# Patient Record
Sex: MALE | Race: WHITE | NOT HISPANIC OR LATINO | Employment: OTHER | URBAN - METROPOLITAN AREA
[De-identification: names, ages, dates, MRNs, and addresses within clinical notes are randomized per-mention and may not be internally consistent; named-entity substitution may affect disease eponyms.]

---

## 2019-02-22 ENCOUNTER — APPOINTMENT (OUTPATIENT)
Dept: GENERAL RADIOLOGY | Facility: HOSPITAL | Age: 84
End: 2019-02-22

## 2019-02-22 ENCOUNTER — HOSPITAL ENCOUNTER (INPATIENT)
Facility: HOSPITAL | Age: 84
LOS: 10 days | Discharge: NURSING, REHAB OR LONG-TERM CARE | End: 2019-03-04
Attending: EMERGENCY MEDICINE | Admitting: INTERNAL MEDICINE

## 2019-02-22 DIAGNOSIS — I50.23 ACUTE ON CHRONIC SYSTOLIC CONGESTIVE HEART FAILURE (HCC): ICD-10-CM

## 2019-02-22 DIAGNOSIS — Z74.09 IMPAIRED FUNCTIONAL MOBILITY, BALANCE, GAIT, AND ENDURANCE: ICD-10-CM

## 2019-02-22 DIAGNOSIS — N18.9 ACUTE RENAL FAILURE SUPERIMPOSED ON CHRONIC KIDNEY DISEASE, UNSPECIFIED CKD STAGE, UNSPECIFIED ACUTE RENAL FAILURE TYPE (HCC): ICD-10-CM

## 2019-02-22 DIAGNOSIS — Z78.9 IMPAIRED MOBILITY AND ADLS: ICD-10-CM

## 2019-02-22 DIAGNOSIS — S82.841A BIMALLEOLAR ANKLE FRACTURE, RIGHT, CLOSED, INITIAL ENCOUNTER: ICD-10-CM

## 2019-02-22 DIAGNOSIS — S93.431A SYNDESMOTIC DISRUPTION OF RIGHT ANKLE, INITIAL ENCOUNTER: ICD-10-CM
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
## 2019-02-22 DIAGNOSIS — I95.89 HYPOTENSION DUE TO HYPOVOLEMIA: ICD-10-CM

## 2019-02-22 DIAGNOSIS — N17.9 ACUTE RENAL FAILURE SUPERIMPOSED ON CHRONIC KIDNEY DISEASE, UNSPECIFIED CKD STAGE, UNSPECIFIED ACUTE RENAL FAILURE TYPE (HCC): ICD-10-CM

## 2019-02-22 DIAGNOSIS — S82.891A CLOSED FRACTURE OF RIGHT ANKLE, INITIAL ENCOUNTER: ICD-10-CM
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
## 2019-02-22 DIAGNOSIS — E86.9 VOLUME DEPLETION: ICD-10-CM

## 2019-02-22 DIAGNOSIS — I25.119 CORONARY ARTERY DISEASE INVOLVING NATIVE CORONARY ARTERY OF NATIVE HEART WITH ANGINA PECTORIS (HCC): ICD-10-CM

## 2019-02-22 DIAGNOSIS — Z74.09 IMPAIRED MOBILITY AND ADLS: ICD-10-CM

## 2019-02-22 DIAGNOSIS — K52.9 GASTROENTERITIS: ICD-10-CM

## 2019-02-22 DIAGNOSIS — E86.1 HYPOTENSION DUE TO HYPOVOLEMIA: ICD-10-CM

## 2019-02-22 PROBLEM — I50.22 CHRONIC SYSTOLIC CHF (CONGESTIVE HEART FAILURE) (HCC): Status: ACTIVE | Noted: 2019-02-22

## 2019-02-22 PROBLEM — E11.42 TYPE 2 DIABETES MELLITUS WITH DIABETIC POLYNEUROPATHY, WITH LONG-TERM CURRENT USE OF INSULIN (HCC): Status: ACTIVE | Noted: 2019-02-22

## 2019-02-22 PROBLEM — I25.5 ISCHEMIC CARDIOMYOPATHY: Status: ACTIVE | Noted: 2019-02-22

## 2019-02-22 PROBLEM — R11.14 BILIOUS VOMITING WITH NAUSEA: Status: ACTIVE | Noted: 2019-02-22

## 2019-02-22 PROBLEM — E66.01 SEVERE OBESITY (BMI 35.0-35.9 WITH COMORBIDITY) (HCC): Status: ACTIVE | Noted: 2019-02-22

## 2019-02-22 PROBLEM — I10 ESSENTIAL HYPERTENSION: Status: ACTIVE | Noted: 2019-02-22

## 2019-02-22 PROBLEM — S82.831A CLOSED FRACTURE OF DISTAL END OF RIGHT FIBULA: Status: ACTIVE | Noted: 2019-02-22

## 2019-02-22 PROBLEM — N18.30 CKD (CHRONIC KIDNEY DISEASE) STAGE 3, GFR 30-59 ML/MIN (HCC): Status: ACTIVE | Noted: 2019-02-22

## 2019-02-22 PROBLEM — R19.7 DIARRHEA OF PRESUMED INFECTIOUS ORIGIN: Status: ACTIVE | Noted: 2019-02-22

## 2019-02-22 PROBLEM — J44.9 COPD (CHRONIC OBSTRUCTIVE PULMONARY DISEASE) (HCC): Status: ACTIVE | Noted: 2019-02-22

## 2019-02-22 PROBLEM — E86.0 DEHYDRATION: Status: ACTIVE | Noted: 2019-02-22

## 2019-02-22 PROBLEM — Z79.4 TYPE 2 DIABETES MELLITUS WITH DIABETIC POLYNEUROPATHY, WITH LONG-TERM CURRENT USE OF INSULIN (HCC): Status: ACTIVE | Noted: 2019-02-22

## 2019-02-22 LAB
ALBUMIN SERPL-MCNC: 4.06 G/DL (ref 3.2–4.8)
ALBUMIN/GLOB SERPL: 1.8 G/DL (ref 1.5–2.5)
ALP SERPL-CCNC: 38 U/L (ref 25–100)
ALT SERPL W P-5'-P-CCNC: 13 U/L (ref 7–40)
ANION GAP SERPL CALCULATED.3IONS-SCNC: 7 MMOL/L (ref 3–11)
AST SERPL-CCNC: 15 U/L (ref 0–33)
BASOPHILS # BLD AUTO: 0.01 10*3/MM3 (ref 0–0.2)
BASOPHILS NFR BLD AUTO: 0.2 % (ref 0–1)
BILIRUB SERPL-MCNC: 1.2 MG/DL (ref 0.3–1.2)
BUN BLD-MCNC: 57 MG/DL (ref 9–23)
BUN/CREAT SERPL: 20.5 (ref 7–25)
CALCIUM SPEC-SCNC: 8.4 MG/DL (ref 8.7–10.4)
CHLORIDE SERPL-SCNC: 99 MMOL/L (ref 99–109)
CO2 SERPL-SCNC: 25 MMOL/L (ref 20–31)
CREAT BLD-MCNC: 2.78 MG/DL (ref 0.6–1.3)
D-LACTATE SERPL-SCNC: 1.5 MMOL/L (ref 0.5–2)
DEPRECATED RDW RBC AUTO: 55.7 FL (ref 37–54)
EOSINOPHIL # BLD AUTO: 0.02 10*3/MM3 (ref 0–0.3)
EOSINOPHIL NFR BLD AUTO: 0.3 % (ref 0–3)
ERYTHROCYTE [DISTWIDTH] IN BLOOD BY AUTOMATED COUNT: 15.5 % (ref 11.3–14.5)
GFR SERPL CREATININE-BSD FRML MDRD: 22 ML/MIN/1.73
GLOBULIN UR ELPH-MCNC: 2.2 GM/DL
GLUCOSE BLD-MCNC: 97 MG/DL (ref 70–100)
GLUCOSE BLDC GLUCOMTR-MCNC: 120 MG/DL (ref 70–130)
HCT VFR BLD AUTO: 35.3 % (ref 38.9–50.9)
HGB BLD-MCNC: 11.3 G/DL (ref 13.1–17.5)
HOLD SPECIMEN: NORMAL
HOLD SPECIMEN: NORMAL
IMM GRANULOCYTES # BLD AUTO: 0.04 10*3/MM3 (ref 0–0.05)
IMM GRANULOCYTES NFR BLD AUTO: 0.7 % (ref 0–0.6)
LYMPHOCYTES # BLD AUTO: 0.61 10*3/MM3 (ref 0.6–4.8)
LYMPHOCYTES NFR BLD AUTO: 10.1 % (ref 24–44)
MAGNESIUM SERPL-MCNC: 2 MG/DL (ref 1.3–2.7)
MCH RBC QN AUTO: 31.5 PG (ref 27–31)
MCHC RBC AUTO-ENTMCNC: 32 G/DL (ref 32–36)
MCV RBC AUTO: 98.3 FL (ref 80–99)
MONOCYTES # BLD AUTO: 0.48 10*3/MM3 (ref 0–1)
MONOCYTES NFR BLD AUTO: 7.9 % (ref 0–12)
NEUTROPHILS # BLD AUTO: 4.93 10*3/MM3 (ref 1.5–8.3)
NEUTROPHILS NFR BLD AUTO: 81.5 % (ref 41–71)
PLATELET # BLD AUTO: 178 10*3/MM3 (ref 150–450)
PMV BLD AUTO: 9.1 FL (ref 6–12)
POTASSIUM BLD-SCNC: 5.2 MMOL/L (ref 3.5–5.5)
PROT SERPL-MCNC: 6.3 G/DL (ref 5.7–8.2)
RBC # BLD AUTO: 3.59 10*6/MM3 (ref 4.2–5.76)
SODIUM BLD-SCNC: 131 MMOL/L (ref 132–146)
TROPONIN I SERPL-MCNC: 0.01 NG/ML (ref 0–0.07)
WBC NRBC COR # BLD: 6.05 10*3/MM3 (ref 3.5–10.8)
WHOLE BLOOD HOLD SPECIMEN: NORMAL
WHOLE BLOOD HOLD SPECIMEN: NORMAL

## 2019-02-22 PROCEDURE — 93005 ELECTROCARDIOGRAM TRACING: CPT | Performed by: EMERGENCY MEDICINE

## 2019-02-22 PROCEDURE — 71045 X-RAY EXAM CHEST 1 VIEW: CPT

## 2019-02-22 PROCEDURE — A9270 NON-COVERED ITEM OR SERVICE: HCPCS | Performed by: INTERNAL MEDICINE

## 2019-02-22 PROCEDURE — 63710000001 ATORVASTATIN 10 MG TABLET: Performed by: INTERNAL MEDICINE

## 2019-02-22 PROCEDURE — 73610 X-RAY EXAM OF ANKLE: CPT

## 2019-02-22 PROCEDURE — 83735 ASSAY OF MAGNESIUM: CPT | Performed by: EMERGENCY MEDICINE

## 2019-02-22 PROCEDURE — 94640 AIRWAY INHALATION TREATMENT: CPT

## 2019-02-22 PROCEDURE — 63710000001 METOPROLOL SUCCINATE XL 50 MG TABLET SUSTAINED-RELEASE 24 HOUR: Performed by: INTERNAL MEDICINE

## 2019-02-22 PROCEDURE — 73560 X-RAY EXAM OF KNEE 1 OR 2: CPT

## 2019-02-22 PROCEDURE — 83605 ASSAY OF LACTIC ACID: CPT | Performed by: EMERGENCY MEDICINE

## 2019-02-22 PROCEDURE — 99223 1ST HOSP IP/OBS HIGH 75: CPT | Performed by: INTERNAL MEDICINE

## 2019-02-22 PROCEDURE — 80053 COMPREHEN METABOLIC PANEL: CPT | Performed by: EMERGENCY MEDICINE

## 2019-02-22 PROCEDURE — 93005 ELECTROCARDIOGRAM TRACING: CPT

## 2019-02-22 PROCEDURE — 84484 ASSAY OF TROPONIN QUANT: CPT

## 2019-02-22 PROCEDURE — 63710000001 INSULIN LISPRO (HUMAN) PER 5 UNITS: Performed by: INTERNAL MEDICINE

## 2019-02-22 PROCEDURE — 99285 EMERGENCY DEPT VISIT HI MDM: CPT

## 2019-02-22 PROCEDURE — 63710000001 ASPIRIN 81 MG TABLET DELAYED-RELEASE: Performed by: INTERNAL MEDICINE

## 2019-02-22 PROCEDURE — 63710000001 ALLOPURINOL 100 MG TABLET: Performed by: INTERNAL MEDICINE

## 2019-02-22 PROCEDURE — 82962 GLUCOSE BLOOD TEST: CPT

## 2019-02-22 PROCEDURE — 63710000001 VITAMIN B-12 1000 MCG TABLET: Performed by: INTERNAL MEDICINE

## 2019-02-22 PROCEDURE — 63710000001 CITALOPRAM 10 MG TABLET: Performed by: INTERNAL MEDICINE

## 2019-02-22 PROCEDURE — 94799 UNLISTED PULMONARY SVC/PX: CPT

## 2019-02-22 PROCEDURE — 99222 1ST HOSP IP/OBS MODERATE 55: CPT | Performed by: ORTHOPAEDIC SURGERY

## 2019-02-22 PROCEDURE — 85025 COMPLETE CBC W/AUTO DIFF WBC: CPT | Performed by: EMERGENCY MEDICINE

## 2019-02-22 RX ORDER — LANOLIN ALCOHOL/MO/W.PET/CERES
1000 CREAM (GRAM) TOPICAL DAILY
Status: DISCONTINUED | OUTPATIENT
Start: 2019-02-22 | End: 2019-03-04 | Stop reason: HOSPADM

## 2019-02-22 RX ORDER — ALBUTEROL SULFATE 2.5 MG/3ML
2.5 SOLUTION RESPIRATORY (INHALATION)
Status: DISCONTINUED | OUTPATIENT
Start: 2019-02-22 | End: 2019-02-25

## 2019-02-22 RX ORDER — ALLOPURINOL 100 MG/1
100 TABLET ORAL DAILY
COMMUNITY

## 2019-02-22 RX ORDER — ONDANSETRON 2 MG/ML
4 INJECTION INTRAMUSCULAR; INTRAVENOUS EVERY 6 HOURS PRN
Status: DISCONTINUED | OUTPATIENT
Start: 2019-02-22 | End: 2019-03-04 | Stop reason: HOSPADM

## 2019-02-22 RX ORDER — SIMVASTATIN 20 MG
20 TABLET ORAL NIGHTLY
COMMUNITY
End: 2019-12-03 | Stop reason: SDUPTHER

## 2019-02-22 RX ORDER — NALOXONE HCL 0.4 MG/ML
0.4 VIAL (ML) INJECTION
Status: DISCONTINUED | OUTPATIENT
Start: 2019-02-22 | End: 2019-02-26

## 2019-02-22 RX ORDER — SODIUM CHLORIDE 0.9 % (FLUSH) 0.9 %
3 SYRINGE (ML) INJECTION EVERY 12 HOURS SCHEDULED
Status: DISCONTINUED | OUTPATIENT
Start: 2019-02-22 | End: 2019-03-04 | Stop reason: HOSPADM

## 2019-02-22 RX ORDER — HYDROCODONE BITARTRATE AND ACETAMINOPHEN 5; 325 MG/1; MG/1
1 TABLET ORAL EVERY 4 HOURS PRN
Status: DISCONTINUED | OUTPATIENT
Start: 2019-02-22 | End: 2019-02-27

## 2019-02-22 RX ORDER — ALBUTEROL SULFATE 2.5 MG/3ML
2.5 SOLUTION RESPIRATORY (INHALATION) EVERY 6 HOURS PRN
Status: DISCONTINUED | OUTPATIENT
Start: 2019-02-22 | End: 2019-03-04 | Stop reason: HOSPADM

## 2019-02-22 RX ORDER — ALBUTEROL SULFATE 90 UG/1
1-2 AEROSOL, METERED RESPIRATORY (INHALATION) EVERY 4 HOURS PRN
COMMUNITY

## 2019-02-22 RX ORDER — ATORVASTATIN CALCIUM 10 MG/1
10 TABLET, FILM COATED ORAL DAILY
Status: DISCONTINUED | OUTPATIENT
Start: 2019-02-22 | End: 2019-03-04 | Stop reason: HOSPADM

## 2019-02-22 RX ORDER — FAMOTIDINE 20 MG/1
20 TABLET, FILM COATED ORAL 2 TIMES DAILY PRN
Status: DISCONTINUED | OUTPATIENT
Start: 2019-02-22 | End: 2019-02-26

## 2019-02-22 RX ORDER — DOCUSATE SODIUM 100 MG/1
100 CAPSULE, LIQUID FILLED ORAL 2 TIMES DAILY PRN
Status: DISCONTINUED | OUTPATIENT
Start: 2019-02-22 | End: 2019-03-04 | Stop reason: HOSPADM

## 2019-02-22 RX ORDER — NICOTINE POLACRILEX 4 MG
15 LOZENGE BUCCAL
Status: DISCONTINUED | OUTPATIENT
Start: 2019-02-22 | End: 2019-02-26

## 2019-02-22 RX ORDER — SODIUM CHLORIDE 0.9 % (FLUSH) 0.9 %
3-10 SYRINGE (ML) INJECTION AS NEEDED
Status: DISCONTINUED | OUTPATIENT
Start: 2019-02-22 | End: 2019-02-26

## 2019-02-22 RX ORDER — ASPIRIN 81 MG/1
81 TABLET ORAL DAILY
Status: DISCONTINUED | OUTPATIENT
Start: 2019-02-22 | End: 2019-03-04 | Stop reason: HOSPADM

## 2019-02-22 RX ORDER — ONDANSETRON 4 MG/1
4 TABLET, FILM COATED ORAL EVERY 6 HOURS PRN
Status: DISCONTINUED | OUTPATIENT
Start: 2019-02-22 | End: 2019-02-26

## 2019-02-22 RX ORDER — ONDANSETRON 2 MG/ML
4 INJECTION INTRAMUSCULAR; INTRAVENOUS ONCE
Status: DISCONTINUED | OUTPATIENT
Start: 2019-02-22 | End: 2019-02-22

## 2019-02-22 RX ORDER — CITALOPRAM 10 MG/1
10 TABLET ORAL DAILY
COMMUNITY

## 2019-02-22 RX ORDER — MORPHINE SULFATE 4 MG/ML
1 INJECTION, SOLUTION INTRAMUSCULAR; INTRAVENOUS EVERY 4 HOURS PRN
Status: DISCONTINUED | OUTPATIENT
Start: 2019-02-22 | End: 2019-03-04 | Stop reason: HOSPADM

## 2019-02-22 RX ORDER — SODIUM CHLORIDE 9 MG/ML
100 INJECTION, SOLUTION INTRAVENOUS CONTINUOUS
Status: ACTIVE | OUTPATIENT
Start: 2019-02-22 | End: 2019-02-23

## 2019-02-22 RX ORDER — LANOLIN ALCOHOL/MO/W.PET/CERES
1000 CREAM (GRAM) TOPICAL DAILY
COMMUNITY

## 2019-02-22 RX ORDER — GLIPIZIDE 2.5 MG/1
2.5 TABLET, EXTENDED RELEASE ORAL NIGHTLY
COMMUNITY
End: 2019-03-04 | Stop reason: HOSPADM

## 2019-02-22 RX ORDER — DEXTROSE MONOHYDRATE 25 G/50ML
25 INJECTION, SOLUTION INTRAVENOUS
Status: DISCONTINUED | OUTPATIENT
Start: 2019-02-22 | End: 2019-02-26

## 2019-02-22 RX ORDER — FUROSEMIDE 40 MG/1
40 TABLET ORAL 2 TIMES DAILY
COMMUNITY
End: 2019-03-04 | Stop reason: HOSPADM

## 2019-02-22 RX ORDER — METOPROLOL SUCCINATE 50 MG/1
50 TABLET, EXTENDED RELEASE ORAL DAILY
Status: DISCONTINUED | OUTPATIENT
Start: 2019-02-22 | End: 2019-02-27

## 2019-02-22 RX ORDER — ALBUTEROL SULFATE 2.5 MG/3ML
2.5 SOLUTION RESPIRATORY (INHALATION) EVERY 8 HOURS PRN
COMMUNITY

## 2019-02-22 RX ORDER — SODIUM CHLORIDE 0.9 % (FLUSH) 0.9 %
10 SYRINGE (ML) INJECTION AS NEEDED
Status: DISCONTINUED | OUTPATIENT
Start: 2019-02-22 | End: 2019-03-04 | Stop reason: HOSPADM

## 2019-02-22 RX ORDER — CITALOPRAM 20 MG/1
10 TABLET ORAL DAILY
Status: DISCONTINUED | OUTPATIENT
Start: 2019-02-22 | End: 2019-03-04 | Stop reason: HOSPADM

## 2019-02-22 RX ORDER — ACETAMINOPHEN 325 MG/1
650 TABLET ORAL EVERY 4 HOURS PRN
Status: DISCONTINUED | OUTPATIENT
Start: 2019-02-22 | End: 2019-03-04 | Stop reason: HOSPADM

## 2019-02-22 RX ORDER — HYDROCODONE BITARTRATE AND ACETAMINOPHEN 5; 325 MG/1; MG/1
1 TABLET ORAL ONCE
Status: COMPLETED | OUTPATIENT
Start: 2019-02-22 | End: 2019-02-22

## 2019-02-22 RX ORDER — ALLOPURINOL 100 MG/1
100 TABLET ORAL DAILY
Status: DISCONTINUED | OUTPATIENT
Start: 2019-02-22 | End: 2019-03-04 | Stop reason: HOSPADM

## 2019-02-22 RX ORDER — HYDROCODONE BITARTRATE AND ACETAMINOPHEN 5; 325 MG/1; MG/1
1 TABLET ORAL ONCE
Status: DISCONTINUED | OUTPATIENT
Start: 2019-02-22 | End: 2019-02-22 | Stop reason: SDUPTHER

## 2019-02-22 RX ADMIN — METOPROLOL SUCCINATE 50 MG: 50 TABLET, EXTENDED RELEASE ORAL at 21:30

## 2019-02-22 RX ADMIN — SODIUM CHLORIDE 1000 ML: 9 INJECTION, SOLUTION INTRAVENOUS at 17:13

## 2019-02-22 RX ADMIN — ASPIRIN 81 MG: 81 TABLET, COATED ORAL at 21:30

## 2019-02-22 RX ADMIN — HYDROCODONE BITARTRATE AND ACETAMINOPHEN 1 TABLET: 5; 325 TABLET ORAL at 17:09

## 2019-02-22 RX ADMIN — CITALOPRAM HYDROBROMIDE 10 MG: 20 TABLET ORAL at 21:30

## 2019-02-22 RX ADMIN — ALLOPURINOL 100 MG: 100 TABLET ORAL at 21:30

## 2019-02-22 RX ADMIN — ATORVASTATIN CALCIUM 10 MG: 10 TABLET, FILM COATED ORAL at 21:30

## 2019-02-22 RX ADMIN — ALBUTEROL SULFATE 2.5 MG: 2.5 SOLUTION RESPIRATORY (INHALATION) at 17:46

## 2019-02-22 RX ADMIN — SODIUM CHLORIDE 1000 ML: 9 INJECTION, SOLUTION INTRAVENOUS at 13:42

## 2019-02-22 RX ADMIN — SODIUM CHLORIDE 100 ML/HR: 9 INJECTION, SOLUTION INTRAVENOUS at 21:30

## 2019-02-22 RX ADMIN — CYANOCOBALAMIN TAB 1000 MCG 1000 MCG: 1000 TAB at 21:30

## 2019-02-23 ENCOUNTER — ANESTHESIA (OUTPATIENT)
Dept: PERIOP | Facility: HOSPITAL | Age: 84
End: 2019-02-23

## 2019-02-23 ENCOUNTER — APPOINTMENT (OUTPATIENT)
Dept: GENERAL RADIOLOGY | Facility: HOSPITAL | Age: 84
End: 2019-02-23

## 2019-02-23 ENCOUNTER — ANESTHESIA EVENT (OUTPATIENT)
Dept: PERIOP | Facility: HOSPITAL | Age: 84
End: 2019-02-23

## 2019-02-23 LAB
ANION GAP SERPL CALCULATED.3IONS-SCNC: 7 MMOL/L (ref 3–11)
BASOPHILS # BLD MANUAL: 0 10*3/MM3 (ref 0–0.2)
BASOPHILS NFR BLD AUTO: 0 % (ref 0–1)
BILIRUB UR QL STRIP: NEGATIVE
BUN BLD-MCNC: 58 MG/DL (ref 9–23)
BUN/CREAT SERPL: 23.6 (ref 7–25)
CALCIUM SPEC-SCNC: 7.4 MG/DL (ref 8.7–10.4)
CHLORIDE SERPL-SCNC: 105 MMOL/L (ref 99–109)
CLARITY UR: CLEAR
CO2 SERPL-SCNC: 20 MMOL/L (ref 20–31)
COLOR UR: YELLOW
CREAT BLD-MCNC: 2.46 MG/DL (ref 0.6–1.3)
DEPRECATED RDW RBC AUTO: 61 FL (ref 37–54)
EOSINOPHIL # BLD MANUAL: 0.16 10*3/MM3 (ref 0.1–0.3)
EOSINOPHIL NFR BLD MANUAL: 3 % (ref 0–3)
ERYTHROCYTE [DISTWIDTH] IN BLOOD BY AUTOMATED COUNT: 16.3 % (ref 11.3–14.5)
GFR SERPL CREATININE-BSD FRML MDRD: 25 ML/MIN/1.73
GLUCOSE BLD-MCNC: 119 MG/DL (ref 70–100)
GLUCOSE BLDC GLUCOMTR-MCNC: 112 MG/DL (ref 70–130)
GLUCOSE BLDC GLUCOMTR-MCNC: 117 MG/DL (ref 70–130)
GLUCOSE BLDC GLUCOMTR-MCNC: 119 MG/DL (ref 70–130)
GLUCOSE BLDC GLUCOMTR-MCNC: 240 MG/DL (ref 70–130)
GLUCOSE UR STRIP-MCNC: NEGATIVE MG/DL
HBA1C MFR BLD: 7.2 % (ref 4.8–5.6)
HCT VFR BLD AUTO: 31.9 % (ref 38.9–50.9)
HGB BLD-MCNC: 9.9 G/DL (ref 13.1–17.5)
HGB UR QL STRIP.AUTO: NEGATIVE
INR PPP: 1.22 (ref 0.85–1.16)
KETONES UR QL STRIP: ABNORMAL
LEUKOCYTE ESTERASE UR QL STRIP.AUTO: NEGATIVE
LYMPHOCYTES # BLD MANUAL: 1.47 10*3/MM3 (ref 0.6–4.8)
LYMPHOCYTES NFR BLD MANUAL: 28 % (ref 24–44)
LYMPHOCYTES NFR BLD MANUAL: 9 % (ref 0–12)
MCH RBC QN AUTO: 31.9 PG (ref 27–31)
MCHC RBC AUTO-ENTMCNC: 31 G/DL (ref 32–36)
MCV RBC AUTO: 102.9 FL (ref 80–99)
MONOCYTES # BLD AUTO: 0.47 10*3/MM3 (ref 0–1)
NEUTROPHILS # BLD AUTO: 3.14 10*3/MM3 (ref 1.5–8.3)
NEUTROPHILS NFR BLD MANUAL: 60 % (ref 41–71)
NITRITE UR QL STRIP: NEGATIVE
PH UR STRIP.AUTO: <=5 [PH] (ref 5–8)
PLAT MORPH BLD: NORMAL
PLATELET # BLD AUTO: 157 10*3/MM3 (ref 150–450)
PMV BLD AUTO: 8.9 FL (ref 6–12)
POTASSIUM BLD-SCNC: 4.9 MMOL/L (ref 3.5–5.5)
PROT UR QL STRIP: NEGATIVE
PROTHROMBIN TIME: 14.8 SECONDS (ref 11.2–14.3)
RBC # BLD AUTO: 3.1 10*6/MM3 (ref 4.2–5.76)
RBC MORPH BLD: NORMAL
SODIUM BLD-SCNC: 132 MMOL/L (ref 132–146)
SP GR UR STRIP: 1.02 (ref 1–1.03)
UROBILINOGEN UR QL STRIP: ABNORMAL
WBC MORPH BLD: NORMAL
WBC NRBC COR # BLD: 5.24 10*3/MM3 (ref 3.5–10.8)

## 2019-02-23 PROCEDURE — 63710000001 ATORVASTATIN 10 MG TABLET: Performed by: INTERNAL MEDICINE

## 2019-02-23 PROCEDURE — C1713 ANCHOR/SCREW BN/BN,TIS/BN: HCPCS | Performed by: ORTHOPAEDIC SURGERY

## 2019-02-23 PROCEDURE — 25010000002 ROPIVACAINE PER 1 MG: Performed by: ANESTHESIOLOGY

## 2019-02-23 PROCEDURE — 27829 TREAT LOWER LEG JOINT: CPT | Performed by: ORTHOPAEDIC SURGERY

## 2019-02-23 PROCEDURE — 63710000001 INSULIN LISPRO (HUMAN) PER 5 UNITS: Performed by: INTERNAL MEDICINE

## 2019-02-23 PROCEDURE — 63710000001 INSULIN DETEMIR PER 5 UNITS: Performed by: INTERNAL MEDICINE

## 2019-02-23 PROCEDURE — A9270 NON-COVERED ITEM OR SERVICE: HCPCS | Performed by: INTERNAL MEDICINE

## 2019-02-23 PROCEDURE — 63710000001 ALLOPURINOL 100 MG TABLET: Performed by: INTERNAL MEDICINE

## 2019-02-23 PROCEDURE — 25010000002 PROPOFOL 10 MG/ML EMULSION: Performed by: ANESTHESIOLOGY

## 2019-02-23 PROCEDURE — 85007 BL SMEAR W/DIFF WBC COUNT: CPT | Performed by: INTERNAL MEDICINE

## 2019-02-23 PROCEDURE — 76000 FLUOROSCOPY <1 HR PHYS/QHP: CPT

## 2019-02-23 PROCEDURE — 63710000001 FAMOTIDINE 20 MG TABLET: Performed by: ANESTHESIOLOGY

## 2019-02-23 PROCEDURE — 81003 URINALYSIS AUTO W/O SCOPE: CPT | Performed by: INTERNAL MEDICINE

## 2019-02-23 PROCEDURE — 63710000001 HYDROCODONE-ACETAMINOPHEN 5-325 MG TABLET: Performed by: INTERNAL MEDICINE

## 2019-02-23 PROCEDURE — 63710000001 METOPROLOL SUCCINATE XL 50 MG TABLET SUSTAINED-RELEASE 24 HOUR: Performed by: INTERNAL MEDICINE

## 2019-02-23 PROCEDURE — 27814 TREATMENT OF ANKLE FRACTURE: CPT | Performed by: ORTHOPAEDIC SURGERY

## 2019-02-23 PROCEDURE — 0QSJ04Z REPOSITION RIGHT FIBULA WITH INTERNAL FIXATION DEVICE, OPEN APPROACH: ICD-10-PCS | Performed by: ORTHOPAEDIC SURGERY

## 2019-02-23 PROCEDURE — 99233 SBSQ HOSP IP/OBS HIGH 50: CPT | Performed by: INTERNAL MEDICINE

## 2019-02-23 PROCEDURE — 94799 UNLISTED PULMONARY SVC/PX: CPT

## 2019-02-23 PROCEDURE — 82962 GLUCOSE BLOOD TEST: CPT

## 2019-02-23 PROCEDURE — 71046 X-RAY EXAM CHEST 2 VIEWS: CPT

## 2019-02-23 PROCEDURE — 80048 BASIC METABOLIC PNL TOTAL CA: CPT | Performed by: INTERNAL MEDICINE

## 2019-02-23 PROCEDURE — 85610 PROTHROMBIN TIME: CPT | Performed by: INTERNAL MEDICINE

## 2019-02-23 PROCEDURE — 25010000002 FENTANYL CITRATE (PF) 100 MCG/2ML SOLUTION: Performed by: ANESTHESIOLOGY

## 2019-02-23 PROCEDURE — 85027 COMPLETE CBC AUTOMATED: CPT | Performed by: INTERNAL MEDICINE

## 2019-02-23 PROCEDURE — A9270 NON-COVERED ITEM OR SERVICE: HCPCS | Performed by: ANESTHESIOLOGY

## 2019-02-23 PROCEDURE — 83036 HEMOGLOBIN GLYCOSYLATED A1C: CPT | Performed by: INTERNAL MEDICINE

## 2019-02-23 DEVICE — PLT FIB PERIART LK D/L 4H 80 RT: Type: IMPLANTABLE DEVICE | Site: ANKLE | Status: FUNCTIONAL

## 2019-02-23 DEVICE — SCRW ULS LK 2.7X20MM: Type: IMPLANTABLE DEVICE | Site: ANKLE | Status: FUNCTIONAL

## 2019-02-23 DEVICE — SCRW ULS LK 2.7X16MM: Type: IMPLANTABLE DEVICE | Site: ANKLE | Status: FUNCTIONAL

## 2019-02-23 DEVICE — IMPLANTABLE DEVICE: Type: IMPLANTABLE DEVICE | Site: ANKLE | Status: FUNCTIONAL

## 2019-02-23 DEVICE — SCRW PERIART S/TAP HD/2.7MM 3.5X16MM: Type: IMPLANTABLE DEVICE | Site: ANKLE | Status: FUNCTIONAL

## 2019-02-23 DEVICE — SCRW ULS LK PT S/TAP 2.7X14MM: Type: IMPLANTABLE DEVICE | Site: ANKLE | Status: FUNCTIONAL

## 2019-02-23 RX ORDER — SODIUM CHLORIDE 0.9 % (FLUSH) 0.9 %
3-10 SYRINGE (ML) INJECTION AS NEEDED
Status: DISCONTINUED | OUTPATIENT
Start: 2019-02-23 | End: 2019-03-04 | Stop reason: HOSPADM

## 2019-02-23 RX ORDER — PROMETHAZINE HYDROCHLORIDE 25 MG/1
25 SUPPOSITORY RECTAL ONCE AS NEEDED
Status: DISCONTINUED | OUTPATIENT
Start: 2019-02-23 | End: 2019-02-23 | Stop reason: HOSPADM

## 2019-02-23 RX ORDER — SODIUM CHLORIDE AND POTASSIUM CHLORIDE 150; 900 MG/100ML; MG/100ML
50 INJECTION, SOLUTION INTRAVENOUS CONTINUOUS
Status: DISCONTINUED | OUTPATIENT
Start: 2019-02-23 | End: 2019-02-24

## 2019-02-23 RX ORDER — SODIUM CHLORIDE 0.9 % (FLUSH) 0.9 %
3 SYRINGE (ML) INJECTION EVERY 12 HOURS SCHEDULED
Status: CANCELLED | OUTPATIENT
Start: 2019-02-23

## 2019-02-23 RX ORDER — ROPIVACAINE HYDROCHLORIDE 2 MG/ML
8 INJECTION, SOLUTION EPIDURAL; INFILTRATION CONTINUOUS
Status: DISCONTINUED | OUTPATIENT
Start: 2019-02-23 | End: 2019-02-27

## 2019-02-23 RX ORDER — SODIUM CHLORIDE, SODIUM LACTATE, POTASSIUM CHLORIDE, CALCIUM CHLORIDE 600; 310; 30; 20 MG/100ML; MG/100ML; MG/100ML; MG/100ML
INJECTION, SOLUTION INTRAVENOUS CONTINUOUS PRN
Status: DISCONTINUED | OUTPATIENT
Start: 2019-02-23 | End: 2019-02-23 | Stop reason: SURG

## 2019-02-23 RX ORDER — PROMETHAZINE HYDROCHLORIDE 25 MG/ML
6.25 INJECTION, SOLUTION INTRAMUSCULAR; INTRAVENOUS ONCE AS NEEDED
Status: DISCONTINUED | OUTPATIENT
Start: 2019-02-23 | End: 2019-02-23 | Stop reason: HOSPADM

## 2019-02-23 RX ORDER — SODIUM CHLORIDE 9 MG/ML
75 INJECTION, SOLUTION INTRAVENOUS CONTINUOUS
Status: DISCONTINUED | OUTPATIENT
Start: 2019-02-23 | End: 2019-02-23

## 2019-02-23 RX ORDER — PROPOFOL 10 MG/ML
VIAL (ML) INTRAVENOUS AS NEEDED
Status: DISCONTINUED | OUTPATIENT
Start: 2019-02-23 | End: 2019-02-23 | Stop reason: SURG

## 2019-02-23 RX ORDER — CEFAZOLIN SODIUM IN 0.9 % NACL 3 G/100 ML
3 INTRAVENOUS SOLUTION, PIGGYBACK (ML) INTRAVENOUS ONCE
Status: COMPLETED | OUTPATIENT
Start: 2019-02-23 | End: 2019-02-23

## 2019-02-23 RX ORDER — SODIUM CHLORIDE 0.9 % (FLUSH) 0.9 %
3-10 SYRINGE (ML) INJECTION AS NEEDED
Status: CANCELLED | OUTPATIENT
Start: 2019-02-23

## 2019-02-23 RX ORDER — MAGNESIUM HYDROXIDE 1200 MG/15ML
LIQUID ORAL AS NEEDED
Status: DISCONTINUED | OUTPATIENT
Start: 2019-02-23 | End: 2019-02-23 | Stop reason: HOSPADM

## 2019-02-23 RX ORDER — HYDROMORPHONE HYDROCHLORIDE 1 MG/ML
0.5 INJECTION, SOLUTION INTRAMUSCULAR; INTRAVENOUS; SUBCUTANEOUS
Status: DISCONTINUED | OUTPATIENT
Start: 2019-02-23 | End: 2019-02-23 | Stop reason: HOSPADM

## 2019-02-23 RX ORDER — FENTANYL CITRATE 50 UG/ML
50 INJECTION, SOLUTION INTRAMUSCULAR; INTRAVENOUS
Status: DISCONTINUED | OUTPATIENT
Start: 2019-02-23 | End: 2019-02-23 | Stop reason: HOSPADM

## 2019-02-23 RX ORDER — FAMOTIDINE 10 MG/ML
20 INJECTION, SOLUTION INTRAVENOUS ONCE
Status: CANCELLED | OUTPATIENT
Start: 2019-02-23 | End: 2019-02-23

## 2019-02-23 RX ORDER — ONDANSETRON 4 MG/1
4 TABLET, FILM COATED ORAL EVERY 6 HOURS PRN
Status: DISCONTINUED | OUTPATIENT
Start: 2019-02-23 | End: 2019-02-26

## 2019-02-23 RX ORDER — FENTANYL CITRATE 50 UG/ML
INJECTION, SOLUTION INTRAMUSCULAR; INTRAVENOUS
Status: COMPLETED | OUTPATIENT
Start: 2019-02-23 | End: 2019-02-23

## 2019-02-23 RX ORDER — FAMOTIDINE 20 MG/1
20 TABLET, FILM COATED ORAL ONCE
Status: COMPLETED | OUTPATIENT
Start: 2019-02-23 | End: 2019-02-23

## 2019-02-23 RX ORDER — SODIUM CHLORIDE, SODIUM LACTATE, POTASSIUM CHLORIDE, CALCIUM CHLORIDE 600; 310; 30; 20 MG/100ML; MG/100ML; MG/100ML; MG/100ML
9 INJECTION, SOLUTION INTRAVENOUS CONTINUOUS
Status: CANCELLED | OUTPATIENT
Start: 2019-02-23

## 2019-02-23 RX ORDER — LIDOCAINE HYDROCHLORIDE 10 MG/ML
0.5 INJECTION, SOLUTION EPIDURAL; INFILTRATION; INTRACAUDAL; PERINEURAL ONCE AS NEEDED
Status: CANCELLED | OUTPATIENT
Start: 2019-02-23

## 2019-02-23 RX ORDER — DOCUSATE SODIUM 100 MG/1
100 CAPSULE, LIQUID FILLED ORAL 2 TIMES DAILY PRN
Status: DISCONTINUED | OUTPATIENT
Start: 2019-02-23 | End: 2019-02-26

## 2019-02-23 RX ORDER — ONDANSETRON 2 MG/ML
4 INJECTION INTRAMUSCULAR; INTRAVENOUS EVERY 6 HOURS PRN
Status: DISCONTINUED | OUTPATIENT
Start: 2019-02-23 | End: 2019-02-26

## 2019-02-23 RX ORDER — SODIUM CHLORIDE 0.9 % (FLUSH) 0.9 %
3 SYRINGE (ML) INJECTION EVERY 12 HOURS SCHEDULED
Status: DISCONTINUED | OUTPATIENT
Start: 2019-02-23 | End: 2019-03-04 | Stop reason: HOSPADM

## 2019-02-23 RX ORDER — ONDANSETRON 2 MG/ML
4 INJECTION INTRAMUSCULAR; INTRAVENOUS ONCE AS NEEDED
Status: DISCONTINUED | OUTPATIENT
Start: 2019-02-23 | End: 2019-02-23 | Stop reason: HOSPADM

## 2019-02-23 RX ORDER — PROMETHAZINE HYDROCHLORIDE 25 MG/1
25 TABLET ORAL ONCE AS NEEDED
Status: DISCONTINUED | OUTPATIENT
Start: 2019-02-23 | End: 2019-02-23 | Stop reason: HOSPADM

## 2019-02-23 RX ORDER — BUPIVACAINE HYDROCHLORIDE 2.5 MG/ML
INJECTION, SOLUTION EPIDURAL; INFILTRATION; INTRACAUDAL
Status: COMPLETED | OUTPATIENT
Start: 2019-02-23 | End: 2019-02-23

## 2019-02-23 RX ORDER — CEFAZOLIN SODIUM IN 0.9 % NACL 3 G/100 ML
3 INTRAVENOUS SOLUTION, PIGGYBACK (ML) INTRAVENOUS EVERY 8 HOURS
Status: COMPLETED | OUTPATIENT
Start: 2019-02-23 | End: 2019-02-24

## 2019-02-23 RX ORDER — BISACODYL 10 MG
10 SUPPOSITORY, RECTAL RECTAL DAILY PRN
Status: DISCONTINUED | OUTPATIENT
Start: 2019-02-23 | End: 2019-02-26

## 2019-02-23 RX ORDER — PROPOFOL 10 MG/ML
VIAL (ML) INTRAVENOUS CONTINUOUS PRN
Status: DISCONTINUED | OUTPATIENT
Start: 2019-02-23 | End: 2019-02-23 | Stop reason: SURG

## 2019-02-23 RX ADMIN — SODIUM CHLORIDE, PRESERVATIVE FREE 3 ML: 5 INJECTION INTRAVENOUS at 23:29

## 2019-02-23 RX ADMIN — INSULIN LISPRO 4 UNITS: 100 INJECTION, SOLUTION INTRAVENOUS; SUBCUTANEOUS at 20:38

## 2019-02-23 RX ADMIN — SODIUM CHLORIDE 125 ML/HR: 9 INJECTION, SOLUTION INTRAVENOUS at 09:49

## 2019-02-23 RX ADMIN — ATORVASTATIN CALCIUM 10 MG: 10 TABLET, FILM COATED ORAL at 20:33

## 2019-02-23 RX ADMIN — INSULIN DETEMIR 10 UNITS: 100 INJECTION, SOLUTION SUBCUTANEOUS at 20:38

## 2019-02-23 RX ADMIN — FENTANYL CITRATE 100 MCG: 50 INJECTION, SOLUTION INTRAMUSCULAR; INTRAVENOUS at 14:35

## 2019-02-23 RX ADMIN — SODIUM CHLORIDE, POTASSIUM CHLORIDE, SODIUM LACTATE AND CALCIUM CHLORIDE: 600; 310; 30; 20 INJECTION, SOLUTION INTRAVENOUS at 14:01

## 2019-02-23 RX ADMIN — BUPIVACAINE HYDROCHLORIDE 30 ML: 2.5 INJECTION, SOLUTION EPIDURAL; INFILTRATION; INTRACAUDAL; PERINEURAL at 14:35

## 2019-02-23 RX ADMIN — ALLOPURINOL 100 MG: 100 TABLET ORAL at 17:30

## 2019-02-23 RX ADMIN — PROPOFOL 75 MCG/KG/MIN: 10 INJECTION, EMULSION INTRAVENOUS at 14:19

## 2019-02-23 RX ADMIN — HYDROCODONE BITARTRATE AND ACETAMINOPHEN 1 TABLET: 5; 325 TABLET ORAL at 20:33

## 2019-02-23 RX ADMIN — HYDROCODONE BITARTRATE AND ACETAMINOPHEN 1 TABLET: 5; 325 TABLET ORAL at 06:37

## 2019-02-23 RX ADMIN — ROPIVACAINE HYDROCHLORIDE 8 ML/HR: 2 INJECTION, SOLUTION EPIDURAL; INFILTRATION at 15:50

## 2019-02-23 RX ADMIN — FAMOTIDINE 20 MG: 20 TABLET, FILM COATED ORAL at 13:32

## 2019-02-23 RX ADMIN — ALBUTEROL SULFATE 2.5 MG: 2.5 SOLUTION RESPIRATORY (INHALATION) at 09:35

## 2019-02-23 RX ADMIN — ALBUTEROL SULFATE 2.5 MG: 2.5 SOLUTION RESPIRATORY (INHALATION) at 23:12

## 2019-02-23 RX ADMIN — PROPOFOL 100 MG: 10 INJECTION, EMULSION INTRAVENOUS at 14:28

## 2019-02-23 RX ADMIN — ALBUTEROL SULFATE 2.5 MG: 2.5 SOLUTION RESPIRATORY (INHALATION) at 17:12

## 2019-02-23 RX ADMIN — Medication 3 G: at 14:19

## 2019-02-23 NOTE — ANESTHESIA POSTPROCEDURE EVALUATION
Patient: Lico Diego    Procedure Summary     Date:  02/23/19 Room / Location:   [facility]  /  [facility]    Anesthesia Start:  1401 Anesthesia Stop:  1542    Procedure:  OPEN REDUCTION INTERNAL FIXATION RIGHT LATERAL MALLEOLUS FRACTURE AND SYNDESMOSIS (Right Ankle) Diagnosis:      Surgeon:  Ken Abreu MD Provider:  Gerard Aly MD    Anesthesia Type:  regional, MAC ASA Status:  3          Anesthesia Type: regional, MAC  Last vitals  BP   108/56 (02/23/19 1329)   Temp   98.7 °F (37.1 °C) (02/23/19 1329)   Pulse   68 (02/23/19 1329)   Resp   20 (02/23/19 1329)     SpO2   93 % (02/23/19 1329)     Post Anesthesia Care and Evaluation    Patient location during evaluation: PACU  Patient participation: complete - patient participated  Level of consciousness: awake and alert  Pain score: 0  Pain management: adequate  Airway patency: patent  Anesthetic complications: No anesthetic complications  PONV Status: none  Cardiovascular status: hemodynamically stable and acceptable  Respiratory status: nonlabored ventilation, acceptable and nasal cannula  Hydration status: acceptable

## 2019-02-23 NOTE — ANESTHESIA PREPROCEDURE EVALUATION
Anesthesia Evaluation     Patient summary reviewed and Nursing notes reviewed                Airway   Mallampati: II  TM distance: >3 FB  Neck ROM: full  No difficulty expected  Dental - normal exam   (+) edentulous    Pulmonary - normal exam   (+) COPD moderate, sleep apnea on CPAP,     ROS comment: Home O2 prn  Cardiovascular - normal exam  Exercise tolerance: poor (<4 METS)    ECG reviewed    (+) hypertension, CAD, CABG >6 Months, CHF (per chart EF 35%),     ROS comment: ST changes in anterlateral leads; no prior for comparison; negative troponin yesterday    Neuro/Psych- negative ROS  GI/Hepatic/Renal/Endo    (+) morbid obesity,  renal disease (Cr 2.46) CRI, diabetes mellitus,     Musculoskeletal (-) negative ROS    Abdominal  - normal exam    Bowel sounds: normal.   Substance History - negative use     OB/GYN negative ob/gyn ROS         Other          Other Comment: Anemia HCT 31                Anesthesia Plan    ASA 3     regional and MAC   (Peripheral nerve block + cath for post op pain relief)  intravenous induction   Anesthetic plan, all risks, benefits, and alternatives have been provided, discussed and informed consent has been obtained with: patient.    Plan discussed with CRNA.

## 2019-02-23 NOTE — ANESTHESIA PROCEDURE NOTES
Airway  Urgency: elective    Airway not difficult    General Information and Staff    Patient location during procedure: OR  Anesthesiologist: Gerard Aly MD    Indications and Patient Condition  Indications for airway management: airway protection    Preoxygenated: yes  Mask difficulty assessment: 1 - vent by mask    Final Airway Details  Final airway type: supraglottic airway      Successful airway: I-gel  Size 4    Number of attempts at approach: 1    Additional Comments  LMA placed without difficulty, ventilation with assist, equal breath sounds and symmetric chest rise and fall

## 2019-02-23 NOTE — ANESTHESIA PROCEDURE NOTES
Peripheral Block      Patient location during procedure: pre-op  Start time: 2/23/2019 1:33 PM  Stop time: 2/23/2019 1:49 PM  Reason for block: at surgeon's request and post-op pain management  Performed by  Anesthesiologist: Gerard Aly MD  Assisted by: Virginia Garcia RN  Preanesthetic Checklist  Completed: patient identified, site marked, surgical consent, pre-op evaluation, timeout performed, IV checked, risks and benefits discussed and monitors and equipment checked  Prep:  Pt Position: supine  Sterile barriers:cap, gloves, mask and sterile barriers  Prep: ChloraPrep  Patient monitoring: blood pressure monitoring, continuous pulse oximetry and EKG  Procedure  Sedation:yes    Guidance:ultrasound guided  Images:still images obtained    Laterality:right  Block Type:popliteal  Injection Technique:catheter  Needle Type:echogenic  Needle Gauge:18 G    Catheter Size:20 G  Cath Depth at skin: 12 cm  Medications Used: fentaNYL citrate (PF) (SUBLIMAZE) injection, 100 mcg  bupivacaine PF (MARCAINE) 0.25 % injection, 30 mL  Post Assessment  Injection Assessment: negative aspiration for heme, no paresthesia on injection and incremental injection  Patient Tolerance:comfortable throughout block  Complications:no  Additional Notes  Procedure:                                                         The pt was placed in  lateral position.  The Insertion site was  prepped and Draped in sterile fashion.  The pt was anesthetized with  IV Sedation( see meds).  Skin and cutaneous tissue was infiltrated and anesthetized with 1% Lidocaine 3 mls via a 25g needle.  A BBraun 4 inch 18g echogenic needle was then  inserted approximately 3 cm proximal to the popliteal ximena a at the lateral mid biceps femoris and advanced In-plane with Ultrasound guidance.  Normal Saline PSF was utilized for hydrodissection of tissue.  The popliteal artery was visualized and the common peroneal and tibial bifurcation was located.  LA injection spread  was visualized, injection was incremental 1-5ml, injection pressure was normal or little, no intraneural injection, no vascular injection.  .  A BBraun 20g wire stylet catheter was placed via the needle with ultrasound visualization and confirmation with NS fluid bolus. The labeled Catheter was then secured at insertions site with skin afix,  mastisol, steristrips  and a CHG transparent dressing was placed over. Thank you

## 2019-02-24 ENCOUNTER — APPOINTMENT (OUTPATIENT)
Dept: ULTRASOUND IMAGING | Facility: HOSPITAL | Age: 84
End: 2019-02-24

## 2019-02-24 LAB
ALBUMIN SERPL-MCNC: 3.35 G/DL (ref 3.2–4.8)
ANION GAP SERPL CALCULATED.3IONS-SCNC: 7 MMOL/L (ref 3–11)
BASOPHILS # BLD AUTO: 0 10*3/MM3 (ref 0–0.2)
BASOPHILS NFR BLD AUTO: 0 % (ref 0–1)
BUN BLD-MCNC: 54 MG/DL (ref 9–23)
BUN/CREAT SERPL: 23.3 (ref 7–25)
CALCIUM SPEC-SCNC: 7.5 MG/DL (ref 8.7–10.4)
CHLORIDE SERPL-SCNC: 105 MMOL/L (ref 99–109)
CK SERPL-CCNC: 64 U/L (ref 26–174)
CO2 SERPL-SCNC: 19 MMOL/L (ref 20–31)
CREAT BLD-MCNC: 2.32 MG/DL (ref 0.6–1.3)
DEPRECATED RDW RBC AUTO: 55 FL (ref 37–54)
EOSINOPHIL # BLD AUTO: 0 10*3/MM3 (ref 0–0.3)
EOSINOPHIL NFR BLD AUTO: 0 % (ref 0–3)
ERYTHROCYTE [DISTWIDTH] IN BLOOD BY AUTOMATED COUNT: 15.6 % (ref 11.3–14.5)
FERRITIN SERPL-MCNC: 260 NG/ML (ref 22–322)
GFR SERPL CREATININE-BSD FRML MDRD: 27 ML/MIN/1.73
GLUCOSE BLD-MCNC: 258 MG/DL (ref 70–100)
GLUCOSE BLDC GLUCOMTR-MCNC: 178 MG/DL (ref 70–130)
GLUCOSE BLDC GLUCOMTR-MCNC: 183 MG/DL (ref 70–130)
GLUCOSE BLDC GLUCOMTR-MCNC: 211 MG/DL (ref 70–130)
GLUCOSE BLDC GLUCOMTR-MCNC: 252 MG/DL (ref 70–130)
HCT VFR BLD AUTO: 29.2 % (ref 38.9–50.9)
HGB BLD-MCNC: 9.7 G/DL (ref 13.1–17.5)
IMM GRANULOCYTES # BLD AUTO: 0.01 10*3/MM3 (ref 0–0.05)
IMM GRANULOCYTES NFR BLD AUTO: 0.2 % (ref 0–0.6)
IRON 24H UR-MRATE: 15 MCG/DL (ref 50–175)
IRON SATN MFR SERPL: 6 % (ref 20–50)
LYMPHOCYTES # BLD AUTO: 0.66 10*3/MM3 (ref 0.6–4.8)
LYMPHOCYTES NFR BLD AUTO: 10.6 % (ref 24–44)
MCH RBC QN AUTO: 32.1 PG (ref 27–31)
MCHC RBC AUTO-ENTMCNC: 33.2 G/DL (ref 32–36)
MCV RBC AUTO: 96.7 FL (ref 80–99)
MONOCYTES # BLD AUTO: 0.71 10*3/MM3 (ref 0–1)
MONOCYTES NFR BLD AUTO: 11.4 % (ref 0–12)
NEUTROPHILS # BLD AUTO: 4.86 10*3/MM3 (ref 1.5–8.3)
NEUTROPHILS NFR BLD AUTO: 77.8 % (ref 41–71)
PHOSPHATE SERPL-MCNC: 4.2 MG/DL (ref 2.4–5.1)
PLATELET # BLD AUTO: 151 10*3/MM3 (ref 150–450)
PMV BLD AUTO: 8.6 FL (ref 6–12)
POTASSIUM BLD-SCNC: 5 MMOL/L (ref 3.5–5.5)
RBC # BLD AUTO: 3.02 10*6/MM3 (ref 4.2–5.76)
SODIUM BLD-SCNC: 131 MMOL/L (ref 132–146)
TIBC SERPL-MCNC: 234 MCG/DL (ref 250–450)
WBC NRBC COR # BLD: 6.24 10*3/MM3 (ref 3.5–10.8)

## 2019-02-24 PROCEDURE — 85025 COMPLETE CBC W/AUTO DIFF WBC: CPT | Performed by: INTERNAL MEDICINE

## 2019-02-24 PROCEDURE — A9270 NON-COVERED ITEM OR SERVICE: HCPCS | Performed by: INTERNAL MEDICINE

## 2019-02-24 PROCEDURE — 99233 SBSQ HOSP IP/OBS HIGH 50: CPT | Performed by: INTERNAL MEDICINE

## 2019-02-24 PROCEDURE — 63710000001 HYDROCODONE-ACETAMINOPHEN 5-325 MG TABLET: Performed by: INTERNAL MEDICINE

## 2019-02-24 PROCEDURE — 83540 ASSAY OF IRON: CPT | Performed by: INTERNAL MEDICINE

## 2019-02-24 PROCEDURE — 63710000001 CITALOPRAM 20 MG TABLET: Performed by: INTERNAL MEDICINE

## 2019-02-24 PROCEDURE — 63710000001 ASPIRIN 81 MG TABLET DELAYED-RELEASE: Performed by: INTERNAL MEDICINE

## 2019-02-24 PROCEDURE — 63710000001 METOPROLOL SUCCINATE XL 50 MG TABLET SUSTAINED-RELEASE 24 HOUR: Performed by: INTERNAL MEDICINE

## 2019-02-24 PROCEDURE — 82550 ASSAY OF CK (CPK): CPT | Performed by: INTERNAL MEDICINE

## 2019-02-24 PROCEDURE — 63710000001 ATORVASTATIN 10 MG TABLET: Performed by: INTERNAL MEDICINE

## 2019-02-24 PROCEDURE — 94799 UNLISTED PULMONARY SVC/PX: CPT

## 2019-02-24 PROCEDURE — 63710000001 INSULIN DETEMIR PER 5 UNITS: Performed by: INTERNAL MEDICINE

## 2019-02-24 PROCEDURE — 97166 OT EVAL MOD COMPLEX 45 MIN: CPT | Performed by: OCCUPATIONAL THERAPIST

## 2019-02-24 PROCEDURE — 82962 GLUCOSE BLOOD TEST: CPT

## 2019-02-24 PROCEDURE — 82728 ASSAY OF FERRITIN: CPT | Performed by: INTERNAL MEDICINE

## 2019-02-24 PROCEDURE — 25010000002 CEFAZOLIN PER 500 MG: Performed by: ORTHOPAEDIC SURGERY

## 2019-02-24 PROCEDURE — 63710000001 VITAMIN B-12 1000 MCG TABLET: Performed by: INTERNAL MEDICINE

## 2019-02-24 PROCEDURE — 76775 US EXAM ABDO BACK WALL LIM: CPT

## 2019-02-24 PROCEDURE — 25010000002 ROPIVACAINE PER 1 MG: Performed by: ANESTHESIOLOGY

## 2019-02-24 PROCEDURE — 63710000001 ALLOPURINOL 100 MG TABLET: Performed by: INTERNAL MEDICINE

## 2019-02-24 PROCEDURE — 99024 POSTOP FOLLOW-UP VISIT: CPT | Performed by: ORTHOPAEDIC SURGERY

## 2019-02-24 PROCEDURE — 97162 PT EVAL MOD COMPLEX 30 MIN: CPT

## 2019-02-24 PROCEDURE — 80069 RENAL FUNCTION PANEL: CPT | Performed by: INTERNAL MEDICINE

## 2019-02-24 PROCEDURE — 63710000001 FLUTICASONE 50 MCG/ACT SUSPENSION 16 G BOTTLE: Performed by: INTERNAL MEDICINE

## 2019-02-24 PROCEDURE — 83550 IRON BINDING TEST: CPT | Performed by: INTERNAL MEDICINE

## 2019-02-24 PROCEDURE — 25010000002 NA FERRIC GLUC CPLX PER 12.5 MG: Performed by: INTERNAL MEDICINE

## 2019-02-24 PROCEDURE — 63710000001 FAMOTIDINE 20 MG TABLET: Performed by: INTERNAL MEDICINE

## 2019-02-24 PROCEDURE — 25010000002 ENOXAPARIN PER 10 MG: Performed by: ORTHOPAEDIC SURGERY

## 2019-02-24 RX ORDER — FLUTICASONE PROPIONATE 50 MCG
2 SPRAY, SUSPENSION (ML) NASAL DAILY
Status: DISCONTINUED | OUTPATIENT
Start: 2019-02-24 | End: 2019-03-04 | Stop reason: HOSPADM

## 2019-02-24 RX ADMIN — ATORVASTATIN CALCIUM 10 MG: 10 TABLET, FILM COATED ORAL at 08:43

## 2019-02-24 RX ADMIN — FLUTICASONE PROPIONATE 2 SPRAY: 50 SPRAY, METERED NASAL at 13:29

## 2019-02-24 RX ADMIN — CYANOCOBALAMIN TAB 1000 MCG 1000 MCG: 1000 TAB at 08:44

## 2019-02-24 RX ADMIN — SODIUM CHLORIDE, PRESERVATIVE FREE 3 ML: 5 INJECTION INTRAVENOUS at 00:02

## 2019-02-24 RX ADMIN — INSULIN DETEMIR 10 UNITS: 100 INJECTION, SOLUTION SUBCUTANEOUS at 22:06

## 2019-02-24 RX ADMIN — FAMOTIDINE 20 MG: 20 TABLET, FILM COATED ORAL at 08:45

## 2019-02-24 RX ADMIN — INSULIN LISPRO 2 UNITS: 100 INJECTION, SOLUTION INTRAVENOUS; SUBCUTANEOUS at 08:48

## 2019-02-24 RX ADMIN — ALBUTEROL SULFATE 2.5 MG: 2.5 SOLUTION RESPIRATORY (INHALATION) at 14:29

## 2019-02-24 RX ADMIN — ALBUTEROL SULFATE 2.5 MG: 2.5 SOLUTION RESPIRATORY (INHALATION) at 22:31

## 2019-02-24 RX ADMIN — ALBUTEROL SULFATE 2.5 MG: 2.5 SOLUTION RESPIRATORY (INHALATION) at 07:57

## 2019-02-24 RX ADMIN — CEFAZOLIN 3 G: 10 INJECTION, POWDER, FOR SOLUTION INTRAVENOUS; PARENTERAL at 00:02

## 2019-02-24 RX ADMIN — HYDROCODONE BITARTRATE AND ACETAMINOPHEN 1 TABLET: 5; 325 TABLET ORAL at 06:44

## 2019-02-24 RX ADMIN — METOPROLOL SUCCINATE 50 MG: 50 TABLET, EXTENDED RELEASE ORAL at 09:06

## 2019-02-24 RX ADMIN — SODIUM CHLORIDE, PRESERVATIVE FREE 3 ML: 5 INJECTION INTRAVENOUS at 08:46

## 2019-02-24 RX ADMIN — ALLOPURINOL 100 MG: 100 TABLET ORAL at 08:44

## 2019-02-24 RX ADMIN — ENOXAPARIN SODIUM 30 MG: 30 INJECTION SUBCUTANEOUS at 08:44

## 2019-02-24 RX ADMIN — SODIUM CHLORIDE 125 MG: 9 INJECTION, SOLUTION INTRAVENOUS at 10:16

## 2019-02-24 RX ADMIN — SODIUM CHLORIDE, PRESERVATIVE FREE 3 ML: 5 INJECTION INTRAVENOUS at 22:07

## 2019-02-24 RX ADMIN — ASPIRIN 81 MG: 81 TABLET, COATED ORAL at 08:43

## 2019-02-24 RX ADMIN — HYDROCODONE BITARTRATE AND ACETAMINOPHEN 1 TABLET: 5; 325 TABLET ORAL at 17:20

## 2019-02-24 RX ADMIN — INSULIN LISPRO 2 UNITS: 100 INJECTION, SOLUTION INTRAVENOUS; SUBCUTANEOUS at 22:06

## 2019-02-24 RX ADMIN — ROPIVACAINE HYDROCHLORIDE 8 ML/HR: 2 INJECTION, SOLUTION EPIDURAL; INFILTRATION at 14:16

## 2019-02-24 RX ADMIN — INSULIN LISPRO 6 UNITS: 100 INJECTION, SOLUTION INTRAVENOUS; SUBCUTANEOUS at 08:49

## 2019-02-24 RX ADMIN — INSULIN LISPRO 2 UNITS: 100 INJECTION, SOLUTION INTRAVENOUS; SUBCUTANEOUS at 17:15

## 2019-02-24 RX ADMIN — CITALOPRAM HYDROBROMIDE 10 MG: 20 TABLET ORAL at 08:45

## 2019-02-24 RX ADMIN — CEFAZOLIN 3 G: 10 INJECTION, POWDER, FOR SOLUTION INTRAVENOUS; PARENTERAL at 06:32

## 2019-02-24 RX ADMIN — INSULIN LISPRO 4 UNITS: 100 INJECTION, SOLUTION INTRAVENOUS; SUBCUTANEOUS at 13:30

## 2019-02-24 RX ADMIN — INSULIN LISPRO 2 UNITS: 100 INJECTION, SOLUTION INTRAVENOUS; SUBCUTANEOUS at 13:29

## 2019-02-25 ENCOUNTER — APPOINTMENT (OUTPATIENT)
Dept: GENERAL RADIOLOGY | Facility: HOSPITAL | Age: 84
End: 2019-02-25

## 2019-02-25 PROBLEM — E86.0 DEHYDRATION: Status: RESOLVED | Noted: 2019-02-22 | Resolved: 2019-02-25

## 2019-02-25 PROBLEM — S82.831A CLOSED FRACTURE OF DISTAL END OF RIGHT FIBULA: Status: RESOLVED | Noted: 2019-02-22 | Resolved: 2019-02-25

## 2019-02-25 PROBLEM — J96.01 ACUTE RESPIRATORY FAILURE WITH HYPOXIA: Status: ACTIVE | Noted: 2019-02-25

## 2019-02-25 PROBLEM — I50.23 ACUTE ON CHRONIC SYSTOLIC CONGESTIVE HEART FAILURE (HCC): Status: ACTIVE | Noted: 2019-02-22

## 2019-02-25 PROBLEM — I24.8 DEMAND ISCHEMIA (HCC): Status: ACTIVE | Noted: 2019-02-25

## 2019-02-25 PROBLEM — I25.119 CORONARY ARTERY DISEASE INVOLVING NATIVE CORONARY ARTERY OF NATIVE HEART WITH ANGINA PECTORIS (HCC): Status: ACTIVE | Noted: 2019-02-25

## 2019-02-25 LAB
ALBUMIN SERPL-MCNC: 3.71 G/DL (ref 3.2–4.8)
ANION GAP SERPL CALCULATED.3IONS-SCNC: 6 MMOL/L (ref 3–11)
ANION GAP SERPL CALCULATED.3IONS-SCNC: 8 MMOL/L (ref 3–11)
ARTERIAL PATENCY WRIST A: ABNORMAL
ATMOSPHERIC PRESS: ABNORMAL MMHG
BASE EXCESS BLDA CALC-SCNC: -7.2 MMOL/L (ref 0–2)
BASOPHILS # BLD AUTO: 0.02 10*3/MM3 (ref 0–0.2)
BASOPHILS NFR BLD AUTO: 0.2 % (ref 0–1)
BDY SITE: ABNORMAL
BNP SERPL-MCNC: 1055 PG/ML (ref 0–100)
BODY TEMPERATURE: 37 C
BUN BLD-MCNC: 43 MG/DL (ref 9–23)
BUN BLD-MCNC: 44 MG/DL (ref 9–23)
BUN/CREAT SERPL: 22.3 (ref 7–25)
BUN/CREAT SERPL: 22.6 (ref 7–25)
CALCIUM SPEC-SCNC: 8.2 MG/DL (ref 8.7–10.4)
CALCIUM SPEC-SCNC: 8.4 MG/DL (ref 8.7–10.4)
CHLORIDE SERPL-SCNC: 105 MMOL/L (ref 99–109)
CHLORIDE SERPL-SCNC: 105 MMOL/L (ref 99–109)
CO2 BLDA-SCNC: 22.7 MMOL/L (ref 23–27)
CO2 SERPL-SCNC: 20 MMOL/L (ref 20–31)
CO2 SERPL-SCNC: 23 MMOL/L (ref 20–31)
COHGB MFR BLD: 1.1 % (ref 0–2)
CREAT BLD-MCNC: 1.9 MG/DL (ref 0.6–1.3)
CREAT BLD-MCNC: 1.97 MG/DL (ref 0.6–1.3)
DEPRECATED RDW RBC AUTO: 55.1 FL (ref 37–54)
EOSINOPHIL # BLD AUTO: 0.03 10*3/MM3 (ref 0–0.3)
EOSINOPHIL NFR BLD AUTO: 0.3 % (ref 0–3)
EPAP: 0
ERYTHROCYTE [DISTWIDTH] IN BLOOD BY AUTOMATED COUNT: 15.6 % (ref 11.3–14.5)
GFR SERPL CREATININE-BSD FRML MDRD: 33 ML/MIN/1.73
GFR SERPL CREATININE-BSD FRML MDRD: 34 ML/MIN/1.73
GLUCOSE BLD-MCNC: 200 MG/DL (ref 70–100)
GLUCOSE BLD-MCNC: 210 MG/DL (ref 70–100)
GLUCOSE BLDC GLUCOMTR-MCNC: 167 MG/DL (ref 70–130)
GLUCOSE BLDC GLUCOMTR-MCNC: 179 MG/DL (ref 70–130)
GLUCOSE BLDC GLUCOMTR-MCNC: 199 MG/DL (ref 70–130)
HCO3 BLDA-SCNC: 21 MMOL/L (ref 20–26)
HCT VFR BLD AUTO: 32.7 % (ref 38.9–50.9)
HCT VFR BLD CALC: 33 %
HGB BLD-MCNC: 10.8 G/DL (ref 13.1–17.5)
HGB BLDA-MCNC: 10.8 G/DL (ref 13.5–17.5)
HOROWITZ INDEX BLD+IHG-RTO: 100 %
IMM GRANULOCYTES # BLD AUTO: 0.04 10*3/MM3 (ref 0–0.05)
IMM GRANULOCYTES NFR BLD AUTO: 0.4 % (ref 0–0.6)
IPAP: 0
LYMPHOCYTES # BLD AUTO: 1.64 10*3/MM3 (ref 0.6–4.8)
LYMPHOCYTES NFR BLD AUTO: 17.8 % (ref 24–44)
Lab: ABNORMAL
MCH RBC QN AUTO: 32.2 PG (ref 27–31)
MCHC RBC AUTO-ENTMCNC: 33 G/DL (ref 32–36)
MCV RBC AUTO: 97.6 FL (ref 80–99)
METHGB BLD QL: 1.1 % (ref 0–1.5)
MODALITY: ABNORMAL
MONOCYTES # BLD AUTO: 1.1 10*3/MM3 (ref 0–1)
MONOCYTES NFR BLD AUTO: 12 % (ref 0–12)
NEUTROPHILS # BLD AUTO: 6.37 10*3/MM3 (ref 1.5–8.3)
NEUTROPHILS NFR BLD AUTO: 69.3 % (ref 41–71)
NOTE: ABNORMAL
NOTIFIED BY: ABNORMAL
NOTIFIED WHO: ABNORMAL
OXYHGB MFR BLDV: 97.7 % (ref 94–99)
PAW @ PEAK INSP FLOW SETTING VENT: 0 CMH2O
PCO2 BLDA: 54.2 MM HG
PCO2 TEMP ADJ BLD: 54.2 MM HG (ref 35–48)
PH BLDA: 7.2 PH UNITS (ref 7.35–7.45)
PH, TEMP CORRECTED: 7.2 PH UNITS
PHOSPHATE SERPL-MCNC: 3.5 MG/DL (ref 2.4–5.1)
PLATELET # BLD AUTO: 201 10*3/MM3 (ref 150–450)
PMV BLD AUTO: 9 FL (ref 6–12)
PO2 BLDA: 406 MM HG (ref 83–108)
PO2 TEMP ADJ BLD: 406 MM HG (ref 83–108)
POTASSIUM BLD-SCNC: 5.2 MMOL/L (ref 3.5–5.5)
POTASSIUM BLD-SCNC: 5.2 MMOL/L (ref 3.5–5.5)
RBC # BLD AUTO: 3.35 10*6/MM3 (ref 4.2–5.76)
SODIUM BLD-SCNC: 133 MMOL/L (ref 132–146)
SODIUM BLD-SCNC: 134 MMOL/L (ref 132–146)
TOTAL RATE: 0 BREATHS/MINUTE
TROPONIN I SERPL-MCNC: 0.61 NG/ML
TROPONIN I SERPL-MCNC: 0.92 NG/ML
TROPONIN I SERPL-MCNC: 1.03 NG/ML
TROPONIN I SERPL-MCNC: 1.18 NG/ML
WBC NRBC COR # BLD: 9.2 10*3/MM3 (ref 3.5–10.8)

## 2019-02-25 PROCEDURE — 36600 WITHDRAWAL OF ARTERIAL BLOOD: CPT

## 2019-02-25 PROCEDURE — 80069 RENAL FUNCTION PANEL: CPT | Performed by: INTERNAL MEDICINE

## 2019-02-25 PROCEDURE — 25010000002 LORAZEPAM PER 2 MG

## 2019-02-25 PROCEDURE — 85025 COMPLETE CBC W/AUTO DIFF WBC: CPT | Performed by: INTERNAL MEDICINE

## 2019-02-25 PROCEDURE — 25010000002 NA FERRIC GLUC CPLX PER 12.5 MG: Performed by: INTERNAL MEDICINE

## 2019-02-25 PROCEDURE — 63710000001 NITROGLYCERIN 0.4 MG SUBLINGUAL TABLET 25 EACH BOTTLE

## 2019-02-25 PROCEDURE — 83880 ASSAY OF NATRIURETIC PEPTIDE: CPT | Performed by: INTERNAL MEDICINE

## 2019-02-25 PROCEDURE — 80048 BASIC METABOLIC PNL TOTAL CA: CPT | Performed by: INTERNAL MEDICINE

## 2019-02-25 PROCEDURE — 25010000002 FUROSEMIDE PER 20 MG: Performed by: INTERNAL MEDICINE

## 2019-02-25 PROCEDURE — A9270 NON-COVERED ITEM OR SERVICE: HCPCS | Performed by: INTERNAL MEDICINE

## 2019-02-25 PROCEDURE — 84484 ASSAY OF TROPONIN QUANT: CPT | Performed by: INTERNAL MEDICINE

## 2019-02-25 PROCEDURE — A9270 NON-COVERED ITEM OR SERVICE: HCPCS

## 2019-02-25 PROCEDURE — 93010 ELECTROCARDIOGRAM REPORT: CPT | Performed by: INTERNAL MEDICINE

## 2019-02-25 PROCEDURE — 94660 CPAP INITIATION&MGMT: CPT

## 2019-02-25 PROCEDURE — 82805 BLOOD GASES W/O2 SATURATION: CPT

## 2019-02-25 PROCEDURE — 94799 UNLISTED PULMONARY SVC/PX: CPT

## 2019-02-25 PROCEDURE — 25010000002 ENOXAPARIN PER 10 MG: Performed by: ORTHOPAEDIC SURGERY

## 2019-02-25 PROCEDURE — 99024 POSTOP FOLLOW-UP VISIT: CPT | Performed by: ORTHOPAEDIC SURGERY

## 2019-02-25 PROCEDURE — 63710000001 HYDROCODONE-ACETAMINOPHEN 5-325 MG TABLET: Performed by: INTERNAL MEDICINE

## 2019-02-25 PROCEDURE — 99222 1ST HOSP IP/OBS MODERATE 55: CPT | Performed by: INTERNAL MEDICINE

## 2019-02-25 PROCEDURE — 71045 X-RAY EXAM CHEST 1 VIEW: CPT

## 2019-02-25 PROCEDURE — 93005 ELECTROCARDIOGRAM TRACING: CPT | Performed by: INTERNAL MEDICINE

## 2019-02-25 PROCEDURE — 99291 CRITICAL CARE FIRST HOUR: CPT | Performed by: INTERNAL MEDICINE

## 2019-02-25 PROCEDURE — 82962 GLUCOSE BLOOD TEST: CPT

## 2019-02-25 PROCEDURE — 99233 SBSQ HOSP IP/OBS HIGH 50: CPT | Performed by: NURSE PRACTITIONER

## 2019-02-25 RX ORDER — LORAZEPAM 2 MG/ML
0.5 INJECTION INTRAMUSCULAR ONCE
Status: COMPLETED | OUTPATIENT
Start: 2019-02-25 | End: 2019-02-25

## 2019-02-25 RX ORDER — NITROGLYCERIN 0.4 MG/1
0.4 TABLET SUBLINGUAL
Status: DISCONTINUED | OUTPATIENT
Start: 2019-02-25 | End: 2019-03-04 | Stop reason: HOSPADM

## 2019-02-25 RX ORDER — FUROSEMIDE 40 MG/1
40 TABLET ORAL DAILY
Status: DISCONTINUED | OUTPATIENT
Start: 2019-02-26 | End: 2019-02-25

## 2019-02-25 RX ORDER — NITROGLYCERIN 0.4 MG/1
TABLET SUBLINGUAL
Status: COMPLETED
Start: 2019-02-25 | End: 2019-02-25

## 2019-02-25 RX ORDER — FUROSEMIDE 10 MG/ML
60 INJECTION INTRAMUSCULAR; INTRAVENOUS ONCE
Status: COMPLETED | OUTPATIENT
Start: 2019-02-25 | End: 2019-02-25

## 2019-02-25 RX ORDER — LORAZEPAM 2 MG/ML
INJECTION INTRAMUSCULAR
Status: COMPLETED
Start: 2019-02-25 | End: 2019-02-25

## 2019-02-25 RX ORDER — ALBUTEROL SULFATE 2.5 MG/3ML
2.5 SOLUTION RESPIRATORY (INHALATION) EVERY 8 HOURS PRN
Status: DISCONTINUED | OUTPATIENT
Start: 2019-02-25 | End: 2019-02-26

## 2019-02-25 RX ORDER — BUMETANIDE 0.25 MG/ML
2 INJECTION INTRAMUSCULAR; INTRAVENOUS ONCE
Status: COMPLETED | OUTPATIENT
Start: 2019-02-25 | End: 2019-02-25

## 2019-02-25 RX ADMIN — ISOSORBIDE DINITRATE: 20 TABLET ORAL at 18:11

## 2019-02-25 RX ADMIN — CITALOPRAM HYDROBROMIDE 10 MG: 20 TABLET ORAL at 11:00

## 2019-02-25 RX ADMIN — ENOXAPARIN SODIUM 30 MG: 30 INJECTION SUBCUTANEOUS at 11:01

## 2019-02-25 RX ADMIN — ISOSORBIDE DINITRATE: 20 TABLET ORAL at 21:16

## 2019-02-25 RX ADMIN — METOPROLOL SUCCINATE 50 MG: 50 TABLET, EXTENDED RELEASE ORAL at 11:00

## 2019-02-25 RX ADMIN — NITROGLYCERIN 0.4 MG: 0.4 TABLET SUBLINGUAL at 07:58

## 2019-02-25 RX ADMIN — SODIUM CHLORIDE 125 MG: 9 INJECTION, SOLUTION INTRAVENOUS at 10:59

## 2019-02-25 RX ADMIN — CYANOCOBALAMIN TAB 1000 MCG 1000 MCG: 1000 TAB at 11:00

## 2019-02-25 RX ADMIN — HYDROCODONE BITARTRATE AND ACETAMINOPHEN 1 TABLET: 5; 325 TABLET ORAL at 02:04

## 2019-02-25 RX ADMIN — ALLOPURINOL 100 MG: 100 TABLET ORAL at 10:59

## 2019-02-25 RX ADMIN — SODIUM CHLORIDE, PRESERVATIVE FREE 3 ML: 5 INJECTION INTRAVENOUS at 11:01

## 2019-02-25 RX ADMIN — LORAZEPAM 0.5 MG: 2 INJECTION INTRAMUSCULAR; INTRAVENOUS at 07:55

## 2019-02-25 RX ADMIN — LORAZEPAM 0.5 MG: 2 INJECTION INTRAMUSCULAR at 07:55

## 2019-02-25 RX ADMIN — ALBUTEROL SULFATE 2.5 MG: 2.5 SOLUTION RESPIRATORY (INHALATION) at 12:29

## 2019-02-25 RX ADMIN — ATORVASTATIN CALCIUM 10 MG: 10 TABLET, FILM COATED ORAL at 10:59

## 2019-02-25 RX ADMIN — NITROGLYCERIN: 0.4 TABLET SUBLINGUAL at 07:46

## 2019-02-25 RX ADMIN — FUROSEMIDE 60 MG: 10 INJECTION, SOLUTION INTRAMUSCULAR; INTRAVENOUS at 08:16

## 2019-02-25 RX ADMIN — ALBUTEROL SULFATE 2.5 MG: 2.5 SOLUTION RESPIRATORY (INHALATION) at 20:22

## 2019-02-25 RX ADMIN — BUMETANIDE 2 MG: 0.25 INJECTION INTRAMUSCULAR; INTRAVENOUS at 16:04

## 2019-02-25 RX ADMIN — ASPIRIN 81 MG: 81 TABLET, COATED ORAL at 10:59

## 2019-02-26 ENCOUNTER — APPOINTMENT (OUTPATIENT)
Dept: CARDIOLOGY | Facility: HOSPITAL | Age: 84
End: 2019-02-26

## 2019-02-26 PROBLEM — G47.33 OSA (OBSTRUCTIVE SLEEP APNEA): Status: ACTIVE | Noted: 2019-02-26

## 2019-02-26 LAB
ALBUMIN SERPL-MCNC: 3.76 G/DL (ref 3.2–4.8)
ALBUMIN/GLOB SERPL: 1.8 G/DL (ref 1.5–2.5)
ALP SERPL-CCNC: 71 U/L (ref 25–100)
ALT SERPL W P-5'-P-CCNC: 18 U/L (ref 7–40)
ANION GAP SERPL CALCULATED.3IONS-SCNC: 7 MMOL/L (ref 3–11)
ANION GAP SERPL CALCULATED.3IONS-SCNC: 8 MMOL/L (ref 3–11)
ARTERIAL PATENCY WRIST A: ABNORMAL
AST SERPL-CCNC: 28 U/L (ref 0–33)
ATMOSPHERIC PRESS: ABNORMAL MMHG
BASE EXCESS BLDA CALC-SCNC: -2.4 MMOL/L (ref 0–2)
BASOPHILS # BLD AUTO: 0.02 10*3/MM3 (ref 0–0.2)
BASOPHILS NFR BLD AUTO: 0.3 % (ref 0–1)
BH CV ECHO MEAS - AO MAX PG (FULL): 18.8 MMHG
BH CV ECHO MEAS - AO MAX PG: 21.6 MMHG
BH CV ECHO MEAS - AO MEAN PG (FULL): 9.7 MMHG
BH CV ECHO MEAS - AO MEAN PG: 11.4 MMHG
BH CV ECHO MEAS - AO ROOT AREA (BSA CORRECTED): 1.7
BH CV ECHO MEAS - AO ROOT AREA: 13 CM^2
BH CV ECHO MEAS - AO ROOT DIAM: 4.1 CM
BH CV ECHO MEAS - AO V2 MAX: 232.4 CM/SEC
BH CV ECHO MEAS - AO V2 MEAN: 158.5 CM/SEC
BH CV ECHO MEAS - AO V2 VTI: 35.7 CM
BH CV ECHO MEAS - AVA(I,A): 1.5 CM^2
BH CV ECHO MEAS - AVA(I,D): 1.5 CM^2
BH CV ECHO MEAS - AVA(V,A): 1.1 CM^2
BH CV ECHO MEAS - AVA(V,D): 1.1 CM^2
BH CV ECHO MEAS - BSA(HAYCOCK): 2.5 M^2
BH CV ECHO MEAS - BSA: 2.4 M^2
BH CV ECHO MEAS - BZI_BMI: 36.7 KILOGRAMS/M^2
BH CV ECHO MEAS - BZI_METRIC_HEIGHT: 180.3 CM
BH CV ECHO MEAS - BZI_METRIC_WEIGHT: 119.3 KG
BH CV ECHO MEAS - EDV(CUBED): 147.5 ML
BH CV ECHO MEAS - EDV(MOD-SP2): 71 ML
BH CV ECHO MEAS - EDV(MOD-SP4): 156 ML
BH CV ECHO MEAS - EDV(TEICH): 134.4 ML
BH CV ECHO MEAS - EF(CUBED): 63.7 %
BH CV ECHO MEAS - EF(MOD-SP2): 52.1 %
BH CV ECHO MEAS - EF(MOD-SP4): 49.4 %
BH CV ECHO MEAS - EF(TEICH): 54.8 %
BH CV ECHO MEAS - ESV(CUBED): 53.6 ML
BH CV ECHO MEAS - ESV(MOD-SP2): 34 ML
BH CV ECHO MEAS - ESV(MOD-SP4): 79 ML
BH CV ECHO MEAS - ESV(TEICH): 60.8 ML
BH CV ECHO MEAS - FS: 28.7 %
BH CV ECHO MEAS - IVS/LVPW: 1
BH CV ECHO MEAS - IVSD: 1.2 CM
BH CV ECHO MEAS - LA DIMENSION: 4.5 CM
BH CV ECHO MEAS - LA/AO: 1.1
BH CV ECHO MEAS - LAD MAJOR: 4.5 CM
BH CV ECHO MEAS - LAT PEAK E' VEL: 9.4 CM/SEC
BH CV ECHO MEAS - LATERAL E/E' RATIO: 6.5
BH CV ECHO MEAS - LV DIASTOLIC VOL/BSA (35-75): 65.8 ML/M^2
BH CV ECHO MEAS - LV MASS(C)D: 259.8 GRAMS
BH CV ECHO MEAS - LV MASS(C)DI: 109.7 GRAMS/M^2
BH CV ECHO MEAS - LV MAX PG: 2.8 MMHG
BH CV ECHO MEAS - LV MEAN PG: 1.8 MMHG
BH CV ECHO MEAS - LV SYSTOLIC VOL/BSA (12-30): 33.3 ML/M^2
BH CV ECHO MEAS - LV V1 MAX: 83.9 CM/SEC
BH CV ECHO MEAS - LV V1 MEAN: 63.2 CM/SEC
BH CV ECHO MEAS - LV V1 VTI: 17.3 CM
BH CV ECHO MEAS - LVIDD: 5.3 CM
BH CV ECHO MEAS - LVIDS: 3.8 CM
BH CV ECHO MEAS - LVLD AP2: 7 CM
BH CV ECHO MEAS - LVLD AP4: 8.9 CM
BH CV ECHO MEAS - LVLS AP2: 6.3 CM
BH CV ECHO MEAS - LVLS AP4: 7.2 CM
BH CV ECHO MEAS - LVOT AREA (M): 3.1 CM^2
BH CV ECHO MEAS - LVOT AREA: 3.2 CM^2
BH CV ECHO MEAS - LVOT DIAM: 2 CM
BH CV ECHO MEAS - LVPWD: 1.2 CM
BH CV ECHO MEAS - MED PEAK E' VEL: 4.2 CM/SEC
BH CV ECHO MEAS - MEDIAL E/E' RATIO: 14.4
BH CV ECHO MEAS - MV A MAX VEL: 100.2 CM/SEC
BH CV ECHO MEAS - MV DEC TIME: 0.23 SEC
BH CV ECHO MEAS - MV E MAX VEL: 62.2 CM/SEC
BH CV ECHO MEAS - MV E/A: 0.62
BH CV ECHO MEAS - PA ACC SLOPE: 729.8 CM/SEC^2
BH CV ECHO MEAS - PA ACC TIME: 0.12 SEC
BH CV ECHO MEAS - PA PR(ACCEL): 25.1 MMHG
BH CV ECHO MEAS - RAP SYSTOLE: 8 MMHG
BH CV ECHO MEAS - RVSP: 64 MMHG
BH CV ECHO MEAS - SI(AO): 195.6 ML/M^2
BH CV ECHO MEAS - SI(CUBED): 39.6 ML/M^2
BH CV ECHO MEAS - SI(LVOT): 23.2 ML/M^2
BH CV ECHO MEAS - SI(MOD-SP2): 15.6 ML/M^2
BH CV ECHO MEAS - SI(MOD-SP4): 32.5 ML/M^2
BH CV ECHO MEAS - SI(TEICH): 31.1 ML/M^2
BH CV ECHO MEAS - SV(AO): 463.5 ML
BH CV ECHO MEAS - SV(CUBED): 93.9 ML
BH CV ECHO MEAS - SV(LVOT): 55.1 ML
BH CV ECHO MEAS - SV(MOD-SP2): 37 ML
BH CV ECHO MEAS - SV(MOD-SP4): 77 ML
BH CV ECHO MEAS - SV(TEICH): 73.6 ML
BH CV ECHO MEAS - TAPSE (>1.6): 1 CM2
BH CV ECHO MEAS - TR MAX PG: 56 MMHG
BH CV ECHO MEAS - TR MAX VEL: 371.5 CM/SEC
BH CV ECHO MEASUREMENTS AVERAGE E/E' RATIO: 9.15
BH CV VAS BP RIGHT ARM: NORMAL MMHG
BH CV XLRA - RV BASE: 4.6 CM
BH CV XLRA - RV LENGTH: 6.8 CM
BH CV XLRA - RV MID: 3.3 CM
BH CV XLRA - TDI S': 8.6 CM/SEC
BILIRUB SERPL-MCNC: 0.7 MG/DL (ref 0.3–1.2)
BNP SERPL-MCNC: 1111 PG/ML (ref 0–100)
BNP SERPL-MCNC: 657 PG/ML (ref 0–100)
BODY TEMPERATURE: 37 C
BUN BLD-MCNC: 41 MG/DL (ref 9–23)
BUN BLD-MCNC: 44 MG/DL (ref 9–23)
BUN/CREAT SERPL: 25.6 (ref 7–25)
BUN/CREAT SERPL: 26 (ref 7–25)
CALCIUM SPEC-SCNC: 8.5 MG/DL (ref 8.7–10.4)
CALCIUM SPEC-SCNC: 8.6 MG/DL (ref 8.7–10.4)
CHLORIDE SERPL-SCNC: 101 MMOL/L (ref 99–109)
CHLORIDE SERPL-SCNC: 103 MMOL/L (ref 99–109)
CO2 BLDA-SCNC: 24.2 MMOL/L (ref 23–27)
CO2 SERPL-SCNC: 22 MMOL/L (ref 20–31)
CO2 SERPL-SCNC: 24 MMOL/L (ref 20–31)
COHGB MFR BLD: 1.2 % (ref 0–2)
CREAT BLD-MCNC: 1.6 MG/DL (ref 0.6–1.3)
CREAT BLD-MCNC: 1.69 MG/DL (ref 0.6–1.3)
DEPRECATED RDW RBC AUTO: 56.2 FL (ref 37–54)
EOSINOPHIL # BLD AUTO: 0.26 10*3/MM3 (ref 0–0.3)
EOSINOPHIL NFR BLD AUTO: 3.7 % (ref 0–3)
EPAP: 0
ERYTHROCYTE [DISTWIDTH] IN BLOOD BY AUTOMATED COUNT: 15.5 % (ref 11.3–14.5)
GFR SERPL CREATININE-BSD FRML MDRD: 39 ML/MIN/1.73
GFR SERPL CREATININE-BSD FRML MDRD: 42 ML/MIN/1.73
GLOBULIN UR ELPH-MCNC: 2.1 GM/DL
GLUCOSE BLD-MCNC: 159 MG/DL (ref 70–100)
GLUCOSE BLD-MCNC: 167 MG/DL (ref 70–100)
GLUCOSE BLDC GLUCOMTR-MCNC: 151 MG/DL (ref 70–130)
GLUCOSE BLDC GLUCOMTR-MCNC: 180 MG/DL (ref 70–130)
GLUCOSE BLDC GLUCOMTR-MCNC: 241 MG/DL (ref 70–130)
GLUCOSE BLDC GLUCOMTR-MCNC: 264 MG/DL (ref 70–130)
GLUCOSE BLDC GLUCOMTR-MCNC: 276 MG/DL (ref 70–130)
HCO3 BLDA-SCNC: 23 MMOL/L (ref 20–26)
HCT VFR BLD AUTO: 30.2 % (ref 38.9–50.9)
HCT VFR BLD CALC: 30.9 %
HGB BLD-MCNC: 9.7 G/DL (ref 13.1–17.5)
HGB BLDA-MCNC: 10.1 G/DL (ref 13.5–17.5)
HOROWITZ INDEX BLD+IHG-RTO: 40 %
IMM GRANULOCYTES # BLD AUTO: 0.01 10*3/MM3 (ref 0–0.05)
IMM GRANULOCYTES NFR BLD AUTO: 0.1 % (ref 0–0.6)
IPAP: 0
LEFT ATRIUM VOLUME INDEX: 20.7 ML/M^2
LEFT ATRIUM VOLUME: 49 ML
LV EF 2D ECHO EST: 45 %
LYMPHOCYTES # BLD AUTO: 1.88 10*3/MM3 (ref 0.6–4.8)
LYMPHOCYTES NFR BLD AUTO: 26.8 % (ref 24–44)
MAGNESIUM SERPL-MCNC: 1.8 MG/DL (ref 1.3–2.7)
MAGNESIUM SERPL-MCNC: 2 MG/DL (ref 1.3–2.7)
MAXIMAL PREDICTED HEART RATE: 138 BPM
MCH RBC QN AUTO: 31.7 PG (ref 27–31)
MCHC RBC AUTO-ENTMCNC: 32.1 G/DL (ref 32–36)
MCV RBC AUTO: 98.7 FL (ref 80–99)
METHGB BLD QL: 1.1 % (ref 0–1.5)
MODALITY: ABNORMAL
MONOCYTES # BLD AUTO: 0.54 10*3/MM3 (ref 0–1)
MONOCYTES NFR BLD AUTO: 7.7 % (ref 0–12)
NEUTROPHILS # BLD AUTO: 4.32 10*3/MM3 (ref 1.5–8.3)
NEUTROPHILS NFR BLD AUTO: 61.5 % (ref 41–71)
NOTE: ABNORMAL
OXYHGB MFR BLDV: 89.6 % (ref 94–99)
PAW @ PEAK INSP FLOW SETTING VENT: 0 CMH2O
PCO2 BLDA: 41.1 MM HG
PCO2 TEMP ADJ BLD: 41.1 MM HG (ref 35–48)
PH BLDA: 7.36 PH UNITS (ref 7.35–7.45)
PH, TEMP CORRECTED: 7.36 PH UNITS
PHOSPHATE SERPL-MCNC: 2.3 MG/DL (ref 2.4–5.1)
PLATELET # BLD AUTO: 183 10*3/MM3 (ref 150–450)
PMV BLD AUTO: 9.1 FL (ref 6–12)
PO2 BLDA: 60.5 MM HG (ref 83–108)
PO2 TEMP ADJ BLD: 60.5 MM HG (ref 83–108)
POTASSIUM BLD-SCNC: 4.2 MMOL/L (ref 3.5–5.5)
POTASSIUM BLD-SCNC: 4.3 MMOL/L (ref 3.5–5.5)
PROT SERPL-MCNC: 5.9 G/DL (ref 5.7–8.2)
RBC # BLD AUTO: 3.06 10*6/MM3 (ref 4.2–5.76)
SODIUM BLD-SCNC: 132 MMOL/L (ref 132–146)
SODIUM BLD-SCNC: 133 MMOL/L (ref 132–146)
STRESS TARGET HR: 117 BPM
TOTAL RATE: 0 BREATHS/MINUTE
TROPONIN I SERPL-MCNC: 0.52 NG/ML
TROPONIN I SERPL-MCNC: 0.63 NG/ML
TROPONIN I SERPL-MCNC: 0.95 NG/ML
WBC NRBC COR # BLD: 7.02 10*3/MM3 (ref 3.5–10.8)

## 2019-02-26 PROCEDURE — 63710000001 INSULIN LISPRO (HUMAN) PER 5 UNITS: Performed by: INTERNAL MEDICINE

## 2019-02-26 PROCEDURE — 80053 COMPREHEN METABOLIC PANEL: CPT | Performed by: FAMILY MEDICINE

## 2019-02-26 PROCEDURE — 83735 ASSAY OF MAGNESIUM: CPT | Performed by: INTERNAL MEDICINE

## 2019-02-26 PROCEDURE — 99233 SBSQ HOSP IP/OBS HIGH 50: CPT | Performed by: INTERNAL MEDICINE

## 2019-02-26 PROCEDURE — 93005 ELECTROCARDIOGRAM TRACING: CPT | Performed by: INTERNAL MEDICINE

## 2019-02-26 PROCEDURE — 93306 TTE W/DOPPLER COMPLETE: CPT

## 2019-02-26 PROCEDURE — 63710000001 INSULIN DETEMIR PER 5 UNITS: Performed by: INTERNAL MEDICINE

## 2019-02-26 PROCEDURE — 82962 GLUCOSE BLOOD TEST: CPT

## 2019-02-26 PROCEDURE — 84484 ASSAY OF TROPONIN QUANT: CPT | Performed by: NURSE PRACTITIONER

## 2019-02-26 PROCEDURE — 36600 WITHDRAWAL OF ARTERIAL BLOOD: CPT

## 2019-02-26 PROCEDURE — 93306 TTE W/DOPPLER COMPLETE: CPT | Performed by: INTERNAL MEDICINE

## 2019-02-26 PROCEDURE — 94799 UNLISTED PULMONARY SVC/PX: CPT

## 2019-02-26 PROCEDURE — 84484 ASSAY OF TROPONIN QUANT: CPT | Performed by: INTERNAL MEDICINE

## 2019-02-26 PROCEDURE — 83880 ASSAY OF NATRIURETIC PEPTIDE: CPT | Performed by: INTERNAL MEDICINE

## 2019-02-26 PROCEDURE — 83880 ASSAY OF NATRIURETIC PEPTIDE: CPT | Performed by: FAMILY MEDICINE

## 2019-02-26 PROCEDURE — 99232 SBSQ HOSP IP/OBS MODERATE 35: CPT | Performed by: NURSE PRACTITIONER

## 2019-02-26 PROCEDURE — 83735 ASSAY OF MAGNESIUM: CPT | Performed by: FAMILY MEDICINE

## 2019-02-26 PROCEDURE — 25010000002 NA FERRIC GLUC CPLX PER 12.5 MG: Performed by: INTERNAL MEDICINE

## 2019-02-26 PROCEDURE — 84484 ASSAY OF TROPONIN QUANT: CPT | Performed by: FAMILY MEDICINE

## 2019-02-26 PROCEDURE — 93010 ELECTROCARDIOGRAM REPORT: CPT | Performed by: INTERNAL MEDICINE

## 2019-02-26 PROCEDURE — 25010000002 ENOXAPARIN PER 10 MG: Performed by: ORTHOPAEDIC SURGERY

## 2019-02-26 PROCEDURE — 99291 CRITICAL CARE FIRST HOUR: CPT | Performed by: FAMILY MEDICINE

## 2019-02-26 PROCEDURE — 82805 BLOOD GASES W/O2 SATURATION: CPT

## 2019-02-26 PROCEDURE — 93005 ELECTROCARDIOGRAM TRACING: CPT | Performed by: NURSE PRACTITIONER

## 2019-02-26 PROCEDURE — 99024 POSTOP FOLLOW-UP VISIT: CPT | Performed by: ORTHOPAEDIC SURGERY

## 2019-02-26 PROCEDURE — 85025 COMPLETE CBC W/AUTO DIFF WBC: CPT | Performed by: FAMILY MEDICINE

## 2019-02-26 PROCEDURE — 25010000002 ROPIVACAINE PER 1 MG: Performed by: ANESTHESIOLOGY

## 2019-02-26 PROCEDURE — 84100 ASSAY OF PHOSPHORUS: CPT | Performed by: INTERNAL MEDICINE

## 2019-02-26 PROCEDURE — 94660 CPAP INITIATION&MGMT: CPT

## 2019-02-26 RX ORDER — IPRATROPIUM BROMIDE AND ALBUTEROL SULFATE 2.5; .5 MG/3ML; MG/3ML
3 SOLUTION RESPIRATORY (INHALATION)
Status: DISCONTINUED | OUTPATIENT
Start: 2019-02-26 | End: 2019-03-04 | Stop reason: HOSPADM

## 2019-02-26 RX ORDER — MAGNESIUM SULFATE HEPTAHYDRATE 40 MG/ML
4 INJECTION, SOLUTION INTRAVENOUS AS NEEDED
Status: DISCONTINUED | OUTPATIENT
Start: 2019-02-26 | End: 2019-03-04 | Stop reason: HOSPADM

## 2019-02-26 RX ORDER — BUMETANIDE 0.25 MG/ML
1 INJECTION INTRAMUSCULAR; INTRAVENOUS ONCE
Status: COMPLETED | OUTPATIENT
Start: 2019-02-26 | End: 2019-02-26

## 2019-02-26 RX ORDER — BUMETANIDE 0.25 MG/ML
2 INJECTION INTRAMUSCULAR; INTRAVENOUS ONCE
Status: COMPLETED | OUTPATIENT
Start: 2019-02-26 | End: 2019-02-26

## 2019-02-26 RX ORDER — BUMETANIDE 0.25 MG/ML
2 INJECTION INTRAMUSCULAR; INTRAVENOUS ONCE
Status: COMPLETED | OUTPATIENT
Start: 2019-02-27 | End: 2019-02-27

## 2019-02-26 RX ADMIN — ALLOPURINOL 100 MG: 100 TABLET ORAL at 10:07

## 2019-02-26 RX ADMIN — HYDROCODONE BITARTRATE AND ACETAMINOPHEN 1 TABLET: 5; 325 TABLET ORAL at 10:33

## 2019-02-26 RX ADMIN — BUMETANIDE 2 MG: 0.25 INJECTION INTRAMUSCULAR; INTRAVENOUS at 06:16

## 2019-02-26 RX ADMIN — ROPIVACAINE HYDROCHLORIDE 8 ML/HR: 2 INJECTION, SOLUTION EPIDURAL; INFILTRATION at 12:38

## 2019-02-26 RX ADMIN — MAGNESIUM SULFATE HEPTAHYDRATE 4 G: 40 INJECTION, SOLUTION INTRAVENOUS at 15:13

## 2019-02-26 RX ADMIN — ATORVASTATIN CALCIUM 10 MG: 10 TABLET, FILM COATED ORAL at 10:04

## 2019-02-26 RX ADMIN — SODIUM CHLORIDE 125 MG: 9 INJECTION, SOLUTION INTRAVENOUS at 10:08

## 2019-02-26 RX ADMIN — ALBUTEROL SULFATE 2.5 MG: 2.5 SOLUTION RESPIRATORY (INHALATION) at 04:35

## 2019-02-26 RX ADMIN — CITALOPRAM HYDROBROMIDE 10 MG: 20 TABLET ORAL at 10:09

## 2019-02-26 RX ADMIN — INSULIN LISPRO 2 UNITS: 100 INJECTION, SOLUTION INTRAVENOUS; SUBCUTANEOUS at 13:23

## 2019-02-26 RX ADMIN — ISOSORBIDE DINITRATE: 20 TABLET ORAL at 21:31

## 2019-02-26 RX ADMIN — CYANOCOBALAMIN TAB 1000 MCG 1000 MCG: 1000 TAB at 10:04

## 2019-02-26 RX ADMIN — IPRATROPIUM BROMIDE AND ALBUTEROL SULFATE 3 ML: 2.5; .5 SOLUTION RESPIRATORY (INHALATION) at 19:34

## 2019-02-26 RX ADMIN — METOPROLOL SUCCINATE 50 MG: 50 TABLET, EXTENDED RELEASE ORAL at 10:04

## 2019-02-26 RX ADMIN — IPRATROPIUM BROMIDE AND ALBUTEROL SULFATE 3 ML: 2.5; .5 SOLUTION RESPIRATORY (INHALATION) at 15:32

## 2019-02-26 RX ADMIN — INSULIN LISPRO 4 UNITS: 100 INJECTION, SOLUTION INTRAVENOUS; SUBCUTANEOUS at 17:04

## 2019-02-26 RX ADMIN — ENOXAPARIN SODIUM 30 MG: 30 INJECTION SUBCUTANEOUS at 10:08

## 2019-02-26 RX ADMIN — SODIUM CHLORIDE, PRESERVATIVE FREE 3 ML: 5 INJECTION INTRAVENOUS at 21:35

## 2019-02-26 RX ADMIN — ISOSORBIDE DINITRATE: 20 TABLET ORAL at 14:08

## 2019-02-26 RX ADMIN — INSULIN LISPRO 2 UNITS: 100 INJECTION, SOLUTION INTRAVENOUS; SUBCUTANEOUS at 17:05

## 2019-02-26 RX ADMIN — FLUTICASONE PROPIONATE 2 SPRAY: 50 SPRAY, METERED NASAL at 10:19

## 2019-02-26 RX ADMIN — INSULIN DETEMIR 10 UNITS: 100 INJECTION, SOLUTION SUBCUTANEOUS at 21:32

## 2019-02-26 RX ADMIN — INSULIN LISPRO 4 UNITS: 100 INJECTION, SOLUTION INTRAVENOUS; SUBCUTANEOUS at 21:32

## 2019-02-26 RX ADMIN — ASPIRIN 81 MG: 81 TABLET, COATED ORAL at 10:04

## 2019-02-26 RX ADMIN — BUMETANIDE 1 MG: 0.25 INJECTION INTRAMUSCULAR; INTRAVENOUS at 15:13

## 2019-02-26 RX ADMIN — ISOSORBIDE DINITRATE: 20 TABLET ORAL at 06:16

## 2019-02-26 RX ADMIN — INSULIN LISPRO 6 UNITS: 100 INJECTION, SOLUTION INTRAVENOUS; SUBCUTANEOUS at 13:23

## 2019-02-27 ENCOUNTER — APPOINTMENT (OUTPATIENT)
Dept: GENERAL RADIOLOGY | Facility: HOSPITAL | Age: 84
End: 2019-02-27

## 2019-02-27 PROBLEM — I49.3 PREMATURE VENTRICULAR CONTRACTIONS (PVCS) (VPCS): Status: ACTIVE | Noted: 2019-02-27

## 2019-02-27 PROBLEM — N17.9 AKI (ACUTE KIDNEY INJURY) (HCC): Status: RESOLVED | Noted: 2019-02-22 | Resolved: 2019-02-27

## 2019-02-27 LAB
ALBUMIN SERPL-MCNC: 3.67 G/DL (ref 3.2–4.8)
ALBUMIN/GLOB SERPL: 1.7 G/DL (ref 1.5–2.5)
ALP SERPL-CCNC: 67 U/L (ref 25–100)
ALT SERPL W P-5'-P-CCNC: 14 U/L (ref 7–40)
ANION GAP SERPL CALCULATED.3IONS-SCNC: 9 MMOL/L (ref 3–11)
AST SERPL-CCNC: 24 U/L (ref 0–33)
BASOPHILS # BLD AUTO: 0.02 10*3/MM3 (ref 0–0.2)
BASOPHILS NFR BLD AUTO: 0.3 % (ref 0–1)
BILIRUB SERPL-MCNC: 0.9 MG/DL (ref 0.3–1.2)
BNP SERPL-MCNC: 593 PG/ML (ref 0–100)
BUN BLD-MCNC: 40 MG/DL (ref 9–23)
BUN/CREAT SERPL: 25.2 (ref 7–25)
CALCIUM SPEC-SCNC: 8.6 MG/DL (ref 8.7–10.4)
CHLORIDE SERPL-SCNC: 99 MMOL/L (ref 99–109)
CO2 SERPL-SCNC: 24 MMOL/L (ref 20–31)
CREAT BLD-MCNC: 1.59 MG/DL (ref 0.6–1.3)
DEPRECATED RDW RBC AUTO: 52.8 FL (ref 37–54)
EOSINOPHIL # BLD AUTO: 0.17 10*3/MM3 (ref 0–0.3)
EOSINOPHIL NFR BLD AUTO: 2.4 % (ref 0–3)
ERYTHROCYTE [DISTWIDTH] IN BLOOD BY AUTOMATED COUNT: 14.9 % (ref 11.3–14.5)
GFR SERPL CREATININE-BSD FRML MDRD: 42 ML/MIN/1.73
GLOBULIN UR ELPH-MCNC: 2.1 GM/DL
GLUCOSE BLD-MCNC: 173 MG/DL (ref 70–100)
GLUCOSE BLDC GLUCOMTR-MCNC: 209 MG/DL (ref 70–130)
GLUCOSE BLDC GLUCOMTR-MCNC: 270 MG/DL (ref 70–130)
GLUCOSE BLDC GLUCOMTR-MCNC: 282 MG/DL (ref 70–130)
GLUCOSE BLDC GLUCOMTR-MCNC: 294 MG/DL (ref 70–130)
HCT VFR BLD AUTO: 28.5 % (ref 38.9–50.9)
HGB BLD-MCNC: 9.2 G/DL (ref 13.1–17.5)
IMM GRANULOCYTES # BLD AUTO: 0.05 10*3/MM3 (ref 0–0.05)
IMM GRANULOCYTES NFR BLD AUTO: 0.7 % (ref 0–0.6)
LYMPHOCYTES # BLD AUTO: 1.36 10*3/MM3 (ref 0.6–4.8)
LYMPHOCYTES NFR BLD AUTO: 19.4 % (ref 24–44)
MAGNESIUM SERPL-MCNC: 2.2 MG/DL (ref 1.3–2.7)
MCH RBC QN AUTO: 31.1 PG (ref 27–31)
MCHC RBC AUTO-ENTMCNC: 32.3 G/DL (ref 32–36)
MCV RBC AUTO: 96.3 FL (ref 80–99)
MONOCYTES # BLD AUTO: 0.55 10*3/MM3 (ref 0–1)
MONOCYTES NFR BLD AUTO: 7.9 % (ref 0–12)
NEUTROPHILS # BLD AUTO: 4.9 10*3/MM3 (ref 1.5–8.3)
NEUTROPHILS NFR BLD AUTO: 70 % (ref 41–71)
PHOSPHATE SERPL-MCNC: 2.6 MG/DL (ref 2.4–5.1)
PLATELET # BLD AUTO: 229 10*3/MM3 (ref 150–450)
PMV BLD AUTO: 9.6 FL (ref 6–12)
POTASSIUM BLD-SCNC: 4.1 MMOL/L (ref 3.5–5.5)
PROT SERPL-MCNC: 5.8 G/DL (ref 5.7–8.2)
RBC # BLD AUTO: 2.96 10*6/MM3 (ref 4.2–5.76)
SODIUM BLD-SCNC: 132 MMOL/L (ref 132–146)
WBC NRBC COR # BLD: 7 10*3/MM3 (ref 3.5–10.8)

## 2019-02-27 PROCEDURE — 99232 SBSQ HOSP IP/OBS MODERATE 35: CPT | Performed by: INTERNAL MEDICINE

## 2019-02-27 PROCEDURE — 25010000002 NA FERRIC GLUC CPLX PER 12.5 MG: Performed by: INTERNAL MEDICINE

## 2019-02-27 PROCEDURE — 63710000001 INSULIN DETEMIR PER 5 UNITS: Performed by: INTERNAL MEDICINE

## 2019-02-27 PROCEDURE — 94799 UNLISTED PULMONARY SVC/PX: CPT

## 2019-02-27 PROCEDURE — 71045 X-RAY EXAM CHEST 1 VIEW: CPT

## 2019-02-27 PROCEDURE — 83735 ASSAY OF MAGNESIUM: CPT | Performed by: INTERNAL MEDICINE

## 2019-02-27 PROCEDURE — 73080 X-RAY EXAM OF ELBOW: CPT

## 2019-02-27 PROCEDURE — 97164 PT RE-EVAL EST PLAN CARE: CPT

## 2019-02-27 PROCEDURE — 84100 ASSAY OF PHOSPHORUS: CPT | Performed by: INTERNAL MEDICINE

## 2019-02-27 PROCEDURE — 85025 COMPLETE CBC W/AUTO DIFF WBC: CPT | Performed by: INTERNAL MEDICINE

## 2019-02-27 PROCEDURE — 25010000002 ENOXAPARIN PER 10 MG: Performed by: ORTHOPAEDIC SURGERY

## 2019-02-27 PROCEDURE — 82962 GLUCOSE BLOOD TEST: CPT

## 2019-02-27 PROCEDURE — 93010 ELECTROCARDIOGRAM REPORT: CPT | Performed by: INTERNAL MEDICINE

## 2019-02-27 PROCEDURE — 83880 ASSAY OF NATRIURETIC PEPTIDE: CPT | Performed by: INTERNAL MEDICINE

## 2019-02-27 PROCEDURE — 97530 THERAPEUTIC ACTIVITIES: CPT

## 2019-02-27 PROCEDURE — 99024 POSTOP FOLLOW-UP VISIT: CPT | Performed by: ORTHOPAEDIC SURGERY

## 2019-02-27 PROCEDURE — 80053 COMPREHEN METABOLIC PANEL: CPT | Performed by: INTERNAL MEDICINE

## 2019-02-27 RX ORDER — DOCUSATE SODIUM 100 MG/1
100 CAPSULE, LIQUID FILLED ORAL DAILY
Status: DISCONTINUED | OUTPATIENT
Start: 2019-02-27 | End: 2019-03-04 | Stop reason: HOSPADM

## 2019-02-27 RX ORDER — LISINOPRIL 10 MG/1
10 TABLET ORAL
Status: DISCONTINUED | OUTPATIENT
Start: 2019-02-27 | End: 2019-02-28

## 2019-02-27 RX ORDER — METOPROLOL SUCCINATE 100 MG/1
100 TABLET, EXTENDED RELEASE ORAL DAILY
Status: DISCONTINUED | OUTPATIENT
Start: 2019-02-27 | End: 2019-03-04 | Stop reason: HOSPADM

## 2019-02-27 RX ORDER — HYDROCODONE BITARTRATE AND ACETAMINOPHEN 5; 325 MG/1; MG/1
1 TABLET ORAL EVERY 6 HOURS PRN
Status: DISCONTINUED | OUTPATIENT
Start: 2019-02-27 | End: 2019-03-04 | Stop reason: HOSPADM

## 2019-02-27 RX ORDER — DOCUSATE SODIUM 100 MG/1
100 CAPSULE, LIQUID FILLED ORAL 2 TIMES DAILY PRN
Status: DISCONTINUED | OUTPATIENT
Start: 2019-02-27 | End: 2019-02-27 | Stop reason: SDUPTHER

## 2019-02-27 RX ORDER — BISACODYL 10 MG
10 SUPPOSITORY, RECTAL RECTAL DAILY PRN
Status: DISCONTINUED | OUTPATIENT
Start: 2019-02-27 | End: 2019-03-04 | Stop reason: HOSPADM

## 2019-02-27 RX ADMIN — IPRATROPIUM BROMIDE AND ALBUTEROL SULFATE 3 ML: 2.5; .5 SOLUTION RESPIRATORY (INHALATION) at 15:33

## 2019-02-27 RX ADMIN — INSULIN LISPRO 2 UNITS: 100 INJECTION, SOLUTION INTRAVENOUS; SUBCUTANEOUS at 11:45

## 2019-02-27 RX ADMIN — CITALOPRAM HYDROBROMIDE 10 MG: 20 TABLET ORAL at 08:25

## 2019-02-27 RX ADMIN — SODIUM CHLORIDE 125 MG: 9 INJECTION, SOLUTION INTRAVENOUS at 11:05

## 2019-02-27 RX ADMIN — IPRATROPIUM BROMIDE AND ALBUTEROL SULFATE 3 ML: 2.5; .5 SOLUTION RESPIRATORY (INHALATION) at 07:05

## 2019-02-27 RX ADMIN — ISOSORBIDE DINITRATE: 20 TABLET ORAL at 05:40

## 2019-02-27 RX ADMIN — CYANOCOBALAMIN TAB 1000 MCG 1000 MCG: 1000 TAB at 08:25

## 2019-02-27 RX ADMIN — ALBUTEROL SULFATE 2.5 MG: 2.5 SOLUTION RESPIRATORY (INHALATION) at 04:10

## 2019-02-27 RX ADMIN — BUMETANIDE 2 MG: 0.25 INJECTION INTRAMUSCULAR; INTRAVENOUS at 05:37

## 2019-02-27 RX ADMIN — SODIUM CHLORIDE, PRESERVATIVE FREE 3 ML: 5 INJECTION INTRAVENOUS at 20:50

## 2019-02-27 RX ADMIN — ISOSORBIDE DINITRATE: 20 TABLET ORAL at 16:27

## 2019-02-27 RX ADMIN — INSULIN LISPRO 6 UNITS: 100 INJECTION, SOLUTION INTRAVENOUS; SUBCUTANEOUS at 20:47

## 2019-02-27 RX ADMIN — INSULIN LISPRO 2 UNITS: 100 INJECTION, SOLUTION INTRAVENOUS; SUBCUTANEOUS at 08:25

## 2019-02-27 RX ADMIN — IPRATROPIUM BROMIDE AND ALBUTEROL SULFATE 3 ML: 2.5; .5 SOLUTION RESPIRATORY (INHALATION) at 11:48

## 2019-02-27 RX ADMIN — INSULIN LISPRO 6 UNITS: 100 INJECTION, SOLUTION INTRAVENOUS; SUBCUTANEOUS at 11:45

## 2019-02-27 RX ADMIN — FLUTICASONE PROPIONATE 2 SPRAY: 50 SPRAY, METERED NASAL at 08:53

## 2019-02-27 RX ADMIN — DOCUSATE SODIUM 100 MG: 100 CAPSULE, LIQUID FILLED ORAL at 12:10

## 2019-02-27 RX ADMIN — ASPIRIN 81 MG: 81 TABLET, COATED ORAL at 08:25

## 2019-02-27 RX ADMIN — SODIUM CHLORIDE, PRESERVATIVE FREE 3 ML: 5 INJECTION INTRAVENOUS at 08:26

## 2019-02-27 RX ADMIN — IPRATROPIUM BROMIDE AND ALBUTEROL SULFATE 3 ML: 2.5; .5 SOLUTION RESPIRATORY (INHALATION) at 19:52

## 2019-02-27 RX ADMIN — ISOSORBIDE DINITRATE: 20 TABLET ORAL at 05:36

## 2019-02-27 RX ADMIN — HYDROCODONE BITARTRATE AND ACETAMINOPHEN 1 TABLET: 5; 325 TABLET ORAL at 18:02

## 2019-02-27 RX ADMIN — METOPROLOL SUCCINATE 100 MG: 100 TABLET, FILM COATED, EXTENDED RELEASE ORAL at 09:57

## 2019-02-27 RX ADMIN — ISOSORBIDE DINITRATE: 20 TABLET ORAL at 20:47

## 2019-02-27 RX ADMIN — INSULIN LISPRO 4 UNITS: 100 INJECTION, SOLUTION INTRAVENOUS; SUBCUTANEOUS at 08:26

## 2019-02-27 RX ADMIN — INSULIN LISPRO 6 UNITS: 100 INJECTION, SOLUTION INTRAVENOUS; SUBCUTANEOUS at 16:29

## 2019-02-27 RX ADMIN — LISINOPRIL 10 MG: 10 TABLET ORAL at 12:10

## 2019-02-27 RX ADMIN — INSULIN DETEMIR 15 UNITS: 100 INJECTION, SOLUTION SUBCUTANEOUS at 20:48

## 2019-02-27 RX ADMIN — ALLOPURINOL 100 MG: 100 TABLET ORAL at 08:25

## 2019-02-27 RX ADMIN — ENOXAPARIN SODIUM 30 MG: 30 INJECTION SUBCUTANEOUS at 08:25

## 2019-02-27 RX ADMIN — INSULIN LISPRO 5 UNITS: 100 INJECTION, SOLUTION INTRAVENOUS; SUBCUTANEOUS at 16:28

## 2019-02-27 RX ADMIN — ATORVASTATIN CALCIUM 10 MG: 10 TABLET, FILM COATED ORAL at 08:25

## 2019-02-28 LAB
ANION GAP SERPL CALCULATED.3IONS-SCNC: 8 MMOL/L (ref 3–11)
ANION GAP SERPL CALCULATED.3IONS-SCNC: 9 MMOL/L (ref 3–11)
BASOPHILS # BLD AUTO: 0.02 10*3/MM3 (ref 0–0.2)
BASOPHILS NFR BLD AUTO: 0.2 % (ref 0–1)
BILIRUB UR QL STRIP: NEGATIVE
BNP SERPL-MCNC: 546 PG/ML (ref 0–100)
BUN BLD-MCNC: 37 MG/DL (ref 9–23)
BUN BLD-MCNC: 46 MG/DL (ref 9–23)
BUN/CREAT SERPL: 24 (ref 7–25)
BUN/CREAT SERPL: 24.7 (ref 7–25)
CALCIUM SPEC-SCNC: 8.2 MG/DL (ref 8.7–10.4)
CALCIUM SPEC-SCNC: 8.3 MG/DL (ref 8.7–10.4)
CHLORIDE SERPL-SCNC: 94 MMOL/L (ref 99–109)
CHLORIDE SERPL-SCNC: 97 MMOL/L (ref 99–109)
CLARITY UR: CLEAR
CO2 SERPL-SCNC: 24 MMOL/L (ref 20–31)
CO2 SERPL-SCNC: 26 MMOL/L (ref 20–31)
COLOR UR: YELLOW
CREAT BLD-MCNC: 1.5 MG/DL (ref 0.6–1.3)
CREAT BLD-MCNC: 1.92 MG/DL (ref 0.6–1.3)
DEPRECATED RDW RBC AUTO: 53.2 FL (ref 37–54)
EOSINOPHIL # BLD AUTO: 0.11 10*3/MM3 (ref 0–0.3)
EOSINOPHIL NFR BLD AUTO: 1.3 % (ref 0–3)
ERYTHROCYTE [DISTWIDTH] IN BLOOD BY AUTOMATED COUNT: 15.1 % (ref 11.3–14.5)
GFR SERPL CREATININE-BSD FRML MDRD: 34 ML/MIN/1.73
GFR SERPL CREATININE-BSD FRML MDRD: 45 ML/MIN/1.73
GLUCOSE BLD-MCNC: 186 MG/DL (ref 70–100)
GLUCOSE BLD-MCNC: 256 MG/DL (ref 70–100)
GLUCOSE BLDC GLUCOMTR-MCNC: 188 MG/DL (ref 70–130)
GLUCOSE BLDC GLUCOMTR-MCNC: 190 MG/DL (ref 70–130)
GLUCOSE BLDC GLUCOMTR-MCNC: 192 MG/DL (ref 70–130)
GLUCOSE BLDC GLUCOMTR-MCNC: 229 MG/DL (ref 70–130)
GLUCOSE BLDC GLUCOMTR-MCNC: 283 MG/DL (ref 70–130)
GLUCOSE UR STRIP-MCNC: NEGATIVE MG/DL
HCT VFR BLD AUTO: 28.6 % (ref 38.9–50.9)
HGB BLD-MCNC: 9.3 G/DL (ref 13.1–17.5)
HGB UR QL STRIP.AUTO: NEGATIVE
IMM GRANULOCYTES # BLD AUTO: 0.09 10*3/MM3 (ref 0–0.05)
IMM GRANULOCYTES NFR BLD AUTO: 1.1 % (ref 0–0.6)
KETONES UR QL STRIP: NEGATIVE
LEUKOCYTE ESTERASE UR QL STRIP.AUTO: NEGATIVE
LYMPHOCYTES # BLD AUTO: 1.28 10*3/MM3 (ref 0.6–4.8)
LYMPHOCYTES NFR BLD AUTO: 15.1 % (ref 24–44)
MAGNESIUM SERPL-MCNC: 2.2 MG/DL (ref 1.3–2.7)
MCH RBC QN AUTO: 31.3 PG (ref 27–31)
MCHC RBC AUTO-ENTMCNC: 32.5 G/DL (ref 32–36)
MCV RBC AUTO: 96.3 FL (ref 80–99)
MONOCYTES # BLD AUTO: 0.78 10*3/MM3 (ref 0–1)
MONOCYTES NFR BLD AUTO: 9.2 % (ref 0–12)
NEUTROPHILS # BLD AUTO: 6.26 10*3/MM3 (ref 1.5–8.3)
NEUTROPHILS NFR BLD AUTO: 74.2 % (ref 41–71)
NITRITE UR QL STRIP: NEGATIVE
PH UR STRIP.AUTO: <=5 [PH] (ref 5–8)
PLATELET # BLD AUTO: 244 10*3/MM3 (ref 150–450)
PMV BLD AUTO: 9.2 FL (ref 6–12)
POTASSIUM BLD-SCNC: 3.9 MMOL/L (ref 3.5–5.5)
POTASSIUM BLD-SCNC: 4.5 MMOL/L (ref 3.5–5.5)
PROT UR QL STRIP: NEGATIVE
RBC # BLD AUTO: 2.97 10*6/MM3 (ref 4.2–5.76)
SODIUM BLD-SCNC: 128 MMOL/L (ref 132–146)
SODIUM BLD-SCNC: 130 MMOL/L (ref 132–146)
SP GR UR STRIP: 1.02 (ref 1–1.03)
UROBILINOGEN UR QL STRIP: NORMAL
WBC NRBC COR # BLD: 8.45 10*3/MM3 (ref 3.5–10.8)

## 2019-02-28 PROCEDURE — 83735 ASSAY OF MAGNESIUM: CPT | Performed by: INTERNAL MEDICINE

## 2019-02-28 PROCEDURE — 81003 URINALYSIS AUTO W/O SCOPE: CPT | Performed by: INTERNAL MEDICINE

## 2019-02-28 PROCEDURE — 99232 SBSQ HOSP IP/OBS MODERATE 35: CPT | Performed by: INTERNAL MEDICINE

## 2019-02-28 PROCEDURE — 63710000001 INSULIN LISPRO (HUMAN) PER 5 UNITS: Performed by: INTERNAL MEDICINE

## 2019-02-28 PROCEDURE — 97530 THERAPEUTIC ACTIVITIES: CPT

## 2019-02-28 PROCEDURE — 80048 BASIC METABOLIC PNL TOTAL CA: CPT | Performed by: INTERNAL MEDICINE

## 2019-02-28 PROCEDURE — 99232 SBSQ HOSP IP/OBS MODERATE 35: CPT | Performed by: NURSE PRACTITIONER

## 2019-02-28 PROCEDURE — 25010000002 ENOXAPARIN PER 10 MG: Performed by: ORTHOPAEDIC SURGERY

## 2019-02-28 PROCEDURE — 63710000001 INSULIN DETEMIR PER 5 UNITS: Performed by: INTERNAL MEDICINE

## 2019-02-28 PROCEDURE — 85025 COMPLETE CBC W/AUTO DIFF WBC: CPT | Performed by: INTERNAL MEDICINE

## 2019-02-28 PROCEDURE — 82962 GLUCOSE BLOOD TEST: CPT

## 2019-02-28 PROCEDURE — 83880 ASSAY OF NATRIURETIC PEPTIDE: CPT | Performed by: INTERNAL MEDICINE

## 2019-02-28 PROCEDURE — 94799 UNLISTED PULMONARY SVC/PX: CPT

## 2019-02-28 PROCEDURE — 97168 OT RE-EVAL EST PLAN CARE: CPT | Performed by: OCCUPATIONAL THERAPIST

## 2019-02-28 PROCEDURE — 97535 SELF CARE MNGMENT TRAINING: CPT | Performed by: OCCUPATIONAL THERAPIST

## 2019-02-28 PROCEDURE — 25010000002 ONDANSETRON PER 1 MG: Performed by: INTERNAL MEDICINE

## 2019-02-28 PROCEDURE — 99024 POSTOP FOLLOW-UP VISIT: CPT | Performed by: ORTHOPAEDIC SURGERY

## 2019-02-28 RX ORDER — NITROGLYCERIN 0.4 MG/1
0.4 TABLET SUBLINGUAL
Qty: 25 TABLET | Refills: 0 | Status: SHIPPED | OUTPATIENT
Start: 2019-02-28

## 2019-02-28 RX ORDER — LOSARTAN POTASSIUM 25 MG/1
25 TABLET ORAL
Qty: 90 TABLET | Refills: 0 | Status: SHIPPED | OUTPATIENT
Start: 2019-02-28 | End: 2019-04-09

## 2019-02-28 RX ORDER — LOSARTAN POTASSIUM 25 MG/1
25 TABLET ORAL
Status: DISCONTINUED | OUTPATIENT
Start: 2019-03-01 | End: 2019-03-04 | Stop reason: HOSPADM

## 2019-02-28 RX ORDER — METOPROLOL SUCCINATE 100 MG/1
100 TABLET, EXTENDED RELEASE ORAL DAILY
Qty: 90 TABLET | Refills: 0 | Status: SHIPPED | OUTPATIENT
Start: 2019-02-28 | End: 2019-11-09 | Stop reason: HOSPADM

## 2019-02-28 RX ADMIN — INSULIN LISPRO 2 UNITS: 100 INJECTION, SOLUTION INTRAVENOUS; SUBCUTANEOUS at 09:04

## 2019-02-28 RX ADMIN — SODIUM CHLORIDE, PRESERVATIVE FREE 3 ML: 5 INJECTION INTRAVENOUS at 09:08

## 2019-02-28 RX ADMIN — SODIUM CHLORIDE, PRESERVATIVE FREE 3 ML: 5 INJECTION INTRAVENOUS at 20:58

## 2019-02-28 RX ADMIN — POLYETHYLENE GLYCOL 3350 17 G: 17 POWDER, FOR SOLUTION ORAL at 11:15

## 2019-02-28 RX ADMIN — DOCUSATE SODIUM 100 MG: 100 CAPSULE, LIQUID FILLED ORAL at 09:06

## 2019-02-28 RX ADMIN — INSULIN LISPRO 4 UNITS: 100 INJECTION, SOLUTION INTRAVENOUS; SUBCUTANEOUS at 13:09

## 2019-02-28 RX ADMIN — LISINOPRIL 10 MG: 10 TABLET ORAL at 09:06

## 2019-02-28 RX ADMIN — IPRATROPIUM BROMIDE AND ALBUTEROL SULFATE 3 ML: 2.5; .5 SOLUTION RESPIRATORY (INHALATION) at 19:39

## 2019-02-28 RX ADMIN — IPRATROPIUM BROMIDE AND ALBUTEROL SULFATE 3 ML: 2.5; .5 SOLUTION RESPIRATORY (INHALATION) at 17:12

## 2019-02-28 RX ADMIN — ASPIRIN 81 MG: 81 TABLET, COATED ORAL at 09:06

## 2019-02-28 RX ADMIN — HYDROCODONE BITARTRATE AND ACETAMINOPHEN 1 TABLET: 5; 325 TABLET ORAL at 06:01

## 2019-02-28 RX ADMIN — HYDROCODONE BITARTRATE AND ACETAMINOPHEN 1 TABLET: 5; 325 TABLET ORAL at 12:18

## 2019-02-28 RX ADMIN — INSULIN DETEMIR 18 UNITS: 100 INJECTION, SOLUTION SUBCUTANEOUS at 21:05

## 2019-02-28 RX ADMIN — ISOSORBIDE DINITRATE: 20 TABLET ORAL at 06:01

## 2019-02-28 RX ADMIN — SODIUM CHLORIDE 250 ML: 9 INJECTION, SOLUTION INTRAVENOUS at 20:57

## 2019-02-28 RX ADMIN — INSULIN LISPRO 6 UNITS: 100 INJECTION, SOLUTION INTRAVENOUS; SUBCUTANEOUS at 18:18

## 2019-02-28 RX ADMIN — CYANOCOBALAMIN TAB 1000 MCG 1000 MCG: 1000 TAB at 09:06

## 2019-02-28 RX ADMIN — INSULIN LISPRO 5 UNITS: 100 INJECTION, SOLUTION INTRAVENOUS; SUBCUTANEOUS at 09:03

## 2019-02-28 RX ADMIN — ATORVASTATIN CALCIUM 10 MG: 10 TABLET, FILM COATED ORAL at 09:07

## 2019-02-28 RX ADMIN — INSULIN LISPRO 2 UNITS: 100 INJECTION, SOLUTION INTRAVENOUS; SUBCUTANEOUS at 21:05

## 2019-02-28 RX ADMIN — ONDANSETRON 4 MG: 2 INJECTION INTRAMUSCULAR; INTRAVENOUS at 07:04

## 2019-02-28 RX ADMIN — ISOSORBIDE DINITRATE: 20 TABLET ORAL at 14:42

## 2019-02-28 RX ADMIN — DOCUSATE SODIUM 100 MG: 100 CAPSULE, LIQUID FILLED ORAL at 09:07

## 2019-02-28 RX ADMIN — CITALOPRAM HYDROBROMIDE 10 MG: 20 TABLET ORAL at 09:06

## 2019-02-28 RX ADMIN — FLUTICASONE PROPIONATE 2 SPRAY: 50 SPRAY, METERED NASAL at 09:06

## 2019-02-28 RX ADMIN — INSULIN LISPRO 5 UNITS: 100 INJECTION, SOLUTION INTRAVENOUS; SUBCUTANEOUS at 15:10

## 2019-02-28 RX ADMIN — ALLOPURINOL 100 MG: 100 TABLET ORAL at 09:07

## 2019-02-28 RX ADMIN — ENOXAPARIN SODIUM 30 MG: 30 INJECTION SUBCUTANEOUS at 09:05

## 2019-02-28 RX ADMIN — METOPROLOL SUCCINATE 100 MG: 100 TABLET, FILM COATED, EXTENDED RELEASE ORAL at 09:06

## 2019-03-01 LAB
ANION GAP SERPL CALCULATED.3IONS-SCNC: 7 MMOL/L (ref 3–11)
BUN BLD-MCNC: 48 MG/DL (ref 9–23)
BUN/CREAT SERPL: 26.7 (ref 7–25)
CALCIUM SPEC-SCNC: 8.1 MG/DL (ref 8.7–10.4)
CHLORIDE SERPL-SCNC: 97 MMOL/L (ref 99–109)
CO2 SERPL-SCNC: 24 MMOL/L (ref 20–31)
CREAT BLD-MCNC: 1.8 MG/DL (ref 0.6–1.3)
GFR SERPL CREATININE-BSD FRML MDRD: 36 ML/MIN/1.73
GLUCOSE BLD-MCNC: 110 MG/DL (ref 70–100)
GLUCOSE BLDC GLUCOMTR-MCNC: 113 MG/DL (ref 70–130)
GLUCOSE BLDC GLUCOMTR-MCNC: 197 MG/DL (ref 70–130)
GLUCOSE BLDC GLUCOMTR-MCNC: 207 MG/DL (ref 70–130)
GLUCOSE BLDC GLUCOMTR-MCNC: 268 MG/DL (ref 70–130)
OSMOLALITY SERPL: 286 MOSM/KG (ref 275–295)
POTASSIUM BLD-SCNC: 4.1 MMOL/L (ref 3.5–5.5)
SODIUM BLD-SCNC: 128 MMOL/L (ref 132–146)

## 2019-03-01 PROCEDURE — 97110 THERAPEUTIC EXERCISES: CPT

## 2019-03-01 PROCEDURE — 80048 BASIC METABOLIC PNL TOTAL CA: CPT | Performed by: INTERNAL MEDICINE

## 2019-03-01 PROCEDURE — 83930 ASSAY OF BLOOD OSMOLALITY: CPT | Performed by: INTERNAL MEDICINE

## 2019-03-01 PROCEDURE — 94799 UNLISTED PULMONARY SVC/PX: CPT

## 2019-03-01 PROCEDURE — 99232 SBSQ HOSP IP/OBS MODERATE 35: CPT | Performed by: INTERNAL MEDICINE

## 2019-03-01 PROCEDURE — 97530 THERAPEUTIC ACTIVITIES: CPT

## 2019-03-01 PROCEDURE — 82962 GLUCOSE BLOOD TEST: CPT

## 2019-03-01 PROCEDURE — 63710000001 INSULIN DETEMIR PER 5 UNITS: Performed by: INTERNAL MEDICINE

## 2019-03-01 PROCEDURE — 99024 POSTOP FOLLOW-UP VISIT: CPT | Performed by: ORTHOPAEDIC SURGERY

## 2019-03-01 PROCEDURE — 25010000002 ENOXAPARIN PER 10 MG: Performed by: ORTHOPAEDIC SURGERY

## 2019-03-01 RX ORDER — BUDESONIDE AND FORMOTEROL FUMARATE DIHYDRATE 80; 4.5 UG/1; UG/1
2 AEROSOL RESPIRATORY (INHALATION)
Status: DISCONTINUED | OUTPATIENT
Start: 2019-03-01 | End: 2019-03-04 | Stop reason: HOSPADM

## 2019-03-01 RX ADMIN — FLUTICASONE PROPIONATE 2 SPRAY: 50 SPRAY, METERED NASAL at 09:26

## 2019-03-01 RX ADMIN — SODIUM CHLORIDE, PRESERVATIVE FREE 3 ML: 5 INJECTION INTRAVENOUS at 20:39

## 2019-03-01 RX ADMIN — ATORVASTATIN CALCIUM 10 MG: 10 TABLET, FILM COATED ORAL at 09:27

## 2019-03-01 RX ADMIN — IPRATROPIUM BROMIDE AND ALBUTEROL SULFATE 3 ML: 2.5; .5 SOLUTION RESPIRATORY (INHALATION) at 20:25

## 2019-03-01 RX ADMIN — METOPROLOL SUCCINATE 100 MG: 100 TABLET, FILM COATED, EXTENDED RELEASE ORAL at 09:27

## 2019-03-01 RX ADMIN — DOCUSATE SODIUM 100 MG: 100 CAPSULE, LIQUID FILLED ORAL at 13:30

## 2019-03-01 RX ADMIN — CYANOCOBALAMIN TAB 1000 MCG 1000 MCG: 1000 TAB at 09:27

## 2019-03-01 RX ADMIN — INSULIN LISPRO 2 UNITS: 100 INJECTION, SOLUTION INTRAVENOUS; SUBCUTANEOUS at 20:38

## 2019-03-01 RX ADMIN — HYDROCODONE BITARTRATE AND ACETAMINOPHEN 1 TABLET: 5; 325 TABLET ORAL at 18:30

## 2019-03-01 RX ADMIN — LOSARTAN POTASSIUM 25 MG: 25 TABLET, FILM COATED ORAL at 09:28

## 2019-03-01 RX ADMIN — IPRATROPIUM BROMIDE AND ALBUTEROL SULFATE 3 ML: 2.5; .5 SOLUTION RESPIRATORY (INHALATION) at 16:13

## 2019-03-01 RX ADMIN — INSULIN LISPRO 4 UNITS: 100 INJECTION, SOLUTION INTRAVENOUS; SUBCUTANEOUS at 18:30

## 2019-03-01 RX ADMIN — ALLOPURINOL 100 MG: 100 TABLET ORAL at 09:27

## 2019-03-01 RX ADMIN — CITALOPRAM HYDROBROMIDE 10 MG: 20 TABLET ORAL at 09:28

## 2019-03-01 RX ADMIN — SODIUM CHLORIDE, PRESERVATIVE FREE 3 ML: 5 INJECTION INTRAVENOUS at 09:00

## 2019-03-01 RX ADMIN — ASPIRIN 81 MG: 81 TABLET, COATED ORAL at 09:27

## 2019-03-01 RX ADMIN — POLYETHYLENE GLYCOL 3350 17 G: 17 POWDER, FOR SOLUTION ORAL at 13:30

## 2019-03-01 RX ADMIN — ENOXAPARIN SODIUM 30 MG: 30 INJECTION SUBCUTANEOUS at 09:32

## 2019-03-01 RX ADMIN — INSULIN LISPRO 6 UNITS: 100 INJECTION, SOLUTION INTRAVENOUS; SUBCUTANEOUS at 13:30

## 2019-03-01 RX ADMIN — INSULIN DETEMIR 18 UNITS: 100 INJECTION, SOLUTION SUBCUTANEOUS at 20:39

## 2019-03-01 RX ADMIN — DOCUSATE SODIUM 100 MG: 100 CAPSULE, LIQUID FILLED ORAL at 09:28

## 2019-03-01 RX ADMIN — BUDESONIDE AND FORMOTEROL FUMARATE DIHYDRATE 2 PUFF: 80; 4.5 AEROSOL RESPIRATORY (INHALATION) at 20:25

## 2019-03-01 RX ADMIN — IPRATROPIUM BROMIDE AND ALBUTEROL SULFATE 3 ML: 2.5; .5 SOLUTION RESPIRATORY (INHALATION) at 08:37

## 2019-03-01 RX ADMIN — IPRATROPIUM BROMIDE AND ALBUTEROL SULFATE 3 ML: 2.5; .5 SOLUTION RESPIRATORY (INHALATION) at 13:41

## 2019-03-01 NOTE — PROGRESS NOTES
[facility] Medicine Services  PROGRESS NOTE    Patient Name: Lico Diego  : 1935  MRN: [id number]    Date of Admission: 2019  Length of Stay: 7  Primary Care Physician: Khai Menjivar DO    Subjective   Subjective     CC:  Ankle fracture     HPI:  Much more alert today. States his breathing is a bit improved. Did better with PT today. Reviewed kidney function with patient and daughter. Had improved UOP this AM.     Review of Systems  Gen- No fevers, chills  CV- No chest pain, palpitations  Resp- No cough, dyspnea  GI- No N/V/D, abd pain    Otherwise ROS is negative except as mentioned in the HPI.    Objective   Objective     Vital Signs:   Temp:  [97.7 °F (36.5 °C)-99.7 °F (37.6 °C)] 99.1 °F (37.3 °C)  Heart Rate:  [73-92] 73  Resp:  [16-24] 16  BP: (111-143)/(53-86) 138/53        Physical Exam:  Constitutional: No acute distress, awake, alert; much more alert today  HENT: NCAT, mucous membranes moist  Respiratory: Clear to auscultation bilaterally, respiratory effort normal; coarse breath sounds in bases   Cardiovascular: RRR, II/VI murmurs, no rubs, or gallops, palpable pedal pulses bilaterally  Gastrointestinal: Positive bowel sounds, soft, nontender, nondistended  Musculoskeletal: No bilateral ankle edema; splint left foot   Psychiatric: Appropriate affect, cooperative  Neurologic: Oriented x 3, strength symmetric in all extremities, Cranial Nerves grossly intact to confrontation, speech clear  Skin: No rashes    Results Reviewed:  I have personally reviewed current lab, radiology, and data and agree.    Results from last 7 days   Lab Units 19  0535 19  0337 19  0014  19  0648   WBC 10*3/mm3 8.45 7.00 7.02   < > 5.24   HEMOGLOBIN g/dL 9.3* 9.2* 9.7*   < > 9.9*   HEMATOCRIT % 28.6* 28.5* 30.2*   < > 31.9*   PLATELETS 10*3/mm3 244 229 183   < > 157   INR   --   --   --   --  1.22*    < > = values in this interval not displayed.     Results  from last 7 days   Lab Units 03/01/19  0600 02/28/19  1613 02/28/19  0535 02/27/19  0337 02/26/19  0951 02/26/19  0424 02/26/19  0016   SODIUM mmol/L 128* 128* 130* 132  --  132 133   POTASSIUM mmol/L 4.1 4.5 3.9 4.1  --  4.2 4.3   CHLORIDE mmol/L 97* 94* 97* 99  --  103 101   CO2 mmol/L 24.0 26.0 24.0 24.0  --  22.0 24.0   BUN mg/dL 48* 46* 37* 40*  --  41* 44*   CREATININE mg/dL 1.80* 1.92* 1.50* 1.59*  --  1.60* 1.69*   GLUCOSE mg/dL 110* 256* 186* 173*  --  159* 167*   CALCIUM mg/dL 8.1* 8.2* 8.3* 8.6*  --  8.5* 8.6*   ALT (SGPT) U/L  --   --   --  14  --   --  18   AST (SGOT) U/L  --   --   --  24  --   --  28   TROPONIN I ng/mL  --   --   --   --  0.521* 0.631* 0.947*     Estimated Creatinine Clearance: 41.5 mL/min (A) (by C-G formula based on SCr of 1.8 mg/dL (H)).    BNP   Date Value Ref Range Status   02/28/2019 546.0 (H) 0.0 - 100.0 pg/mL Final     Comment:     Results may be falsely decreased if patient taking Biotin.       Microbiology Results Abnormal     None          Imaging Results (last 24 hours)     ** No results found for the last 24 hours. **          Results for orders placed during the hospital encounter of 02/22/19   Adult Transthoracic Echo Complete W/ Cont if Necessary Per Protocol    Narrative · Left ventricular systolic function is low normal. Estimated EF = 45%.   Difficult to accurately assess regional wall motion abnormality and EF due   to frequent ventricular ectopy.  · Left ventricular diastolic dysfunction (grade I) consistent with   impaired relaxation.  · Left atrial cavity size is mildly dilated.  · Mild mitral valve regurgitation is present  · Moderate tricuspid valve regurgitation is present.  · Estimated right ventricular systolic pressure from tricuspid   regurgitation is markedly elevated (64 mmHg).          I have reviewed the medications:    Current Facility-Administered Medications:   •  acetaminophen (TYLENOL) tablet 650 mg, 650 mg, Oral, Q4H PRN, Obi Anglin  MD Eduardo  •  albuterol (PROVENTIL) nebulizer solution 0.083% 2.5 mg/3mL, 2.5 mg, Nebulization, Q6H PRN, Obi Agnlin MD, 2.5 mg at 02/27/19 0410  •  allopurinol (ZYLOPRIM) tablet 100 mg, 100 mg, Oral, Daily, Obi Anglin MD, 100 mg at 03/01/19 0927  •  aspirin EC tablet 81 mg, 81 mg, Oral, Daily, Obi Anglin MD, 81 mg at 03/01/19 0927  •  atorvastatin (LIPITOR) tablet 10 mg, 10 mg, Oral, Daily, Obi Anglin MD, 10 mg at 03/01/19 0927  •  bisacodyl (DULCOLAX) suppository 10 mg, 10 mg, Rectal, Daily PRN, Rajani Diamond, APRN  •  citalopram (CeleXA) tablet 10 mg, 10 mg, Oral, Daily, Obi Anglin MD, 10 mg at 03/01/19 0928  •  docusate sodium (COLACE) capsule 100 mg, 100 mg, Oral, BID PRN, Obi Anglin MD, 100 mg at 02/28/19 0907  •  docusate sodium (COLACE) capsule 100 mg, 100 mg, Oral, Daily, Rajani Diamond, APRN, 100 mg at 03/01/19 0928  •  enoxaparin (LOVENOX) syringe 30 mg, 30 mg, Subcutaneous, Daily, Ken Abreu MD, 30 mg at 03/01/19 0932  •  fluticasone (FLONASE) 50 MCG/ACT nasal spray 2 spray, 2 spray, Each Nare, Daily, Julio Saldaña MD, 2 spray at 03/01/19 0926  •  HYDROcodone-acetaminophen (NORCO) 5-325 MG per tablet 1 tablet, 1 tablet, Oral, Q6H PRN, Flip Brown MD, 1 tablet at 02/28/19 1218  •  insulin detemir (LEVEMIR) injection 18 Units, 18 Units, Subcutaneous, Nightly, Lisset Vigil DO, 18 Units at 02/28/19 2105  •  insulin lispro (humaLOG) injection 0-9 Units, 0-9 Units, Subcutaneous, 4x Daily With Meals & Nightly, Obi Anglin MD, 2 Units at 02/28/19 2105  •  insulin lispro (humaLOG) injection 7 Units, 7 Units, Subcutaneous, TID With Meals, Lisset Vigil DO, 7 Units at 03/01/19 0925  •  ipratropium-albuterol (DUO-NEB) nebulizer solution 3 mL, 3 mL, Nebulization, 4x Daily - RT, Tyshawn Nielson MD, 3 mL at 03/01/19 1341  •  losartan (COZAAR) tablet 25 mg, 25 mg, Oral, Q24H,  Sanjay Govea IV, MD, 25 mg at 03/01/19 0928  •  magnesium sulfate 4g/100mL (PREMIX) infusion, 4 g, Intravenous, PRN, Flip Brown MD, 4 g at 02/26/19 1513  •  melatonin sublingual tablet 5 mg, 5 mg, Sublingual, Nightly PRN, Obi Anglin MD  •  metoprolol succinate XL (TOPROL-XL) 24 hr tablet 100 mg, 100 mg, Oral, Daily, Sanjay Govea IV, MD, 100 mg at 03/01/19 0927  •  Morphine sulfate (PF) injection 1 mg, 1 mg, Intravenous, Q4H PRN **AND** [DISCONTINUED] naloxone (NARCAN) injection 0.4 mg, 0.4 mg, Intravenous, Q5 Min PRN, Obi Anglin MD  •  nitroglycerin (NITROSTAT) SL tablet 0.4 mg, 0.4 mg, Sublingual, Q5 Min PRN, Julio Saldaña MD, 0.4 mg at 02/25/19 0758  •  [DISCONTINUED] ondansetron (ZOFRAN) tablet 4 mg, 4 mg, Oral, Q6H PRN **OR** ondansetron (ZOFRAN) injection 4 mg, 4 mg, Intravenous, Q6H PRN, Obi Anglin MD, 4 mg at 02/28/19 0704  •  Pharmacy Consult - [facility], , Does not apply, Daily, Yancy Stovall, [facility], Stopped at 02/28/19 1013  •  Pharmacy Meds to Bed Consult, , Does not apply, Daily, Sanjay Govea IV, MD, Stopped at 03/01/19 0125  •  polyethylene glycol 3350 powder (packet), 17 g, Oral, BID PRN, Lisset Vigil, , 17 g at 02/28/19 1115  •  sodium chloride 0.9 % flush 10 mL, 10 mL, Intravenous, PRN, Kyle Mendoza MD  •  sodium chloride 0.9 % flush 3 mL, 3 mL, Intravenous, Q12H, Obi Anglin MD, 3 mL at 02/28/19 2058  •  sodium chloride 0.9 % flush 3 mL, 3 mL, Intravenous, Q12H, Ken Abreu MD, 3 mL at 02/28/19 2058  •  sodium chloride 0.9 % flush 3-10 mL, 3-10 mL, Intravenous, PRN, Ken Abreu MD  •  vitamin B-12 (CYANOCOBALAMIN) tablet 1,000 mcg, 1,000 mcg, Oral, Daily, Obi Anglin MD, 1,000 mcg at 03/01/19 0968      Assessment/Plan   Assessment / Plan     Active Hospital Problems    Diagnosis Date Noted   • **Bimalleolar ankle fracture, right, closed, initial encounter  [S82.841A] 02/22/2019   • Premature ventricular contractions (PVCs) (VPCs) [I49.3] 02/27/2019   • JC (obstructive sleep apnea) [G47.33] 02/26/2019     cpap     • Acute respiratory failure with hypoxia (CMS/[facility]) [J96.01] 02/25/2019   • Coronary artery disease involving native coronary artery of native heart with angina pectoris (CMS/[facility]) [I25.119] 02/25/2019     · CABG (1998): LIMA to LAD, SVG to circumflex, SVG to RPDA, SVG to diagonal. Normal LVEF  · Cardiac cath (2003): 3/4 grafts patent. Normal LVEF.  Medical management  · PET stress (2013): Inferior infarct with ischemia. Medical management recommended     • Demand ischemia (CMS/[facility]) [I24.8] 02/25/2019   • CKD (chronic kidney disease) stage 3, GFR 30-59 ml/min (CMS/[facility]) [N18.3] 02/22/2019   • Ischemic cardiomyopathy [I25.5] 02/22/2019   • Acute on chronic systolic congestive heart failure (CMS/[facility]) [I50.23] 02/22/2019     · Echo (2/26/2019): LVEF 45%.  No significant valvular abnormality.  RVSP 64 mmHg     • Severe obesity (BMI 35.0-35.9 with comorbidity) (CMS/[facility]) [E66.01, Z68.35] 02/22/2019   • Type 2 diabetes mellitus with diabetic polyneuropathy, with long-term current use of insulin (CMS/[facility]) [E11.42, Z79.4] 02/22/2019   • COPD (chronic obstructive pulmonary disease) (CMS/[facility]) [J44.9] 02/22/2019   • Essential hypertension [I10] 02/22/2019          Brief Hospital Course to date:  Ms. Diego is a 83 year old man with history of COPD, CAD s/p CABG, chronic systolic heart failure who was admitted 2/22 with closed right ankle fracture. Found to have AKILA on CKD. Resp status decompensated after ORIF 2/23 and he was placed on BiPAP. Found to have elevated BNP and trop likely demand related. He was admitted to the ICU and was diuresed. Cardiology and nephrology consulted. Patient transferred to medicine service 2/28.      Bimalleolar ankle fracutre   - s/p repain 2/23   - Ortho following; post op care per ortho   - Follow up 3/12      Acute resp failure with  hypoxia   - improving and around baseline per family  - Continue intermittent diuresis; may be able to resume PO lasix tomorrow   - Continue duo-nebs      Acute on chronic systolic heart failure   Ischemic cardiomyopathy   - Cards evaluated; intermittent diuresis   - EF 45%   - Monitor I/Os; daily weights   - continue losartan, metoprolol   - consider noninvasive ischemic eval - can be done as outpatient   - Follow up with Dr. Govea in 4 weeks     AKILA on CKD   - Neph following  - ACE started; monitor with diuresis   - Increase in Cr yesterday and received some fluid. Held on diuresis yesterday and today     COPD   - Scheduled duo-nebs   - Start symbicort      Demand ischemia   - continue ASA, statin, metoprolol     HTN   - isordil and hydralazine  - lisinopril changed to losartan and metoprolol resumed     DM    - continue to adjust as needed      Obesity   - Complicates all aspects of care      Dispo; likely rehab in 2-3 days pending renal function and fluid balance       CODE STATUS:   Code Status and Medical Interventions:   Ordered at: 02/26/19 1233     Level Of Support Discussed With:    Health Care Surrogate     Code Status:    CPR     Medical Interventions (Level of Support Prior to Arrest):    Full     Comments:    per daughter and living will.  Please verify above with patient and daughter.         Electronically signed by Lisset Vigil DO, 03/01/19, 2:50 PM.

## 2019-03-01 NOTE — PLAN OF CARE
Problem: Patient Care Overview  Goal: Plan of Care Review  Outcome: Ongoing (interventions implemented as appropriate)   03/01/19 5844   Coping/Psychosocial   Plan of Care Reviewed With patient   Plan of Care Review   Progress improving   OTHER   Outcome Summary Pt t/f with MOD A x2 for sit to stand with RWx, then stand step t/f to chair with MIN A x2 using RWx. Pt then performs marching with R lift off only to prevent further weight bearing. Pt participated in ther ex well and did much better with bed mobility.

## 2019-03-01 NOTE — PROGRESS NOTES
"          Orthopaedic Surgery Progress Note      LOS: 7 days   Patient Care Team:  Khai Menjivar DO as PCP - General (Family Medicine)  Ti Santiago MD as Consulting Physician (Interventional Cardiology)    POD 6    Subjective     Interval History:   In the interim, patient has had elevation of his serum creatinine.  He has had some gentle hydration.  He has been seen by cardiology and nephrology. Denies pain in the right ankle.  He did better with his transfers this morning.  His 3 children are at the bedside this afternoon.    Objective     Vital Signs:  Temp (24hrs), Av.6 °F (37 °C), Min:97.7 °F (36.5 °C), Max:99.7 °F (37.6 °C)    /53   Pulse 92   Temp 99.1 °F (37.3 °C) (Oral)   Resp 16   Ht 180.3 cm (71\")   Wt 119 kg (261 lb 14.4 oz)   SpO2 93%   BMI 36.53 kg/m²      Labs:  Lab Results (last 24 hours)     Procedure Component Value Units Date/Time    POC Glucose Once [[id number]]  (Abnormal) Collected:  19 1144    Specimen:  Blood Updated:  19 1146     Glucose 268 mg/dL     POC Glucose Once [[id number]]  (Normal) Collected:  19 0750    Specimen:  Blood Updated:  19 0752     Glucose 113 mg/dL     Basic Metabolic Panel [[id number]]  (Abnormal) Collected:  19 0600    Specimen:  Blood Updated:  19 0738     Glucose 110 mg/dL      BUN 48 mg/dL      Creatinine 1.80 mg/dL      Sodium 128 mmol/L      Potassium 4.1 mmol/L      Chloride 97 mmol/L      CO2 24.0 mmol/L      Calcium 8.1 mg/dL      eGFR Non African Amer 36 mL/min/1.73      BUN/Creatinine Ratio 26.7     Anion Gap 7.0 mmol/L     Narrative:       National Kid[employer] Guidelines    Stage     Description        GFR  1         Normal or High     90+  2         Mild decrease      60-89  3         Moderate decrease  30-59  4         Severe decrease    15-29  5         Kidney failure     <15    The MDRD GFR formula is only valid for adults with stable renal function between ages 18 and 70.    Osmolality, " Serum [[id number]]  (Normal) Collected:  03/01/19 0600    Specimen:  Blood Updated:  03/01/19 0721     Osmolality 286 mOsm/kg     Urinalysis With Culture If Indicated - Urine, Clean Catch [[id number]]  (Normal) Collected:  02/28/19 2131    Specimen:  Urine, Clean Catch Updated:  02/28/19 2143     Color, UA Yellow     Appearance, UA Clear     pH, UA <=5.0     Specific Gravity, UA 1.020     Glucose, UA Negative     Ketones, UA Negative     Bilirubin, UA Negative     Blood, UA Negative     Protein, UA Negative     Leuk Esterase, UA Negative     Nitrite, UA Negative     Urobilinogen, UA 0.2 E.U./dL    Narrative:       Urine microscopic not indicated.    POC Glucose Once [[id number]]  (Abnormal) Collected:  02/28/19 2040    Specimen:  Blood Updated:  02/28/19 2103     Glucose 192 mg/dL     Basic Metabolic Panel [[id number]]  (Abnormal) Collected:  02/28/19 1613    Specimen:  Blood Updated:  02/28/19 1735     Glucose 256 mg/dL      BUN 46 mg/dL      Creatinine 1.92 mg/dL      Sodium 128 mmol/L      Potassium 4.5 mmol/L      Chloride 94 mmol/L      CO2 26.0 mmol/L      Calcium 8.2 mg/dL      eGFR Non African Amer 34 mL/min/1.73      BUN/Creatinine Ratio 24.0     Anion Gap 8.0 mmol/L     Narrative:       National Kidney Foundation Guidelines    Stage     Description        GFR  1         Normal or High     90+  2         Mild decrease      60-89  3         Moderate decrease  30-59  4         Severe decrease    15-29  5         Kidney failure     <15    The MDRD GFR formula is only valid for adults with stable renal function between ages 18 and 70.    POC Glucose Once [[id number]]  (Abnormal) Collected:  02/28/19 1625    Specimen:  Blood Updated:  02/28/19 1640     Glucose 283 mg/dL           Physical Exam:  Splint is intact  Toes are pink and warm    Assessment/Plan   Postop day #6 status post ORIF right bimalleolar ankle fracture and ORIF of the right syndesmosis.      Report on left elbow x-ray is negative    Patient will  likely be ready for transfer early next week.  Recommend getting his fluid balance and renal function maximize before discharging him.    Continue PT and OT    Continue low-dose Lovenox for DVT prophylaxis    Upon discharge, patient will need follow-up in my PA clinic on 3/12/19 for a wound check, suture removal, and placement into a short leg cast.  Anticipate keeping him casted longer than normal because of the increased risk of Charcot arthropathy due to his diabetic peripheral neuropathy.                Ken Abreu MD  03/01/19  12:53 PM

## 2019-03-01 NOTE — PROGRESS NOTES
Continued Stay Note  [facility]     Patient Name: Lico Diego  MRN: [id number]  Today's Date: 3/1/2019    Admit Date: 2/22/2019    Discharge Plan     Row Name 03/01/19 1350       Plan    Plan  [facility]    Patient/Family in Agreement with Plan  yes    Plan Comments  Discussed patient in am rounds.  Patient likely ready for discharge on Sunday, 3/3.  Updated Ondina with [facility].  She will get back to  when she knows what unit the patient will be going to.  Patient's plan is an acute rehab bed at [facility] on Sunday.  Ambulance scheduled for [place], 3/3 at 1300.  PCS on chartlet.  Updated patient's daughter and nursing.  CM will continue to follow.  Kizzy Ramirez RN [phone]    Final Discharge Disposition Code  62 - inpatient rehab facility        Discharge Codes    No documentation.       Expected Discharge Date and Time     Expected Discharge Date Expected Discharge Time    Mar 2, 2019             Kizzy Ramirez RN

## 2019-03-01 NOTE — PLAN OF CARE
Problem: Patient Care Overview  Goal: Plan of Care Review  Outcome: Ongoing (interventions implemented as appropriate)   02/28/19 1945   Coping/Psychosocial   Plan of Care Reviewed With patient;family   Plan of Care Review   Progress no change   OTHER   Outcome Summary Slept often. Oriented when awake. Abdominal muscle use for respirations and congested cough which family states baseline. 2L cannula.  CPAP with cannula placed for evening. Monitor SR w/frequent PVCs. PRN colace and miralax provided without success. Urine concentrated with low output. Norco given once. Up in recliner this afternoon. Family bedside majority shift.

## 2019-03-01 NOTE — PROGRESS NOTES
Case Management Discharge Note    Final Note: Spoke with Ondina.  Patient has an acute rehab bed on the General Rehab unit at [facility] on Sunday, 3/3.  PAtient will need to complete IV iron therapy prior admission.  Nurse to call report to [phone].  Ambulance scheduled for 1300.  PCS on chartlet.  Please place a copy of the transfer summary in the packet to be sent to the facility with the patient.  Kizzy Ramirez RN [phone]    Destination - Selection Complete      Service Provider Request Status Selected Services Address Phone Number Fax Number    [facility] Selected Inpatient Rehabilitation [address] [address] [phone]      Durable Medical Equipment      No service has been selected for the patient.      Dialysis/Infusion      No service has been selected for the patient.      Home Medical Care      No service has been selected for the patient.      Community Resources      No service has been selected for the patient.        Ambulance: [facility]    Final Discharge Disposition Code: 62 - inpatient rehab facility

## 2019-03-01 NOTE — THERAPY TREATMENT NOTE
Acute Care - Physical Therapy Treatment Note  [facility]     Patient Name: Lico Diego  : 1935  MRN: [id number]  Today's Date: 3/1/2019  Onset of Illness/Injury or Date of Surgery: 19  Date of Referral to PT: 19  Referring Physician: Dr. Duarte     Admit Date: 2019    Visit Dx:    ICD-10-CM ICD-9-CM   1. Fall in home, initial encounter W19.XXXA E888.9    Y92.009 E849.0   2. Hypotension due to hypovolemia I95.89 458.8    E86.1 276.52   3. Volume depletion E86.9 276.50   4. Gastroenteritis K52.9 558.9   5. Acute renal failure superimposed on chronic kidney disease, unspecified CKD stage, unspecified acute renal failure type (CMS/[facility]) N17.9 584.9    N18.9 585.9   6. Closed fracture of right ankle, initial encounter S82.891A 824.8   7. Bimalleolar ankle fracture, right, closed, initial encounter S82.841A 824.4   8. Syndesmotic disruption of right ankle, initial encounter S93.431A 845.03   9. Impaired functional mobility, balance, gait, and endurance Z74.09 V49.89   10. Impaired mobility and ADLs Z74.09 799.89   11. Acute on chronic systolic congestive heart failure (CMS/[facility]) I50.23 428.23     428.0   12. Coronary artery disease involving native coronary artery of native heart with angina pectoris (CMS/HCC) I25.119 414.01     413.9     Patient Active Problem List   Diagnosis   • Fall at home   • CKD (chronic kidney disease) stage 3, GFR 30-59 ml/min (CMS/[facility])   • Diarrhea of presumed infectious origin   • Bilious vomiting with nausea   • Ischemic cardiomyopathy   • Acute on chronic systolic congestive heart failure (CMS/[facility])   • Severe obesity (BMI 35.0-35.9 with comorbidity) (CMS/[facility])   • Type 2 diabetes mellitus with diabetic polyneuropathy, with long-term current use of insulin (CMS/[facility])   • COPD (chronic obstructive pulmonary disease) (CMS/[facility])   • Essential hypertension   • Bimalleolar ankle fracture, right, closed, initial encounter   • Syndesmotic disruption of right ankle   •  Coronary artery disease involving native coronary artery of native heart with angina pectoris (CMS/HCC)   • Acute respiratory failure with hypoxia (CMS/HCC)   • Demand ischemia (CMS/HCC)   • JC (obstructive sleep apnea)   • Premature ventricular contractions (PVCs) (VPCs)       Therapy Treatment    Rehabilitation Treatment Summary     Row Name 03/01/19 0932             Treatment Time/Intention    Discipline  physical therapist  -EJ      Document Type  therapy note (daily note)  -EJ      Subjective Information  complains of;pain;fatigue  -EJ      Mode of Treatment  physical therapy  -EJ      Patient/Family Observations  pt supine, dtr at bedside  -EJ      Therapy Frequency (PT Clinical Impression)  daily  -EJ      Patient Effort  good  -EJ      Comment  desaturation, SOB, coughing with bed mobility.  -EJ      Existing Precautions/Restrictions  fall;non-weight bearing;oxygen therapy device and L/min NWB R foot; WBAT for transfers only  -EJ      Recorded by [EJ] Natasha García, PT 03/01/19 1009      Row Name 03/01/19 0932             Vital Signs    Pre Systolic BP Rehab  -- taken by RN just prior to PT entry, okay'd for tx.  -EJ      Pre SpO2 (%)  93  -EJ      O2 Delivery Pre Treatment  supplemental O2  -EJ      Intra SpO2 (%)  87  -EJ      O2 Delivery Intra Treatment  supplemental O2  -EJ      Post SpO2 (%)  92  -EJ      O2 Delivery Post Treatment  supplemental O2  -EJ      Pre Patient Position  Supine  -EJ      Intra Patient Position  Standing  -EJ      Post Patient Position  Sitting  -EJ      Recorded by [EJ] Natasha García, PT 03/01/19 1009      Row Name 03/01/19 0932             Cognitive Assessment/Intervention- PT/OT    Affect/Mental Status (Cognitive)  WFL  -EJ      Orientation Status (Cognition)  oriented x 4  -EJ      Follows Commands (Cognition)  follows one step commands;WFL  -EJ      Cognitive Function (Cognitive)  safety deficit  -EJ      Safety Deficit (Cognitive)  insight into deficits/self  awareness;safety precautions awareness;safety precautions follow-through/compliance  -EJ      Personal Safety Interventions  fall prevention program maintained;gait belt;nonskid shoes/slippers when out of bed  -EJ      Recorded by [EJ] Natasha García, PT 03/01/19 1009      Row Name 03/01/19 0932             Safety Issues, Functional Mobility    Safety Issues Affecting Function (Mobility)  safety precautions follow-through/compliance;safety precaution awareness;insight into deficits/self awareness  -EJ      Impairments Affecting Function (Mobility)  balance;cognition;coordination;endurance/activity tolerance;pain;shortness of breath;strength  -EJ      Recorded by [EJ] Natasha García, PT 03/01/19 1009      Row Name 03/01/19 0932             Mobility Assessment/Intervention    Extremity Weight-bearing Status  right lower extremity  -EJ      Right Lower Extremity (Weight-bearing Status)  non weight-bearing (NWB)  (Significant)  WBAT for transfers only  -EJ      Recorded by [EJ] Natasha García, PT 03/01/19 1009      Row Name 03/01/19 0932             Bed Mobility Assessment/Treatment    Bed Mobility Assessment/Treatment  supine-sit;scooting/bridging  -EJ      Scooting/Bridging [place] (Bed Mobility)  contact guard;verbal cues  -EJ      Supine-Sit [place] (Bed Mobility)  minimum assist (75% patient effort)  -EJ      Bed Mobility, Safety Issues  decreased use of legs for bridging/pushing  -EJ      Assistive Device (Bed Mobility)  head of bed elevated;draw sheet;bed rails  -EJ      Comment (Bed Mobility)  pt given min A for movement of RLE to EOB, and for trunk up to sitting. Pt cued for LEs to EOB, scooting hips to EOB, then reaching across for trunk up. Pt pulled up to therapist's hand.  -EJ      Recorded by [EJ] Natasha García, PT 03/01/19 1009      Row Name 03/01/19 0932             Transfer Assessment/Treatment    Transfer Assessment/Treatment  sit-stand transfer;stand-sit transfer;bed-chair  transfer  -EJ      Recorded by [EJ] Natasha García, PT 03/01/19 1009      Row Name 03/01/19 0932             Bed-Chair Transfer    Bed-Chair [place] (Transfers)  2 person assist;verbal cues;minimum assist (75% patient effort)  -EJ      Assistive Device (Bed-Chair Transfers)  walker, front-wheeled  -EJ      Recorded by [EJ] Natasha García, PT 03/01/19 1009      Row Name 03/01/19 0932             Sit-Stand Transfer    Sit-Stand [place] (Transfers)  verbal cues;moderate assist (50% patient effort);2 person assist  -EJ      Assistive Device (Sit-Stand Transfers)  walker, front-wheeled  -EJ      Recorded by [EJ] Natasha García, PT 03/01/19 1009      Row Name 03/01/19 0932             Stand-Sit Transfer    Stand-Sit [place] (Transfers)  2 person assist;verbal cues;minimum assist (75% patient effort)  -EJ      Recorded by [EJ] Natasha García, PT 03/01/19 1009      Row Name 03/01/19 0932             Gait/Stairs Assessment/Training    Comment (Gait/Stairs)  Pt perform stand step t/f to chair. Once pt is in front of chair he is cued for marching with RLE only to prevent additional WB on RLE beyond t/f.   -EJ      Recorded by [EJ] Natasha García, PT 03/01/19 1009      Row Name 03/01/19 0932             Motor Skills Assessment/Interventions    Additional Documentation  Therapeutic Exercise Interventions (Group);Therapeutic Exercise (Group)  -EJ      Recorded by [EJ] Natasha García, PT 03/01/19 1009      Row Name 03/01/19 0932             Therapeutic Exercise    63423 - PT Therapeutic Exercise Minutes  6  -EJ      87175 - PT Therapeutic Activity Minutes  18  -EJ      Recorded by [EJ] Natasha García, PT 03/01/19 1009      Row Name 03/01/19 0932             Therapeutic Exercise    Lower Extremity (Therapeutic Exercise)  gluteal sets;quad sets, bilateral;SLR (straight leg raise), right 10 marches with RLE up only  -EJ      Lower Extremity Range of Motion (Therapeutic Exercise)  hip  abduction/adduction, right;ankle dorsiflexion/plantar flexion, left  -EJ      Sets/Reps (Therapeutic Exercise)  1/10  -EJ      Recorded by [EJ] Natasha García, PT 03/01/19 1009      Row Name 03/01/19 0932             Static Sitting Balance    Level of [place] (Unsupported Sitting, Static Balance)  contact guard assist  -EJ      Sitting Position (Unsupported Sitting, Static Balance)  sitting on edge of bed  -EJ      Time Able to Maintain Position (Unsupported Sitting, Static Balance)  3 to 4 minutes  -EJ      Recorded by [EJ] Natasha García, PT 03/01/19 1009      Row Name 03/01/19 0932             Positioning and Restraints    Pre-Treatment Position  in bed  -EJ      Post Treatment Position  chair  -EJ      In Chair  notified nsg;reclined;call light within reach;encouraged to call for assist;waffle cushion;on mechanical lift sling;RLE elevated  -EJ      Recorded by [EJ] Natasha García, PT 03/01/19 1009      Row Name 03/01/19 0932             Pain Scale: Numbers Pre/Post-Treatment    Pain Scale: Numbers, Pretreatment  0/10 - no pain  -EJ      Pain Location - Side  Right  -EJ      Pain Location  ankle also L elbow  -EJ      Pre/Post Treatment Pain Comment  tolerated  -EJ      Recorded by [EJ] Natasha García, PT 03/01/19 1009      Row Name 03/01/19 0932             Pain Scale: FACES Pre/Post-Treatment    Pain: FACES Scale, Post-Treatment  4-->hurts little more with movement in bed grimace noted  -EJ      Recorded by [EJ] Natasha García, PT 03/01/19 1009      Row Name                Wound 02/23/19 1529 Right leg incision    Wound - Properties Group Date first assessed: 02/23/19 [TB] Time first assessed: 1529 [TB] Side: Right [TB] Location: leg [TB] Type: incision [TB] Recorded by:  [TB] Fabienne Calderon RN 02/23/19 1529    Row Name 03/01/19 0932             Coping    Observed Emotional State  accepting;cooperative  -EJ      Verbalized Emotional State  acceptance  -EJ      Recorded by [EJ] Ricky  Natasha MARIE, PT 03/01/19 1009      Row Name 03/01/19 0932             Plan of Care Review    Plan of Care Reviewed With  patient  -EJ      Recorded by [EJ] RickyNyaNatasha A, PT 03/01/19 1009      Row Name 03/01/19 0932             Outcome Summary/Treatment Plan (PT)    Daily Summary of Progress (PT)  progress toward functional goals is good  -EJ      Anticipated Discharge Disposition (PT)  inpatient rehabilitation facility  -EJ      Recorded by [EJ] RickyNyaNatasha A, PT 03/01/19 1009        User Key  (r) = Recorded By, (t) = Taken By, (c) = Cosigned By    Initials Name Effective Dates Discipline    EJ Natasha García, PT 11/20/18 -  PT    TB Fabienne Calderon, RN 06/16/16 -  Nurse          Wound 02/23/19 1529 Right leg incision (Active)   Dressing Appearance dry;intact;no drainage 3/1/2019  6:00 AM   Closure ALEXEI 3/1/2019  6:00 AM   Base dressing in place, unable to visualize 3/1/2019  6:00 AM   Dressing Care, Wound other (see comments) 2/28/2019  8:00 PM           Physical Therapy Education     Title: PT OT SLP Therapies (In Progress)     Topic: Physical Therapy (Done)     Point: Mobility training (Done)     Learning Progress Summary           Patient Acceptance, E, VU,NR by DIANA at 3/1/2019  9:32 AM    Acceptance, E, VU,NR by DIANA at 2/28/2019 11:35 AM    Acceptance, E,D, NR by MARK at 2/27/2019  9:10 AM    Acceptance, E,TB, VU by  at 2/25/2019  4:51 PM    Acceptance, E,D, VU,NR by NICKIE at 2/24/2019 10:19 AM    Comment:  Reviewed benefits of activity, transfer training, weight-bearing status, progression of POC.   Family Acceptance, E,TB, VU by  at 2/25/2019  4:51 PM    Acceptance, E,D, VU,NR by NICKIE at 2/24/2019 10:19 AM    Comment:  Reviewed benefits of activity, transfer training, weight-bearing status, progression of POC.                   Point: Home exercise program (Done)     Learning Progress Summary           Patient Acceptance, E, VU,NR by DIANA at 3/1/2019  9:32 AM    Acceptance, E, VU,NR by DIANA at 2/28/2019  11:35 AM    Acceptance, E,D, NR by MARK at 2/27/2019  9:10 AM    Acceptance, E,TB, VU by  at 2/25/2019  4:51 PM    Acceptance, E,D, VU,NR by NICKIE at 2/24/2019 10:19 AM    Comment:  Reviewed benefits of activity, transfer training, weight-bearing status, progression of POC.   Family Acceptance, E,TB, VU by  at 2/25/2019  4:51 PM    Acceptance, E,D, VU,NR by NICKIE at 2/24/2019 10:19 AM    Comment:  Reviewed benefits of activity, transfer training, weight-bearing status, progression of POC.                   Point: Body mechanics (Done)     Learning Progress Summary           Patient Acceptance, E, VU,NR by DIANA at 3/1/2019  9:32 AM    Acceptance, E, VU,NR by DIANA at 2/28/2019 11:35 AM    Acceptance, E,D, NR by MARK at 2/27/2019  9:10 AM    Acceptance, E,TB, VU by  at 2/25/2019  4:51 PM    Acceptance, E,D, VU,NR by NICKIE at 2/24/2019 10:19 AM    Comment:  Reviewed benefits of activity, transfer training, weight-bearing status, progression of POC.   Family Acceptance, E,TB, VU by  at 2/25/2019  4:51 PM    Acceptance, E,D, VU,NR by NICKIE at 2/24/2019 10:19 AM    Comment:  Reviewed benefits of activity, transfer training, weight-bearing status, progression of POC.                   Point: Precautions (Done)     Learning Progress Summary           Patient Acceptance, E, VU,NR by DIANA at 3/1/2019  9:32 AM    Acceptance, E, VU,NR by DIANA at 2/28/2019 11:35 AM    Acceptance, E,D, NR by MARK at 2/27/2019  9:10 AM    Acceptance, E,TB, VU by  at 2/25/2019  4:51 PM    Acceptance, E,D, VU,NR by NICKIE at 2/24/2019 10:19 AM    Comment:  Reviewed benefits of activity, transfer training, weight-bearing status, progression of POC.   Family Acceptance, E,TB, VU by  at 2/25/2019  4:51 PM    Acceptance, E,D, VU,NR by NICKIE at 2/24/2019 10:19 AM    Comment:  Reviewed benefits of activity, transfer training, weight-bearing status, progression of POC.                               User Key     Initials Effective Dates Name Provider Type Discipline    EJ  11/20/18 -  Natasha García, PT Physical Therapist PT    LS 06/19/15 -  Francoise Johnson, PT Physical Therapist PT    JR 02/06/17 -  Aleksandr Constantino RN Registered Nurse Nurse    LM 04/03/18 -  Noemi Castro, PT Physical Therapist PT                PT Recommendation and Plan  Anticipated Discharge Disposition (PT): inpatient rehabilitation facility  Therapy Frequency (PT Clinical Impression): daily  Outcome Summary/Treatment Plan (PT)  Daily Summary of Progress (PT): progress toward functional goals is good  Anticipated Discharge Disposition (PT): inpatient rehabilitation facility  Plan of Care Reviewed With: patient  Progress: improving  Outcome Summary: Pt t/f with MOD A x2 for sit to stand with RWx, then stand step t/f to chair with MIN A x2 using RWx. Pt then performs marching with R lift off only to prevent further weight bearing. Pt participated in ther ex well and did much better with bed mobility.  Outcome Measures     Row Name 03/01/19 0932 02/28/19 1136 02/28/19 1135       How much help from another person do you currently need...    Turning from your back to your side while in flat bed without using bedrails?  3  -EJ  --  2  -EJ    Moving from lying on back to sitting on the side of a flat bed without bedrails?  3  -EJ  --  2  -EJ    Moving to and from a bed to a chair (including a wheelchair)?  3  -EJ  --  2  -EJ    Standing up from a chair using your arms (e.g., wheelchair, bedside chair)?  2  -EJ  --  2  -EJ    Climbing 3-5 steps with a railing?  1  -EJ  --  1  -EJ    To walk in hospital room?  1  -EJ  --  1  -EJ    AM-PAC 6 Clicks Score  13  -EJ  --  10  -EJ       How much help from another is currently needed...    Putting on and taking off regular lower body clothing?  --  1  -AR  --    Bathing (including washing, rinsing, and drying)  --  2  -AR  --    Toileting (which includes using toilet bed pan or urinal)  --  2  -AR  --    Putting on and taking off regular upper body clothing  --  3  -AR   --    Taking care of personal grooming (such as brushing teeth)  --  3  -AR  --    Eating meals  --  3  -AR  --    Score  --  14  -AR  --       Functional Assessment    Outcome Measure Options  AM-PAC 6 Clicks Basic Mobility (PT)  -  AM-PAC 6 Clicks Daily Activity (OT)  -AR  AM-[facility] 6 Clicks Basic Mobility (PT)  -    Row Name 02/27/19 0910             How much help from another person do you currently need...    Turning from your back to your side while in flat bed without using bedrails?  3  -LS      Moving from lying on back to sitting on the side of a flat bed without bedrails?  3  -LS      Moving to and from a bed to a chair (including a wheelchair)?  3  -LS      Standing up from a chair using your arms (e.g., wheelchair, bedside chair)?  2  -LS      Climbing 3-5 steps with a railing?  1  -LS      To walk in hospital room?  1  -LS      AM-PAC 6 Clicks Score  13  -LS         Functional Assessment    Outcome Measure Options  -[facility] 6 Clicks Basic Mobility (PT)  -        User Key  (r) = Recorded By, (t) = Taken By, (c) = Cosigned By    Initials Name Provider Type     Natasha García, PT Physical Therapist    Barbara Tran, OT Occupational Therapist    Francoise Rodriguez, PT Physical Therapist         Time Calculation:   PT Charges     Row Name 03/01/19 0932             Time Calculation    Start Time  0932  -EJ      PT Received On  03/01/19  -      PT Goal Re-Cert Due Date  03/09/19  -         Time Calculation- PT    Total Timed Code Minutes- PT  24 minute(s)  -         Timed Charges    29473 - PT Therapeutic Exercise Minutes  6  -EJ      16496 - PT Therapeutic Activity Minutes  18  -EJ        User Key  (r) = Recorded By, (t) = Taken By, (c) = Cosigned By    Initials Name Provider Type     Natasha García, PT Physical Therapist        Therapy Suggested Charges     Code   Minutes Charges    02660 (CPT®) Hc Pt Neuromusc Re Education Ea 15 Min      26649 (CPT®) Hc Pt Ther Proc Ea 15 Min 6 1     43617 (CPT®) Hc Gait Training Ea 15 Min      36639 (CPT®) Hc Pt Therapeutic Act Ea 15 Min 18 1    92727 (CPT®) Hc Pt Manual Therapy Ea 15 Min      19764 (CPT®) Hc Pt Iontophoresis Ea 15 Min      97194 (CPT®) Hc Pt Elec Stim Ea-Per 15 Min      41319 (CPT®) Hc Pt Ultrasound Ea 15 Min      30987 (CPT®) Hc Pt Self Care/Mgmt/Train Ea 15 Min      49400 (CPT®) Hc Pt Prosthetic (S) Train Initial Encounter, Each 15 Min      00675 (CPT®) Hc Pt Orthotic(S)/Prosthetic(S) Encounter, Each 15 Min      48921 (CPT®) Hc Orthotic(S) Mgmt/Train Initial Encounter, Each 15min      Total  24 2        Therapy Charges for Today     Code Description Service Date Service Provider Modifiers Qty    83978703306 HC PT THERAPEUTIC ACT EA 15 MIN 2/28/2019 Natasha García, PT GP 2    86357812877 HC PT THER PROC EA 15 MIN 3/1/2019 Natasha García, PT GP 1    20151330877 HC PT THERAPEUTIC ACT EA 15 MIN 3/1/2019 Natasha García, PT GP 1    22533798408 HC PT THER SUPP EA 15 MIN 3/1/2019 Natasha García, PT GP 2          PT G-Codes  Outcome Measure Options: AM-PAC 6 Clicks Basic Mobility (PT)  AM-PAC 6 Clicks Score: 13  Score: 14    Natasha Cancino, PT  3/1/2019

## 2019-03-01 NOTE — PROGRESS NOTES
Clinical Nutrition   Reason For Visit: [facility]    Patient Name: Lico Diego  YOB: 1935  MRN: [id number]  Date of Encounter: 03/01/19 1:07 PM  Admission date: 2/22/2019        Nutrition Assessment     Admission Problem List:  Bimalleolar ankle fracture, right  Acute respiratory failure with hypoxia  Acute on chronic CHF  AKILA on CKD  UTI      Applicable medical tests/procedures since admission:  (2/23) s/p ORIF        PMH: He  has a past medical history of Basal cell carcinoma, CKD (chronic kidney disease), COPD (chronic obstructive pulmonary disease) (CMS/[facility]), Coronary artery disease, Diabetes mellitus (CMS/[facility]), Ejection fraction < 50%, Hypertension, Myocardial infarction (CMS/[facility]), JC (obstructive sleep apnea), and Prostate cancer (CMS/[facility]).   PSxH: He  has a past surgical history that includes Coronary artery bypass graft; Skin biopsy; and OPEN REDUCTION INTERNAL FIXATION RIGHT LATERAL MALLEOLUS FRACTURE AND SYNDESMOSIS (Right, 2/23/2019).        Reported/Observed/Food/Nutrition Related History   Patient and family present at visit. Patient and family report that patient's appetite and PO intake have been great, estimate average meal intake of %. Oral nutrition supplements not appropriate at this time. No preferences/requests at this time.      Anthropometrics   Height: 71 in   Weight: 261 lbs (bedscale wt 3/1 per [facility] doc)  BMI: 36.5  BMI classification: Obese Class II: 35-39.9kg/m2     Labs reviewed   Labs reviewed: Yes    Medications reviewed   Medications reviewed: Yes    Current Nutrition Prescription   PO: Diet Regular; Cardiac, Consistent Carbohydrate    Evaluation of Received Nutrient/Fluid Intake: 62% / 2 meals documented for entire admission (insufficient data)      Nutrition Diagnosis     Problem No nutrition diagnosis at this time   Etiology    Signs/Symptoms        Intervention   Intervention: Follow treatment progress, Care plan reviewed      Goal:   General: Nutrition support  treatment  PO: Maintain intake      Monitoring/Evaluation:       Monitoring/Evaluation: Per protocol, PO intake  Will Continue to follow per protocol  Saranya Viveros RD  Time Spent: 25 min

## 2019-03-01 NOTE — PLAN OF CARE
Problem: Fall Risk (Adult)  Goal: Identify Related Risk Factors and Signs and Symptoms  Outcome: Ongoing (interventions implemented as appropriate)   03/01/19 1847   Fall Risk (Adult)   Related Risk Factors (Fall Risk) age-related changes;gait/mobility problems;history of falls;environment unfamiliar   Signs and Symptoms (Fall Risk) presence of risk factors     Goal: Absence of Fall  Outcome: Ongoing (interventions implemented as appropriate)   03/01/19 1847   Fall Risk (Adult)   Absence of Fall achieves outcome       Problem: Skin Injury Risk (Adult)  Goal: Identify Related Risk Factors and Signs and Symptoms  Outcome: Ongoing (interventions implemented as appropriate)   03/01/19 1847   Skin Injury Risk (Adult)   Related Risk Factors (Skin Injury Risk) advanced age;body weight extremes;hospitalization prolonged;medical devices;mechanical forces;mobility impaired     Goal: Skin Health and Integrity  Outcome: Ongoing (interventions implemented as appropriate)   03/01/19 1847   Skin Injury Risk (Adult)   Skin Health and Integrity achieves outcome       Problem: Patient Care Overview  Goal: Plan of Care Review  Outcome: Ongoing (interventions implemented as appropriate)   03/01/19 1847   Coping/Psychosocial   Plan of Care Reviewed With patient   Plan of Care Review   Progress no change   OTHER   Outcome Summary VSS, Added fluid restriction 1000ml/day, Up with assist x2 to chair. Successful BM after prn medications and prune juice. SR with freq PVCs, PACs. PRN lortab x1 for pain.  RLE dressing cdi, +pulses, warm. Family at bedside throughout day.        Problem: Fracture Orthopaedic (Adult)  Goal: Signs and Symptoms of Listed Potential Problems Will be Absent, Minimized or Managed (Fracture Orthopaedic)  Outcome: Ongoing (interventions implemented as appropriate)   03/01/19 1847   Goal/Outcome Evaluation   Problems Assessed (Orthopaedic Fracture) all   Problems Present (Orthopaedic Fracture) functional deficit/self-care  deficit

## 2019-03-01 NOTE — PROGRESS NOTES
"   LOS: 7 days    Patient Care Team:  Khai Menjivar DO as PCP - General (Family Medicine)  Ti Santiago MD as Consulting Physician (Interventional Cardiology)    Subjective     No acute events overnight. No new complaints.     Objective     Vital Signs:  Blood pressure 138/53, pulse 73, temperature 99.1 °F (37.3 °C), temperature source Oral, resp. rate 16, height 180.3 cm (71\"), weight 119 kg (261 lb 14.4 oz), SpO2 93 %.    Flowsheet Rows      First Filed Value   Admission Height  180.3 cm (71\") Documented at 02/22/2019 1200   Admission Weight  119 kg (263 lb) Documented at 02/22/2019 1200          02/28 0701 - 03/01 0700  In: 250   Out: 550 [Urine:550]    Physical Exam:    General Appearance: NAD WD obese WM  Neuro:   awake alert   Psych:  Normal mood and affect  CV:  RRR,  no edema  Lungs:  Mild increased WOB,  + rhonchi  Abdomen: not distended, bowel sounds present  :  No mcdonald, no palp bladder  Skin: stasis dermatitis left lower ext, right in bandage, Warm and dry      Labs:  Results from last 7 days   Lab Units 02/28/19  0535 02/27/19  0337 02/26/19  0014   WBC 10*3/mm3 8.45 7.00 7.02   HEMOGLOBIN g/dL 9.3* 9.2* 9.7*   PLATELETS 10*3/mm3 244 229 183     Results from last 7 days   Lab Units 03/01/19  0600 02/28/19  1613 02/28/19  0535 02/27/19  0337 02/26/19  0951  02/26/19  0016  02/25/19  0524 02/24/19  0723   SODIUM mmol/L 128* 128* 130* 132  --    < > 133   < > 133 131*   POTASSIUM mmol/L 4.1 4.5 3.9 4.1  --    < > 4.3   < > 5.2 5.0   CHLORIDE mmol/L 97* 94* 97* 99  --    < > 101   < > 105 105   CO2 mmol/L 24.0 26.0 24.0 24.0  --    < > 24.0   < > 20.0 19.0*   BUN mg/dL 48* 46* 37* 40*  --    < > 44*   < > 44* 54*   CREATININE mg/dL 1.80* 1.92* 1.50* 1.59*  --    < > 1.69*   < > 1.97* 2.32*   CALCIUM mg/dL 8.1* 8.2* 8.3* 8.6*  --    < > 8.6*   < > 8.2* 7.5*   PHOSPHORUS mg/dL  --   --   --  2.6 2.3*  --   --   --  3.5 4.2   MAGNESIUM mg/dL  --   --  2.2 2.2 1.8  --  2.0  --   --   --    ALBUMIN g/dL "  --   --   --  3.67  --   --  3.76  --  3.71 3.35    < > = values in this interval not displayed.     Results from last 7 days   Lab Units 02/27/19  0337   ALK PHOS U/L 67   BILIRUBIN mg/dL 0.9   ALT (SGPT) U/L 14   AST (SGOT) U/L 24     Results from last 7 days   Lab Units 02/26/19  0016   PH, ARTERIAL pH units 7.356   PO2 ART mm Hg 60.5*   PCO2, ARTERIAL mm Hg 41.1   HCO3 ART mmol/L 23.0     Results from last 7 days   Lab Units 02/28/19  2131   COLOR UA  Yellow   CLARITY UA  Clear   PH, URINE  <=5.0   SPECIFIC GRAVITY, URINE  1.020   GLUCOSE UA  Negative   KETONES UA  Negative   BILIRUBIN UA  Negative   PROTEIN UA  Negative   BLOOD UA  Negative   LEUKOCYTES UA  Negative   NITRITE UA  Negative       Estimated Creatinine Clearance: 41.5 mL/min (A) (by C-G formula based on SCr of 1.8 mg/dL (H)).         A/P:    ARF:  Non-oliguric  secondary to decreased IVV, vomiting,Slighty better after discontinuing diuresis     CKD3:  secondary to nephrosclerosis.  Baseline 1.7-1.8    HTN - Controlled    Anemia - iron def.  Getting IVFe    Hyponatremia: 128 -stable.     Plan:  Fluid restriction - orders placed   Monitor I/o   Avoid nephrotoxic agents.   Renal diet.     Case discussed with Family members and RN.     Della Santillan MD  03/01/19  2:48 PM

## 2019-03-01 NOTE — PROGRESS NOTES
"   LOS: 6 days    Patient Care Team:  Khai Menjivar DO as PCP - General (Family Medicine)  Ti Santiago MD as Consulting Physician (Interventional Cardiology)    Subjective     No acute events overnight. No new complaints.     Objective     Vital Signs:  Blood pressure 120/86, pulse 78, temperature 98.4 °F (36.9 °C), temperature source Oral, resp. rate 17, height 180.3 cm (71\"), weight 114 kg (251 lb 5.2 oz), SpO2 92 %.    Flowsheet Rows      First Filed Value   Admission Height  180.3 cm (71\") Documented at 02/22/2019 1200   Admission Weight  119 kg (263 lb) Documented at 02/22/2019 1200          02/27 0701 - 02/28 0700  In: 894 [P.O.:780; I.V.:114]  Out: 2700 [Urine:2700]    Physical Exam:    General Appearance: NAD WD obese WM  Neuro:   awake alert   Psych:  Normal mood and affect  CV:  RRR,  no edema  Lungs:  Mild increased WOB,  + rhonchi  Abdomen: not distended, bowel sounds present  :  No mcdonald, no palp bladder  Skin: stasis dermatitis left lower ext, right in bandage, Warm and dry      Labs:  Results from last 7 days   Lab Units 02/28/19  0535 02/27/19  0337 02/26/19  0014   WBC 10*3/mm3 8.45 7.00 7.02   HEMOGLOBIN g/dL 9.3* 9.2* 9.7*   PLATELETS 10*3/mm3 244 229 183     Results from last 7 days   Lab Units 02/28/19  1613 02/28/19  0535 02/27/19  0337 02/26/19  0951 02/26/19  0424 02/26/19  0016  02/25/19  0524 02/24/19  0723   SODIUM mmol/L 128* 130* 132  --  132 133   < > 133 131*   POTASSIUM mmol/L 4.5 3.9 4.1  --  4.2 4.3   < > 5.2 5.0   CHLORIDE mmol/L 94* 97* 99  --  103 101   < > 105 105   CO2 mmol/L 26.0 24.0 24.0  --  22.0 24.0   < > 20.0 19.0*   BUN mg/dL 46* 37* 40*  --  41* 44*   < > 44* 54*   CREATININE mg/dL 1.92* 1.50* 1.59*  --  1.60* 1.69*   < > 1.97* 2.32*   CALCIUM mg/dL 8.2* 8.3* 8.6*  --  8.5* 8.6*   < > 8.2* 7.5*   PHOSPHORUS mg/dL  --   --  2.6 2.3*  --   --   --  3.5 4.2   MAGNESIUM mg/dL  --  2.2 2.2 1.8  --  2.0  --   --   --    ALBUMIN g/dL  --   --  3.67  --   --  3.76  " --  3.71 3.35    < > = values in this interval not displayed.     Results from last 7 days   Lab Units 02/27/19  0337   ALK PHOS U/L 67   BILIRUBIN mg/dL 0.9   ALT (SGPT) U/L 14   AST (SGOT) U/L 24     Results from last 7 days   Lab Units 02/26/19  0016   PH, ARTERIAL pH units 7.356   PO2 ART mm Hg 60.5*   PCO2, ARTERIAL mm Hg 41.1   HCO3 ART mmol/L 23.0     Results from last 7 days   Lab Units 02/23/19  0054   COLOR UA  Yellow   CLARITY UA  Clear   PH, URINE  <=5.0   SPECIFIC GRAVITY, URINE  1.017   GLUCOSE UA  Negative   KETONES UA  Trace*   BILIRUBIN UA  Negative   PROTEIN UA  Negative   BLOOD UA  Negative   LEUKOCYTES UA  Negative   NITRITE UA  Negative       Estimated Creatinine Clearance: 38.1 mL/min (A) (by C-G formula based on SCr of 1.92 mg/dL (H)).         A/P:    ARF:  Non-oliguric  secondary to decreased IVV, vomiting, +ACE, creaTININE WORST, STOP BUMEX    CKD3:  secondary to nephrosclerosis.  Baseline 1.7-1.8    HTN - not controlled. Will restart on lisinopril as small dose    Anemia - iron def.  Getting IVFe    Hyponatremia: stop Bumex, CHECK URINE AND SERUM OSM  discussed with HOLLIE Luis MD  02/28/19  8:57 PM

## 2019-03-02 LAB
ANION GAP SERPL CALCULATED.3IONS-SCNC: 8 MMOL/L (ref 3–11)
BUN BLD-MCNC: 56 MG/DL (ref 9–23)
BUN/CREAT SERPL: 32.2 (ref 7–25)
CALCIUM SPEC-SCNC: 8.3 MG/DL (ref 8.7–10.4)
CHLORIDE SERPL-SCNC: 100 MMOL/L (ref 99–109)
CO2 SERPL-SCNC: 25 MMOL/L (ref 20–31)
CREAT BLD-MCNC: 1.74 MG/DL (ref 0.6–1.3)
GFR SERPL CREATININE-BSD FRML MDRD: 38 ML/MIN/1.73
GLUCOSE BLD-MCNC: 132 MG/DL (ref 70–100)
GLUCOSE BLDC GLUCOMTR-MCNC: 135 MG/DL (ref 70–130)
GLUCOSE BLDC GLUCOMTR-MCNC: 191 MG/DL (ref 70–130)
GLUCOSE BLDC GLUCOMTR-MCNC: 266 MG/DL (ref 70–130)
GLUCOSE BLDC GLUCOMTR-MCNC: 309 MG/DL (ref 70–130)
POTASSIUM BLD-SCNC: 4.4 MMOL/L (ref 3.5–5.5)
SODIUM BLD-SCNC: 133 MMOL/L (ref 132–146)

## 2019-03-02 PROCEDURE — 99232 SBSQ HOSP IP/OBS MODERATE 35: CPT | Performed by: INTERNAL MEDICINE

## 2019-03-02 PROCEDURE — 25010000002 ENOXAPARIN PER 10 MG: Performed by: ORTHOPAEDIC SURGERY

## 2019-03-02 PROCEDURE — 94799 UNLISTED PULMONARY SVC/PX: CPT

## 2019-03-02 PROCEDURE — 82962 GLUCOSE BLOOD TEST: CPT

## 2019-03-02 PROCEDURE — 80048 BASIC METABOLIC PNL TOTAL CA: CPT | Performed by: INTERNAL MEDICINE

## 2019-03-02 PROCEDURE — 63710000001 INSULIN DETEMIR PER 5 UNITS: Performed by: INTERNAL MEDICINE

## 2019-03-02 PROCEDURE — 97530 THERAPEUTIC ACTIVITIES: CPT

## 2019-03-02 PROCEDURE — 97110 THERAPEUTIC EXERCISES: CPT

## 2019-03-02 RX ADMIN — IPRATROPIUM BROMIDE AND ALBUTEROL SULFATE 3 ML: 2.5; .5 SOLUTION RESPIRATORY (INHALATION) at 12:03

## 2019-03-02 RX ADMIN — BUDESONIDE AND FORMOTEROL FUMARATE DIHYDRATE 2 PUFF: 80; 4.5 AEROSOL RESPIRATORY (INHALATION) at 08:09

## 2019-03-02 RX ADMIN — IPRATROPIUM BROMIDE AND ALBUTEROL SULFATE 3 ML: 2.5; .5 SOLUTION RESPIRATORY (INHALATION) at 19:39

## 2019-03-02 RX ADMIN — ATORVASTATIN CALCIUM 10 MG: 10 TABLET, FILM COATED ORAL at 08:30

## 2019-03-02 RX ADMIN — CYANOCOBALAMIN TAB 1000 MCG 1000 MCG: 1000 TAB at 08:29

## 2019-03-02 RX ADMIN — DOCUSATE SODIUM 100 MG: 100 CAPSULE, LIQUID FILLED ORAL at 08:30

## 2019-03-02 RX ADMIN — LOSARTAN POTASSIUM 25 MG: 25 TABLET, FILM COATED ORAL at 08:30

## 2019-03-02 RX ADMIN — CITALOPRAM HYDROBROMIDE 10 MG: 20 TABLET ORAL at 08:29

## 2019-03-02 RX ADMIN — INSULIN LISPRO 6 UNITS: 100 INJECTION, SOLUTION INTRAVENOUS; SUBCUTANEOUS at 17:03

## 2019-03-02 RX ADMIN — IPRATROPIUM BROMIDE AND ALBUTEROL SULFATE 3 ML: 2.5; .5 SOLUTION RESPIRATORY (INHALATION) at 16:05

## 2019-03-02 RX ADMIN — IPRATROPIUM BROMIDE AND ALBUTEROL SULFATE 3 ML: 2.5; .5 SOLUTION RESPIRATORY (INHALATION) at 08:09

## 2019-03-02 RX ADMIN — METOPROLOL SUCCINATE 100 MG: 100 TABLET, FILM COATED, EXTENDED RELEASE ORAL at 08:29

## 2019-03-02 RX ADMIN — INSULIN LISPRO 2 UNITS: 100 INJECTION, SOLUTION INTRAVENOUS; SUBCUTANEOUS at 12:00

## 2019-03-02 RX ADMIN — SODIUM CHLORIDE, PRESERVATIVE FREE 3 ML: 5 INJECTION INTRAVENOUS at 21:42

## 2019-03-02 RX ADMIN — FLUTICASONE PROPIONATE 2 SPRAY: 50 SPRAY, METERED NASAL at 08:30

## 2019-03-02 RX ADMIN — INSULIN DETEMIR 18 UNITS: 100 INJECTION, SOLUTION SUBCUTANEOUS at 21:45

## 2019-03-02 RX ADMIN — ALLOPURINOL 100 MG: 100 TABLET ORAL at 08:29

## 2019-03-02 RX ADMIN — ASPIRIN 81 MG: 81 TABLET, COATED ORAL at 08:29

## 2019-03-02 RX ADMIN — BUDESONIDE AND FORMOTEROL FUMARATE DIHYDRATE 2 PUFF: 80; 4.5 AEROSOL RESPIRATORY (INHALATION) at 19:39

## 2019-03-02 RX ADMIN — ENOXAPARIN SODIUM 30 MG: 30 INJECTION SUBCUTANEOUS at 08:29

## 2019-03-02 RX ADMIN — INSULIN LISPRO 7 UNITS: 100 INJECTION, SOLUTION INTRAVENOUS; SUBCUTANEOUS at 21:41

## 2019-03-02 NOTE — PROGRESS NOTES
[facility] Medicine Services  PROGRESS NOTE    Patient Name: Lico Diego  : 1935  MRN: [id number]    Date of Admission: 2019  Length of Stay: 8  Primary Care Physician: Khai Menjivar DO    Subjective   Subjective     CC:  Ankle fracture     HPI:  No acute events. States he is feeling fine today. He had a BM yesterday.   Review of Systems  Gen- No fevers, chills  CV- No chest pain, palpitations  Resp- No cough, dyspnea  GI- No N/V/D, abd pain  Otherwise ROS is negative except as mentioned in the HPI.    Objective   Objective     Vital Signs:   Temp:  [97.3 °F (36.3 °C)-99.3 °F (37.4 °C)] 97.3 °F (36.3 °C)  Heart Rate:  [67-90] 78  Resp:  [16-24] 16  BP: (120-181)/(54-75) 159/75        Physical Exam:  Constitutional: No acute distress, awake, alert  HENT: NCAT, mucous membranes moist  Respiratory: Clear to auscultation bilaterally, respiratory effort normal; rhonchi clear with cough  Cardiovascular: RRR, + ectopy, II/VI murmurs, no rubs, or gallops, palpable pedal pulses bilaterally  Gastrointestinal: Positive bowel sounds, soft, nontender, nondistended  Musculoskeletal: No bilateral ankle edema  Psychiatric: Appropriate affect, cooperative  Neurologic: Oriented x 3, strength symmetric in all extremities, Cranial Nerves grossly intact to confrontation, speech clear  Skin: No rashes    Results Reviewed:  I have personally reviewed current lab, radiology, and data and agree.    Results from last 7 days   Lab Units 19  0535 19  0337 19  0014   WBC 10*3/mm3 8.45 7.00 7.02   HEMOGLOBIN g/dL 9.3* 9.2* 9.7*   HEMATOCRIT % 28.6* 28.5* 30.2*   PLATELETS 10*3/mm3 244 229 183     Results from last 7 days   Lab Units 19  0651 19  0600 19  1613  19  0337 19  0951 19  0424 19  0016   SODIUM mmol/L 133 128* 128*   < > 132  --  132 133   POTASSIUM mmol/L 4.4 4.1 4.5   < > 4.1  --  4.2 4.3   CHLORIDE mmol/L 100 97* 94*   < > 99   --  103 101   CO2 mmol/L 25.0 24.0 26.0   < > 24.0  --  22.0 24.0   BUN mg/dL 56* 48* 46*   < > 40*  --  41* 44*   CREATININE mg/dL 1.74* 1.80* 1.92*   < > 1.59*  --  1.60* 1.69*   GLUCOSE mg/dL 132* 110* 256*   < > 173*  --  159* 167*   CALCIUM mg/dL 8.3* 8.1* 8.2*   < > 8.6*  --  8.5* 8.6*   ALT (SGPT) U/L  --   --   --   --  14  --   --  18   AST (SGOT) U/L  --   --   --   --  24  --   --  28   TROPONIN I ng/mL  --   --   --   --   --  0.521* 0.631* 0.947*    < > = values in this interval not displayed.     Estimated Creatinine Clearance: 44.3 mL/min (A) (by C-G formula based on SCr of 1.74 mg/dL (H)).    BNP   Date Value Ref Range Status   02/28/2019 546.0 (H) 0.0 - 100.0 pg/mL Final     Comment:     Results may be falsely decreased if patient taking Biotin.       Microbiology Results Abnormal     None          Imaging Results (last 24 hours)     Procedure Component Value Units Date/Time    XR Elbow 3+ View Left [[id number]] Collected:  02/28/19 0824     Updated:  03/01/19 1549    Narrative:       EXAMINATION: XR ELBOW 3+ VW LEFT- 02/27/2019     INDICATION: medial sided elbow pain after a fall; W19.XXXA-Unspecified  fall, initial encounter; Y92.009-Unspecified place in unspecified  non-institutional (private) residence as the place of occurrence of the  external cause; I95.89-Other hypotension; E86.1-Hypovolemia;  E86.9-Volume depletion, unspecified; K52.9-Noninfective gastroenteritis  and colitis, unspecified; N17.9-Acute kidney failure, unspecified     COMPARISON: NONE     FINDINGS: AP, lateral and lateral oblique imaging of the left elbow  without acute fracture or cortical displacement of irregularity  identified. No elbow joint effusion. Degenerative changes are noted  throughout the elbow joint space with joint space narrowing however no  significant erosive component or radiopaque foreign body in the soft  tissues. Angiocatheter overlies the antecubital fossa.           Impression:       No acute fracture  or osseous malalignment of the left elbow  with degenerative changes noted throughout. No elbow joint effusion to  suggest underlying occult injury. If symptoms persist and/or progress  further imaging may be useful.     D:  02/28/2019  E:  02/28/2019     This report was finalized on 3/1/2019 3:47 PM by Dr. Santiago Saucedo.             Results for orders placed during the hospital encounter of 02/22/19   Adult Transthoracic Echo Complete W/ Cont if Necessary Per Protocol    Narrative · Left ventricular systolic function is low normal. Estimated EF = 45%.   Difficult to accurately assess regional wall motion abnormality and EF due   to frequent ventricular ectopy.  · Left ventricular diastolic dysfunction (grade I) consistent with   impaired relaxation.  · Left atrial cavity size is mildly dilated.  · Mild mitral valve regurgitation is present  · Moderate tricuspid valve regurgitation is present.  · Estimated right ventricular systolic pressure from tricuspid   regurgitation is markedly elevated (64 mmHg).          I have reviewed the medications:    Current Facility-Administered Medications:   •  acetaminophen (TYLENOL) tablet 650 mg, 650 mg, Oral, Q4H PRN, Obi Anglin MD  •  albuterol (PROVENTIL) nebulizer solution 0.083% 2.5 mg/3mL, 2.5 mg, Nebulization, Q6H PRN, Obi Anglin MD, 2.5 mg at 02/27/19 0410  •  allopurinol (ZYLOPRIM) tablet 100 mg, 100 mg, Oral, Daily, Obi Anglin MD, 100 mg at 03/02/19 0829  •  aspirin EC tablet 81 mg, 81 mg, Oral, Daily, Obi Anglin MD, 81 mg at 03/02/19 0829  •  atorvastatin (LIPITOR) tablet 10 mg, 10 mg, Oral, Daily, Obi Anglin MD, 10 mg at 03/02/19 0830  •  bisacodyl (DULCOLAX) suppository 10 mg, 10 mg, Rectal, Daily PRN, Rajani Diamond APRN  •  budesonide-formoterol (SYMBICORT) 80-4.5 MCG/ACT inhaler 2 puff, 2 puff, Inhalation, BID - RT, Lisset Vigil, DO, 2 puff at 03/02/19 0809  •  citalopram (CeleXA) tablet 10  mg, 10 mg, Oral, Daily, Obi Anglin MD, 10 mg at 03/02/19 0829  •  docusate sodium (COLACE) capsule 100 mg, 100 mg, Oral, BID PRN, Obi Anglin MD, 100 mg at 03/01/19 1330  •  docusate sodium (COLACE) capsule 100 mg, 100 mg, Oral, Daily, Rajani Diamond APRN, 100 mg at 03/02/19 0830  •  enoxaparin (LOVENOX) syringe 30 mg, 30 mg, Subcutaneous, Daily, Ken Abreu MD, 30 mg at 03/02/19 0829  •  fluticasone (FLONASE) 50 MCG/ACT nasal spray 2 spray, 2 spray, Each Nare, Daily, Julio Saldaña MD, 2 spray at 03/02/19 0830  •  HYDROcodone-acetaminophen (NORCO) 5-325 MG per tablet 1 tablet, 1 tablet, Oral, Q6H PRN, Flip Brown MD, 1 tablet at 03/01/19 1830  •  insulin detemir (LEVEMIR) injection 18 Units, 18 Units, Subcutaneous, Nightly, Lisset Vigil DO, 18 Units at 03/01/19 2039  •  insulin lispro (humaLOG) injection 0-9 Units, 0-9 Units, Subcutaneous, 4x Daily With Meals & Nightly, Obi Anglin MD, 2 Units at 03/02/19 1200  •  insulin lispro (humaLOG) injection 7 Units, 7 Units, Subcutaneous, TID With Meals, Lisset Vigil DO, 7 Units at 03/02/19 1159  •  ipratropium-albuterol (DUO-NEB) nebulizer solution 3 mL, 3 mL, Nebulization, 4x Daily - RT, Tyshawn Nielson MD, 3 mL at 03/02/19 1203  •  losartan (COZAAR) tablet 25 mg, 25 mg, Oral, Q24H, Sanjay Govea IV, MD, 25 mg at 03/02/19 0830  •  magnesium sulfate 4g/100mL (PREMIX) infusion, 4 g, Intravenous, PRN, Flip Brown MD, 4 g at 02/26/19 1513  •  melatonin sublingual tablet 5 mg, 5 mg, Sublingual, Nightly PRN, Obi Anglin MD  •  metoprolol succinate XL (TOPROL-XL) 24 hr tablet 100 mg, 100 mg, Oral, Daily, Sanjay Govea IV, MD, 100 mg at 03/02/19 0829  •  Morphine sulfate (PF) injection 1 mg, 1 mg, Intravenous, Q4H PRN **AND** [DISCONTINUED] naloxone (NARCAN) injection 0.4 mg, 0.4 mg, Intravenous, Q5 Min PRN, Obi Anglin MD  •  nitroglycerin  (NITROSTAT) SL tablet 0.4 mg, 0.4 mg, Sublingual, Q5 Min PRN, Julio Saldaña MD, 0.4 mg at 02/25/19 0758  •  [DISCONTINUED] ondansetron (ZOFRAN) tablet 4 mg, 4 mg, Oral, Q6H PRN **OR** ondansetron (ZOFRAN) injection 4 mg, 4 mg, Intravenous, Q6H PRN, Obi Anglin MD, 4 mg at 02/28/19 0704  •  Pharmacy Consult - [facility], , Does not apply, Daily, Yancy Stovall, [facility], Stopped at 02/28/19 1013  •  Pharmacy Meds to Bed Consult, , Does not apply, Daily, Sanjay Govea IV, MD, Stopped at 03/01/19 0125  •  polyethylene glycol 3350 powder (packet), 17 g, Oral, BID PRN, Lisset Vigil DO, 17 g at 03/01/19 1330  •  sodium chloride 0.9 % flush 10 mL, 10 mL, Intravenous, PRN, Kyle Mendoza MD  •  sodium chloride 0.9 % flush 3 mL, 3 mL, Intravenous, Q12H, Obi Anglin MD, 3 mL at 03/01/19 0900  •  sodium chloride 0.9 % flush 3 mL, 3 mL, Intravenous, Q12H, Ken Abreu MD, 3 mL at 03/01/19 2039  •  sodium chloride 0.9 % flush 3-10 mL, 3-10 mL, Intravenous, PRN, Ken Abreu MD  •  vitamin B-12 (CYANOCOBALAMIN) tablet 1,000 mcg, 1,000 mcg, Oral, Daily, Obi Anglin MD, 1,000 mcg at 03/02/19 0829      Assessment/Plan   Assessment / Plan     Active Hospital Problems    Diagnosis Date Noted   • **Bimalleolar ankle fracture, right, closed, initial encounter [S82.841A] 02/22/2019   • Premature ventricular contractions (PVCs) (VPCs) [I49.3] 02/27/2019   • JC (obstructive sleep apnea) [G47.33] 02/26/2019     cpap     • Acute respiratory failure with hypoxia (CMS/[facility]) [J96.01] 02/25/2019   • Coronary artery disease involving native coronary artery of native heart with angina pectoris (CMS/[facility]) [I25.119] 02/25/2019     · CABG (1998): LIMA to LAD, SVG to circumflex, SVG to RPDA, SVG to diagonal. Normal LVEF  · Cardiac cath (2003): 3/4 grafts patent. Normal LVEF.  Medical management  · PET stress (2013): Inferior infarct with ischemia. Medical management recommended      • Demand ischemia (CMS/HCC) [I24.8] 02/25/2019   • CKD (chronic kidney disease) stage 3, GFR 30-59 ml/min (CMS/HCC) [N18.3] 02/22/2019   • Ischemic cardiomyopathy [I25.5] 02/22/2019   • Acute on chronic systolic congestive heart failure (CMS/HCC) [I50.23] 02/22/2019     · Echo (2/26/2019): LVEF 45%.  No significant valvular abnormality.  RVSP 64 mmHg     • Severe obesity (BMI 35.0-35.9 with comorbidity) (CMS/HCC) [E66.01, Z68.35] 02/22/2019   • Type 2 diabetes mellitus with diabetic polyneuropathy, with long-term current use of insulin (CMS/HCC) [E11.42, Z79.4] 02/22/2019   • COPD (chronic obstructive pulmonary disease) (CMS/HCC) [J44.9] 02/22/2019   • Essential hypertension [I10] 02/22/2019          Brief Hospital Course to date:  Ms. Diego is a 83 year old man with history of COPD, CAD s/p CABG, chronic systolic heart failure who was admitted 2/22 with closed right ankle fracture. Found to have AKILA on CKD. Resp status decompensated after ORIF 2/23 and he was placed on BiPAP. Found to have elevated BNP and trop likely demand related. He was admitted to the ICU and was diuresed. Cardiology and nephrology consulted. Patient transferred to medicine service 2/28.      Bimalleolar ankle fracutre   - s/p repain 2/23   - Ortho following; post op care per ortho   - Follow up 3/12      Acute resp failure with hypoxia   - improving and around baseline per family  - monitoring on fluid restriction   - Continue duo-nebs      Acute on chronic systolic heart failure   Ischemic cardiomyopathy   - Cards evaluated  - EF 45%   - Monitor I/Os; daily weights - weights have been very variable   - continue losartan, metoprolol -- likely will start entresto as outpatient   - consider noninvasive ischemic eval - can be done as outpatient   - Follow up with Dr. Govea in 4 weeks      AKILA on CKD   - Neph following  - ARB started; diuresis held for the last few days and put on fluid restriction  - Would like to monitor weights and I/Os  on just fluid restriction. May need lasix resumed      COPD   - Scheduled duo-nebs   - Start symbicort      Demand ischemia   - continue ASA, statin, metoprolol      HTN   - continue isordil and hydralazine  - lisinopril changed to losartan and metoprolol resumed     DM    - continue to adjust as needed; will likely need to hold metformin and glipizide on discharge until stable kidney function but may benefit from managing DM with insulin alone      Obesity   - Complicates all aspects of care      Dispo; likely rehab in 2-3 days pending renal function and fluid balance       CODE STATUS:   Code Status and Medical Interventions:   Ordered at: 02/26/19 1233     Level Of Support Discussed With:    Health Care Surrogate     Code Status:    CPR     Medical Interventions (Level of Support Prior to Arrest):    Full     Comments:    per daughter and living will.  Please verify above with patient and daughter.         Electronically signed by Lisset Vigil DO, 03/02/19, 1:14 PM.

## 2019-03-02 NOTE — THERAPY TREATMENT NOTE
Acute Care - Physical Therapy Treatment Note  [facility]     Patient Name: Lico Diego  : 1935  MRN: [id number]  Today's Date: 3/2/2019  Onset of Illness/Injury or Date of Surgery: 19  Date of Referral to PT: 19  Referring Physician: Dr. Duarte     Admit Date: 2019    Visit Dx:    ICD-10-CM ICD-9-CM   1. Fall in home, initial encounter W19.XXXA E888.9    Y92.009 E849.0   2. Hypotension due to hypovolemia I95.89 458.8    E86.1 276.52   3. Volume depletion E86.9 276.50   4. Gastroenteritis K52.9 558.9   5. Acute renal failure superimposed on chronic kidney disease, unspecified CKD stage, unspecified acute renal failure type (CMS/[facility]) N17.9 584.9    N18.9 585.9   6. Closed fracture of right ankle, initial encounter S82.891A 824.8   7. Bimalleolar ankle fracture, right, closed, initial encounter S82.841A 824.4   8. Syndesmotic disruption of right ankle, initial encounter S93.431A 845.03   9. Impaired functional mobility, balance, gait, and endurance Z74.09 V49.89   10. Impaired mobility and ADLs Z74.09 799.89   11. Acute on chronic systolic congestive heart failure (CMS/[facility]) I50.23 428.23     428.0   12. Coronary artery disease involving native coronary artery of native heart with angina pectoris (CMS/HCC) I25.119 414.01     413.9     Patient Active Problem List   Diagnosis   • Fall at home   • CKD (chronic kidney disease) stage 3, GFR 30-59 ml/min (CMS/[facility])   • Diarrhea of presumed infectious origin   • Bilious vomiting with nausea   • Ischemic cardiomyopathy   • Acute on chronic systolic congestive heart failure (CMS/[facility])   • Severe obesity (BMI 35.0-35.9 with comorbidity) (CMS/[facility])   • Type 2 diabetes mellitus with diabetic polyneuropathy, with long-term current use of insulin (CMS/[facility])   • COPD (chronic obstructive pulmonary disease) (CMS/[facility])   • Essential hypertension   • Bimalleolar ankle fracture, right, closed, initial encounter   • Syndesmotic disruption of right ankle   •  Coronary artery disease involving native coronary artery of native heart with angina pectoris (CMS/HCC)   • Acute respiratory failure with hypoxia (CMS/HCC)   • Demand ischemia (CMS/HCC)   • JC (obstructive sleep apnea)   • Premature ventricular contractions (PVCs) (VPCs)       Therapy Treatment    Rehabilitation Treatment Summary     Row Name 03/02/19 1114             Treatment Time/Intention    Discipline  physical therapist  -AA      Document Type  therapy note (daily note)  -AA      Subjective Information  no complaints  -AA      Mode of Treatment  physical therapy  -AA      Patient/Family Observations  family present, pt in bed  -AA      Care Plan Review  care plan/treatment goals reviewed  -AA      Care Plan Review, Other Participant(s)  family  -AA      Patient Effort  good  -AA      Existing Precautions/Restrictions  fall;non-weight bearing;oxygen therapy device and L/min;other (see comments) RLE WBAT during transfer only, otherwise NWB  -AA      Recorded by [AA] Katharine Waite, PT 03/02/19 1321      Row Name 03/02/19 1114             Vital Signs    Post Systolic BP Rehab  154  -AA      Post Treatment Diastolic BP  75  -AA      Pre SpO2 (%)  92  -AA      O2 Delivery Pre Treatment  supplemental O2  -AA      Intra SpO2 (%)  88  -AA      O2 Delivery Intra Treatment  supplemental O2  -AA      Post SpO2 (%)  91  -AA      O2 Delivery Post Treatment  supplemental O2  -AA      Pre Patient Position  Supine  -AA      Intra Patient Position  Standing  -AA      Post Patient Position  Sitting  -AA      Recorded by [AA] Katharine Waite, PT 03/02/19 1321      Row Name 03/02/19 1114             Cognitive Assessment/Intervention    Additional Documentation  Cognitive Assessment/Intervention (Group)  -AA      Recorded by [AA] Katharine Waite, PT 03/02/19 1321      Row Name 03/02/19 1114             Cognitive Assessment/Intervention- PT/OT    Affect/Mental Status (Cognitive)  WFL  -AA      Orientation Status (Cognition)   oriented x 4  -AA      Follows Commands (Cognition)  follows one step commands;over 90% accuracy  -AA      Cognitive Function (Cognitive)  safety deficit  -AA      Safety Deficit (Cognitive)  impulsivity;safety precautions awareness;safety precautions follow-through/compliance  -AA      Personal Safety Interventions  fall prevention program maintained  -AA      Recorded by [AA] Ravindra April KAREN, PT 03/02/19 1321      Row Name 03/02/19 1114             Safety Issues, Functional Mobility    Safety Issues Affecting Function (Mobility)  impulsivity;awareness of need for assistance;safety precaution awareness;safety precautions follow-through/compliance;sequencing abilities  -AA      Impairments Affecting Function (Mobility)  balance;endurance/activity tolerance;shortness of breath  -AA      Recorded by [AA] Katharine Waite, PT 03/02/19 1321      Row Name 03/02/19 1114             Mobility Assessment/Intervention    Extremity Weight-bearing Status  right lower extremity  -AA      Right Lower Extremity (Weight-bearing Status)  non weight-bearing (NWB);other (see comments) WBAT during transfers  -AA      Recorded by [AA] Katharine Waite, PT 03/02/19 1321      Row Name 03/02/19 1114             Bed Mobility Assessment/Treatment    Bed Mobility Assessment/Treatment  supine-sit  -AA      Scooting/Bridging [place] (Bed Mobility)  contact guard;verbal cues  -AA      Bed Mobility, Safety Issues  decreased use of legs for bridging/pushing  -AA      Assistive Device (Bed Mobility)  head of bed elevated;bed rails  -AA      Comment (Bed Mobility)  VC for proper sequencing with pt demosntrate good ability to follow cues  -AA      Recorded by [AA] Katharine Waite, PT 03/02/19 1321      Row Name 03/02/19 1114             Transfer Assessment/Treatment    Transfer Assessment/Treatment  sit-stand transfer;stand pivot/stand step transfer  -AA      Maintains Weight-bearing Status (Transfers)  able to maintain  -AA      Comment (Transfers)   VC for pushing off bed with BUE to stand  -AA      Recorded by [AA] Katharine Waite, PT 03/02/19 1321      Row Name 03/02/19 1114             Bed-Chair Transfer    Bed-Chair [place] (Transfers)  minimum assist (75% patient effort);2 person assist;verbal cues  -AA      Assistive Device (Bed-Chair Transfers)  walker, front-wheeled  -AA      Recorded by [AA] Katharine Waite, PT 03/02/19 1321      Row Name 03/02/19 1114             Sit-Stand Transfer    Sit-Stand [place] (Transfers)  minimum assist (75% patient effort);verbal cues;2 person assist  -AA      Assistive Device (Sit-Stand Transfers)  walker, front-wheeled  -AA      Recorded by [AA] Katharine Waite, PT 03/02/19 1321      Row Name 03/02/19 1114             Stand-Sit Transfer    Stand-Sit [place] (Transfers)  minimum assist (75% patient effort);verbal cues  -AA      Assistive Device (Stand-Sit Transfers)  walker, front-wheeled  -AA      Recorded by [AA] Katharine Waite, PT 03/02/19 1321      Row Name 03/02/19 1114             Stand Pivot/Stand Step Transfer    Stand Pivot/Stand Step [place]  minimum assist (75% patient effort);verbal cues;2 person assist  -AA      Assistive Device (Stand Pivot Stand Step Transfer)  walker, front-wheeled  -AA      Recorded by [AA] Katharine Waite, PT 03/02/19 1321      Row Name 03/02/19 1114             Motor Skills Assessment/Interventions    Additional Documentation  Therapeutic Exercise (Group);Therapeutic Exercise Interventions (Group)  -AA      Recorded by [AA] Katharine Waite, PT 03/02/19 1321      Row Name 03/02/19 1114             Therapeutic Exercise    Additional Documentation  Therapeutic Exercise (Row)  -AA      55726 - PT Therapeutic Exercise Minutes  10  -AA      01819 - PT Therapeutic Activity Minutes  24  -AA      Recorded by [AA] Katharine Waite, PT 03/02/19 1321      Row Name 03/02/19 1114             Therapeutic Exercise    Lower Extremity (Therapeutic Exercise)  gluteal sets;quad sets,  bilateral;SLR (straight leg raise), bilateral  -AA      Lower Extremity Range of Motion (Therapeutic Exercise)  hip flexion/extension, bilateral;hip abduction/adduction, bilateral;knee flexion/extension, bilateral;ankle dorsiflexion/plantar flexion, left  -AA      Exercise Type (Therapeutic Exercise)  AROM (active range of motion)  -AA      Position (Therapeutic Exercise)  seated;supine  -AA      Sets/Reps (Therapeutic Exercise)  10  -AA      Recorded by [AA] Katharine Waite, PT 03/02/19 1321      Row Name 03/02/19 1114             Balance    Balance  static sitting balance;dynamic standing balance;static standing balance  -AA      Recorded by [AA] Katharine Waite, PT 03/02/19 1321      Row Name 03/02/19 1114             Static Sitting Balance    Level of Left Hand (Unsupported Sitting, Static Balance)  supervision  -AA      Sitting Position (Unsupported Sitting, Static Balance)  sitting on edge of bed  -AA      Time Able to Maintain Position (Unsupported Sitting, Static Balance)  more than 5 minutes  -AA      Recorded by [AA] Katharine Waite, PT 03/02/19 1321      Row Name 03/02/19 1114             Static Standing Balance    Level of Left Hand (Supported Standing, Static Balance)  contact guard assist  -AA      Time Able to Maintain Position (Supported Standing, Static Balance)  45 to 60 seconds  -AA      Assistive Device Utilized (Supported Standing, Static Balance)  walker, rolling  -AA      Recorded by [AA] Katharine Waite, PT 03/02/19 1321      Row Name 03/02/19 1114             Dynamic Standing Balance    Level of Left Hand, Reaches Outside Midline (Standing, Dynamic Balance)  minimal assist, 75% patient effort  -AA      Time Able to Maintain Position, Reaches Outside Midline (Standing, Dynamic Balance)  15 to 30 seconds  -AA      Assistive Device Utilized (Supported Standing, Dynamic Balance)  walker, rolling  -AA      Recorded by [AA] Katharine Waite, PT 03/02/19 1321      Row Name 03/02/19 1114              Positioning and Restraints    Pre-Treatment Position  in bed  -AA      Post Treatment Position  chair  -AA      In Chair  notified nsg;reclined;call light within reach;encouraged to call for assist;exit alarm on;with family/caregiver;with other staff;on mechanical lift sling  -AA2      Recorded by [AA] Ravindra April KAREN, PT 03/02/19 1321  [AA2] Ravindra April L, PT 03/02/19 1323      Row Name 03/02/19 1114             Pain Assessment    Additional Documentation  Pain Scale: Numbers Pre/Post-Treatment (Group)  -AA      Recorded by [AA] Ravindra April KAREN, PT 03/02/19 1321      Row Name 03/02/19 1114             Pain Scale: Numbers Pre/Post-Treatment    Pain Scale: Numbers, Pretreatment  0/10 - no pain  -AA      Pain Scale: Numbers, Post-Treatment  0/10 - no pain  -AA      Recorded by [AA] Ravindra April KAREN, PT 03/02/19 1321      Row Name                Wound 02/23/19 1529 Right leg incision    Wound - Properties Group Date first assessed: 02/23/19 [TB] Time first assessed: 1529 [TB] Side: Right [TB] Location: leg [TB] Type: incision [TB] Recorded by:  [TB] Fabienne Calderon, RN 02/23/19 1529    Row Name 03/02/19 1114             Coping    Observed Emotional State  accepting;calm;cooperative  -AA      Verbalized Emotional State  acceptance  -AA      Recorded by [AA] Ravindra April L, PT 03/02/19 1321      Row Name 03/02/19 1114             Plan of Care Review    Plan of Care Reviewed With  patient;family  -AA      Recorded by [AA] Ravindra April L, PT 03/02/19 1321      Row Name 03/02/19 1114             Outcome Summary/Treatment Plan (PT)    Daily Summary of Progress (PT)  progress toward functional goals is good  -AA      Anticipated Discharge Disposition (PT)  inpatient rehabilitation facility  -AA      Recorded by [AA] Ravindra April KAREN, PT 03/02/19 1321        User Key  (r) = Recorded By, (t) = Taken By, (c) = Cosigned By    Initials Name Effective Dates Discipline    TB Fabienne Calderon, RN 06/16/16 -  Nurse    CHANTELL Waite April  L, PT 04/02/18 -  PT          Wound 02/23/19 1529 Right leg incision (Active)   Dressing Appearance dry;intact;no drainage 3/2/2019  8:00 AM   Closure ALEXEI 3/2/2019  8:00 AM   Base dressing in place, unable to visualize 3/2/2019  8:00 AM           Physical Therapy Education     Title: PT OT SLP Therapies (In Progress)     Topic: Physical Therapy (In Progress)     Point: Mobility training (In Progress)     Learning Progress Summary           Patient Eager, E, NR by CHANTELL at 3/2/2019  1:21 PM    Acceptance, E, VU,NR by DIANA at 3/1/2019  9:32 AM    Acceptance, E, VU,NR by DIANA at 2/28/2019 11:35 AM    Acceptance, E,D, NR by MARK at 2/27/2019  9:10 AM    Acceptance, E,TB, VU by  at 2/25/2019  4:51 PM    Acceptance, E,D, VU,NR by NICKIE at 2/24/2019 10:19 AM    Comment:  Reviewed benefits of activity, transfer training, weight-bearing status, progression of POC.   Family Eager, E, NR by CHANTELL at 3/2/2019  1:21 PM    Acceptance, E,TB, VU by  at 2/25/2019  4:51 PM    Acceptance, E,D, VU,NR by NICKIE at 2/24/2019 10:19 AM    Comment:  Reviewed benefits of activity, transfer training, weight-bearing status, progression of POC.                   Point: Home exercise program (Done)     Learning Progress Summary           Patient Acceptance, E, VU,NR by DIANA at 3/1/2019  9:32 AM    Acceptance, E, VU,NR by DIANA at 2/28/2019 11:35 AM    Acceptance, E,D, NR by MARK at 2/27/2019  9:10 AM    Acceptance, E,TB, VU by  at 2/25/2019  4:51 PM    Acceptance, E,D, VU,NR by NICKIE at 2/24/2019 10:19 AM    Comment:  Reviewed benefits of activity, transfer training, weight-bearing status, progression of POC.   Family Acceptance, E,TB, VU by  at 2/25/2019  4:51 PM    Acceptance, E,D, VU,NR by NICKIE at 2/24/2019 10:19 AM    Comment:  Reviewed benefits of activity, transfer training, weight-bearing status, progression of POC.                   Point: Body mechanics (In Progress)     Learning Progress Summary           Patient ASHLEY Johnson, NR by AA at 3/2/2019  1:21 PM     Acceptance, E, VU,NR by DIANA at 3/1/2019  9:32 AM    Acceptance, E, VU,NR by DIANA at 2/28/2019 11:35 AM    Acceptance, E,D, NR by MARK at 2/27/2019  9:10 AM    Acceptance, E,TB, VU by  at 2/25/2019  4:51 PM    Acceptance, E,D, VU,NR by NICKIE at 2/24/2019 10:19 AM    Comment:  Reviewed benefits of activity, transfer training, weight-bearing status, progression of POC.   Family Eager, E, NR by AA at 3/2/2019  1:21 PM    Acceptance, E,TB, VU by  at 2/25/2019  4:51 PM    Acceptance, E,D, VU,NR by NICKIE at 2/24/2019 10:19 AM    Comment:  Reviewed benefits of activity, transfer training, weight-bearing status, progression of POC.                   Point: Precautions (In Progress)     Learning Progress Summary           Patient Eager, E, NR by AA at 3/2/2019  1:21 PM    Acceptance, E, VU,NR by DIANA at 3/1/2019  9:32 AM    Acceptance, E, VU,NR by DIANA at 2/28/2019 11:35 AM    Acceptance, E,D, NR by MARK at 2/27/2019  9:10 AM    Acceptance, E,TB, VU by  at 2/25/2019  4:51 PM    Acceptance, E,D, VU,NR by NICKIE at 2/24/2019 10:19 AM    Comment:  Reviewed benefits of activity, transfer training, weight-bearing status, progression of POC.   Family Eager, E, NR by CHANTELL at 3/2/2019  1:21 PM    Acceptance, E,TB, VU by  at 2/25/2019  4:51 PM    Acceptance, E,D, VU,NR by NICKIE at 2/24/2019 10:19 AM    Comment:  Reviewed benefits of activity, transfer training, weight-bearing status, progression of POC.                               User Key     Initials Effective Dates Name Provider Type Discipline     11/20/18 -  Natasha García, PT Physical Therapist PT     06/19/15 -  Francoise Johnson, PT Physical Therapist PT    JR 02/06/17 -  Aleksandr Constantino, RN Registered Nurse Nurse    NICKIE 04/03/18 -  Noemi Castro, PT Physical Therapist PT     04/02/18 -  Ravindra, April L, PT Physical Therapist PT                PT Recommendation and Plan  Anticipated Discharge Disposition (PT): inpatient rehabilitation facility  Outcome Summary/Treatment Plan  (PT)  Daily Summary of Progress (PT): progress toward functional goals is good  Anticipated Discharge Disposition (PT): inpatient rehabilitation facility  Plan of Care Reviewed With: patient, family  Progress: improving  Outcome Summary: Pt improve with ability to follow commands and transfer this date.  Pt require VC for sequencing during transfers.  Min A of 2 for sit to stand and SPT to chair from bed with RW.  Pt reports planning DC Monday to [facility].  Educated on NWB RLE except WBAT during transfers.  Pt UIC with sling and SRNA in room.  Outcome Measures     Row Name 03/02/19 1114 03/01/19 0932 02/28/19 1136       How much help from another person do you currently need...    Turning from your back to your side while in flat bed without using bedrails?  3  -AA  3  -EJ  --    Moving from lying on back to sitting on the side of a flat bed without bedrails?  3  -AA  3  -EJ  --    Moving to and from a bed to a chair (including a wheelchair)?  3  -AA  3  -EJ  --    Standing up from a chair using your arms (e.g., wheelchair, bedside chair)?  2  -AA  2  -EJ  --    Climbing 3-5 steps with a railing?  1  -AA  1  -EJ  --    To walk in hospital room?  1  -AA  1  -EJ  --    AM-PAC 6 Clicks Score  13  -AA  13  -EJ  --       How much help from another is currently needed...    Putting on and taking off regular lower body clothing?  --  --  1  -AR    Bathing (including washing, rinsing, and drying)  --  --  2  -AR    Toileting (which includes using toilet bed pan or urinal)  --  --  2  -AR    Putting on and taking off regular upper body clothing  --  --  3  -AR    Taking care of personal grooming (such as brushing teeth)  --  --  3  -AR    Eating meals  --  --  3  -AR    Score  --  --  14  -AR       Functional Assessment    Outcome Measure Options  AM-PAC 6 Clicks Basic Mobility (PT)  -AA  AM-PAC 6 Clicks Basic Mobility (PT)  -EJ  AM-PAC 6 Clicks Daily Activity (OT)  -AR    Row Name 02/28/19 1135             How much  help from another person do you currently need...    Turning from your back to your side while in flat bed without using bedrails?  2  -EJ      Moving from lying on back to sitting on the side of a flat bed without bedrails?  2  -EJ      Moving to and from a bed to a chair (including a wheelchair)?  2  -EJ      Standing up from a chair using your arms (e.g., wheelchair, bedside chair)?  2  -EJ      Climbing 3-5 steps with a railing?  1  -EJ      To walk in hospital room?  1  -EJ      AM-PAC 6 Clicks Score  10  -EJ         Functional Assessment    Outcome Measure Options  AM-PAC 6 Clicks Basic Mobility (PT)  -EJ        User Key  (r) = Recorded By, (t) = Taken By, (c) = Cosigned By    Initials Name Provider Type     Natasha García, PT Physical Therapist    Barbara Tran, OT Occupational Therapist    Katharine Schultz, PT Physical Therapist         Time Calculation:   PT Charges     Row Name 03/02/19 1114             Time Calculation    Start Time  1114  -AA      PT Received On  03/02/19  -      PT Goal Re-Cert Due Date  03/09/19  -         Time Calculation- PT    Total Timed Code Minutes- PT  34 minute(s)  -         Timed Charges    19480 - PT Therapeutic Exercise Minutes  10  -AA      71184 - PT Therapeutic Activity Minutes  24  -AA        User Key  (r) = Recorded By, (t) = Taken By, (c) = Cosigned By    Initials Name Provider Type    Katharine Schultz, PT Physical Therapist        Therapy Suggested Charges     Code   Minutes Charges    58165 (CPT®)  Pt Neuromusc Re Education Ea 15 Min      35077 (CPT®) Hc Pt Ther Proc Ea 15 Min 10 1    81483 (CPT®) Hc Gait Training Ea 15 Min      00966 (CPT®) Hc Pt Therapeutic Act Ea 15 Min 24 1    59377 (CPT®) Hc Pt Manual Therapy Ea 15 Min      68284 (CPT®) Hc Pt Iontophoresis Ea 15 Min      41304 (CPT®) Hc Pt Elec Stim Ea-Per 15 Min      42534 (CPT®) Hc Pt Ultrasound Ea 15 Min      76369 (CPT®) Hc Pt Self Care/Mgmt/Train Ea 15 Min      53174 (CPT®) Hc Pt  Prosthetic (S) Train Initial Encounter, Each 15 Min      09206 (CPT®) Hc Pt Orthotic(S)/Prosthetic(S) Encounter, Each 15 Min      29511 (CPT®) Hc Orthotic(S) Mgmt/Train Initial Encounter, Each 15min      Total  34 2        Therapy Charges for Today     Code Description Service Date Service Provider Modifiers Qty    85753641466 HC PT THER PROC EA 15 MIN 3/2/2019 Katharine Waite, PT GP 1    04563867452 HC PT THERAPEUTIC ACT EA 15 MIN 3/2/2019 Katharine Waite, PT GP 1          PT G-Codes  Outcome Measure Options: AM-PAC 6 Clicks Basic Mobility (PT)  AM-PAC 6 Clicks Score: 13  Score: 14    Katharine Waite PT  3/2/2019

## 2019-03-02 NOTE — PROGRESS NOTES
"   LOS: 8 days    Patient Care Team:  Khai Menjivar DO as PCP - General (Family Medicine)  Ti Santiago MD as Consulting Physician (Interventional Cardiology)    Subjective     No acute events overnight. No new complaints.     Objective     Vital Signs:  Blood pressure 159/75, pulse 78, temperature 97.3 °F (36.3 °C), temperature source Oral, resp. rate 16, height 180.3 cm (71\"), weight 126 kg (278 lb 4.8 oz), SpO2 92 %.    Flowsheet Rows      First Filed Value   Admission Height  180.3 cm (71\") Documented at 02/22/2019 1200   Admission Weight  119 kg (263 lb) Documented at 02/22/2019 1200          03/01 0701 - 03/02 0700  In: 460 [P.O.:460]  Out: 2100 [Urine:2100]    Physical Exam:    General Appearance: NAD WD obese WM  Neuro:   awake alert   Psych:  Normal mood and affect  CV:  RRR,  no edema  Lungs:  Mild increased WOB,  + rhonchi  Abdomen: not distended, bowel sounds present  :  No mcdonald, no palp bladder  Skin: stasis dermatitis left lower ext, right in bandage, Warm and dry      Labs:  Results from last 7 days   Lab Units 02/28/19  0535 02/27/19  0337 02/26/19  0014   WBC 10*3/mm3 8.45 7.00 7.02   HEMOGLOBIN g/dL 9.3* 9.2* 9.7*   PLATELETS 10*3/mm3 244 229 183     Results from last 7 days   Lab Units 03/02/19  0651 03/01/19  0600 02/28/19  1613 02/28/19  0535 02/27/19  0337 02/26/19  0951  02/26/19  0016  02/25/19  0524 02/24/19  0723   SODIUM mmol/L 133 128* 128* 130* 132  --    < > 133   < > 133 131*   POTASSIUM mmol/L 4.4 4.1 4.5 3.9 4.1  --    < > 4.3   < > 5.2 5.0   CHLORIDE mmol/L 100 97* 94* 97* 99  --    < > 101   < > 105 105   CO2 mmol/L 25.0 24.0 26.0 24.0 24.0  --    < > 24.0   < > 20.0 19.0*   BUN mg/dL 56* 48* 46* 37* 40*  --    < > 44*   < > 44* 54*   CREATININE mg/dL 1.74* 1.80* 1.92* 1.50* 1.59*  --    < > 1.69*   < > 1.97* 2.32*   CALCIUM mg/dL 8.3* 8.1* 8.2* 8.3* 8.6*  --    < > 8.6*   < > 8.2* 7.5*   PHOSPHORUS mg/dL  --   --   --   --  2.6 2.3*  --   --   --  3.5 4.2   MAGNESIUM " mg/dL  --   --   --  2.2 2.2 1.8  --  2.0  --   --   --    ALBUMIN g/dL  --   --   --   --  3.67  --   --  3.76  --  3.71 3.35    < > = values in this interval not displayed.     Results from last 7 days   Lab Units 02/27/19  0337   ALK PHOS U/L 67   BILIRUBIN mg/dL 0.9   ALT (SGPT) U/L 14   AST (SGOT) U/L 24     Results from last 7 days   Lab Units 02/26/19  0016   PH, ARTERIAL pH units 7.356   PO2 ART mm Hg 60.5*   PCO2, ARTERIAL mm Hg 41.1   HCO3 ART mmol/L 23.0     Results from last 7 days   Lab Units 02/28/19  2131   COLOR UA  Yellow   CLARITY UA  Clear   PH, URINE  <=5.0   SPECIFIC GRAVITY, URINE  1.020   GLUCOSE UA  Negative   KETONES UA  Negative   BILIRUBIN UA  Negative   PROTEIN UA  Negative   BLOOD UA  Negative   LEUKOCYTES UA  Negative   NITRITE UA  Negative       Estimated Creatinine Clearance: 44.3 mL/min (A) (by C-G formula based on SCr of 1.74 mg/dL (H)).         A/P:    ARF:  Non-oliguric  secondary to decreased IVV, vomiting,Slighty better after discontinuing diuresis - Improving.     CKD3:  secondary to nephrosclerosis.  Baseline 1.7-1.8    HTN - Controlled    Anemia - iron def.  Getting IVFe    Hyponatremia: 133 imporving.      Plan:  Fluid restriction   Monitor I/o   Avoid nephrotoxic agents.   Renal diet.     Case discussed with Family members and RN.     Della Santillan MD  03/02/19  1:11 PM

## 2019-03-02 NOTE — PLAN OF CARE
Problem: Patient Care Overview  Goal: Plan of Care Review  Outcome: Ongoing (interventions implemented as appropriate)   03/02/19 8085   Coping/Psychosocial   Plan of Care Reviewed With patient   Plan of Care Review   Progress improving   OTHER   Outcome Summary VSS, no complaints of pain. Worked well with PT and sat in chair for most of day. Plan is to go to [facility] on Monday. Continue to monitor.    Coping/Psychosocial   Patient Agreement with Plan of Care agrees

## 2019-03-02 NOTE — PLAN OF CARE
Problem: Fall Risk (Adult)  Goal: Identify Related Risk Factors and Signs and Symptoms  Outcome: Ongoing (interventions implemented as appropriate)    Goal: Absence of Fall  Outcome: Ongoing (interventions implemented as appropriate)      Problem: Skin Injury Risk (Adult)  Goal: Identify Related Risk Factors and Signs and Symptoms  Outcome: Ongoing (interventions implemented as appropriate)    Goal: Skin Health and Integrity  Outcome: Ongoing (interventions implemented as appropriate)      Problem: Patient Care Overview  Goal: Plan of Care Review  Outcome: Ongoing (interventions implemented as appropriate)   03/02/19 0216   Coping/Psychosocial   Plan of Care Reviewed With patient   Plan of Care Review   Progress improving   OTHER   Outcome Summary PT rested well. VSS. Wore cpap through the night. No new complaints. Up to [facility] with assist x 2. HOpes to go to rehab Monday.      Goal: Individualization and Mutuality  Outcome: Ongoing (interventions implemented as appropriate)    Goal: Discharge Needs Assessment  Outcome: Ongoing (interventions implemented as appropriate)    Goal: Interprofessional Rounds/Family Conf  Outcome: Ongoing (interventions implemented as appropriate)      Problem: Fracture Orthopaedic (Adult)  Goal: Signs and Symptoms of Listed Potential Problems Will be Absent, Minimized or Managed (Fracture Orthopaedic)  Outcome: Ongoing (interventions implemented as appropriate)      Problem: Palliative Care (Adult)  Goal: Identify Related Risk Factors and Signs and Symptoms  Outcome: Ongoing (interventions implemented as appropriate)    Goal: Maximized Comfort  Outcome: Ongoing (interventions implemented as appropriate)    Goal: Enhanced Quality of Life  Outcome: Ongoing (interventions implemented as appropriate)

## 2019-03-02 NOTE — PLAN OF CARE
Problem: Patient Care Overview  Goal: Plan of Care Review  Outcome: Ongoing (interventions implemented as appropriate)   03/02/19 1114   Coping/Psychosocial   Plan of Care Reviewed With patient;family   Plan of Care Review   Progress improving   OTHER   Outcome Summary Pt improve with ability to follow commands and transfer this date. Pt require VC for sequencing during transfers. Min A of 2 for sit to stand and SPT to chair from bed with RW. Pt reports planning DC Monday to [facility]. Educated on NWB RLE except WBAT during transfers. Pt UIC with sling and SRNA in room.   Coping/Psychosocial   Patient Agreement with Plan of Care agrees

## 2019-03-03 LAB
ANION GAP SERPL CALCULATED.3IONS-SCNC: 7 MMOL/L (ref 3–11)
BUN BLD-MCNC: 53 MG/DL (ref 9–23)
BUN/CREAT SERPL: 31.5 (ref 7–25)
CALCIUM SPEC-SCNC: 8.4 MG/DL (ref 8.7–10.4)
CHLORIDE SERPL-SCNC: 99 MMOL/L (ref 99–109)
CO2 SERPL-SCNC: 27 MMOL/L (ref 20–31)
CREAT BLD-MCNC: 1.68 MG/DL (ref 0.6–1.3)
GFR SERPL CREATININE-BSD FRML MDRD: 39 ML/MIN/1.73
GLUCOSE BLD-MCNC: 188 MG/DL (ref 70–100)
GLUCOSE BLDC GLUCOMTR-MCNC: 160 MG/DL (ref 70–130)
GLUCOSE BLDC GLUCOMTR-MCNC: 180 MG/DL (ref 70–130)
GLUCOSE BLDC GLUCOMTR-MCNC: 182 MG/DL (ref 70–130)
GLUCOSE BLDC GLUCOMTR-MCNC: 273 MG/DL (ref 70–130)
POTASSIUM BLD-SCNC: 4.8 MMOL/L (ref 3.5–5.5)
SODIUM BLD-SCNC: 133 MMOL/L (ref 132–146)

## 2019-03-03 PROCEDURE — 94799 UNLISTED PULMONARY SVC/PX: CPT

## 2019-03-03 PROCEDURE — 97530 THERAPEUTIC ACTIVITIES: CPT

## 2019-03-03 PROCEDURE — 63710000001 INSULIN DETEMIR PER 5 UNITS: Performed by: INTERNAL MEDICINE

## 2019-03-03 PROCEDURE — 97110 THERAPEUTIC EXERCISES: CPT

## 2019-03-03 PROCEDURE — 99232 SBSQ HOSP IP/OBS MODERATE 35: CPT | Performed by: INTERNAL MEDICINE

## 2019-03-03 PROCEDURE — 82962 GLUCOSE BLOOD TEST: CPT

## 2019-03-03 PROCEDURE — 80048 BASIC METABOLIC PNL TOTAL CA: CPT | Performed by: INTERNAL MEDICINE

## 2019-03-03 PROCEDURE — 25010000002 ENOXAPARIN PER 10 MG: Performed by: ORTHOPAEDIC SURGERY

## 2019-03-03 RX ORDER — FUROSEMIDE 20 MG/1
20 TABLET ORAL DAILY
Status: DISCONTINUED | OUTPATIENT
Start: 2019-03-03 | End: 2019-03-04 | Stop reason: HOSPADM

## 2019-03-03 RX ADMIN — INSULIN LISPRO 2 UNITS: 100 INJECTION, SOLUTION INTRAVENOUS; SUBCUTANEOUS at 18:34

## 2019-03-03 RX ADMIN — INSULIN DETEMIR 18 UNITS: 100 INJECTION, SOLUTION SUBCUTANEOUS at 22:32

## 2019-03-03 RX ADMIN — ATORVASTATIN CALCIUM 10 MG: 10 TABLET, FILM COATED ORAL at 09:13

## 2019-03-03 RX ADMIN — INSULIN LISPRO 2 UNITS: 100 INJECTION, SOLUTION INTRAVENOUS; SUBCUTANEOUS at 22:24

## 2019-03-03 RX ADMIN — IPRATROPIUM BROMIDE AND ALBUTEROL SULFATE 3 ML: 2.5; .5 SOLUTION RESPIRATORY (INHALATION) at 09:18

## 2019-03-03 RX ADMIN — BUDESONIDE AND FORMOTEROL FUMARATE DIHYDRATE 2 PUFF: 80; 4.5 AEROSOL RESPIRATORY (INHALATION) at 09:18

## 2019-03-03 RX ADMIN — IPRATROPIUM BROMIDE AND ALBUTEROL SULFATE 3 ML: 2.5; .5 SOLUTION RESPIRATORY (INHALATION) at 15:48

## 2019-03-03 RX ADMIN — FUROSEMIDE 20 MG: 20 TABLET ORAL at 09:14

## 2019-03-03 RX ADMIN — ASPIRIN 81 MG: 81 TABLET, COATED ORAL at 09:14

## 2019-03-03 RX ADMIN — SODIUM CHLORIDE, PRESERVATIVE FREE 3 ML: 5 INJECTION INTRAVENOUS at 09:13

## 2019-03-03 RX ADMIN — ENOXAPARIN SODIUM 30 MG: 30 INJECTION SUBCUTANEOUS at 09:13

## 2019-03-03 RX ADMIN — ALLOPURINOL 100 MG: 100 TABLET ORAL at 09:13

## 2019-03-03 RX ADMIN — DOCUSATE SODIUM 100 MG: 100 CAPSULE, LIQUID FILLED ORAL at 09:14

## 2019-03-03 RX ADMIN — SODIUM CHLORIDE, PRESERVATIVE FREE 3 ML: 5 INJECTION INTRAVENOUS at 22:27

## 2019-03-03 RX ADMIN — INSULIN LISPRO 2 UNITS: 100 INJECTION, SOLUTION INTRAVENOUS; SUBCUTANEOUS at 09:04

## 2019-03-03 RX ADMIN — SODIUM CHLORIDE, PRESERVATIVE FREE 3 ML: 5 INJECTION INTRAVENOUS at 22:25

## 2019-03-03 RX ADMIN — HYDROCODONE BITARTRATE AND ACETAMINOPHEN 1 TABLET: 5; 325 TABLET ORAL at 22:24

## 2019-03-03 RX ADMIN — LOSARTAN POTASSIUM 25 MG: 25 TABLET, FILM COATED ORAL at 09:14

## 2019-03-03 RX ADMIN — INSULIN LISPRO 6 UNITS: 100 INJECTION, SOLUTION INTRAVENOUS; SUBCUTANEOUS at 13:59

## 2019-03-03 RX ADMIN — METOPROLOL SUCCINATE 100 MG: 100 TABLET, FILM COATED, EXTENDED RELEASE ORAL at 09:14

## 2019-03-03 RX ADMIN — CYANOCOBALAMIN TAB 1000 MCG 1000 MCG: 1000 TAB at 09:14

## 2019-03-03 RX ADMIN — IPRATROPIUM BROMIDE AND ALBUTEROL SULFATE 3 ML: 2.5; .5 SOLUTION RESPIRATORY (INHALATION) at 19:22

## 2019-03-03 RX ADMIN — CITALOPRAM HYDROBROMIDE 10 MG: 20 TABLET ORAL at 09:14

## 2019-03-03 RX ADMIN — Medication 5 MG: at 22:24

## 2019-03-03 RX ADMIN — BUDESONIDE AND FORMOTEROL FUMARATE DIHYDRATE 2 PUFF: 80; 4.5 AEROSOL RESPIRATORY (INHALATION) at 19:22

## 2019-03-03 RX ADMIN — IPRATROPIUM BROMIDE AND ALBUTEROL SULFATE 3 ML: 2.5; .5 SOLUTION RESPIRATORY (INHALATION) at 12:26

## 2019-03-03 NOTE — PROGRESS NOTES
[facility] Medicine Services  PROGRESS NOTE    Patient Name: Lico Diego  : 1935  MRN: [id number]    Date of Admission: 2019  Length of Stay: 9  Primary Care Physician: Khai Menjivar DO    Subjective   Subjective     CC:  Ankle fracture     HPI:  No acute events. States he is feeling ok. Cr improving and weight trended up. Discussed with patient and daughter that lasix 20 mg daily resumed.     Review of Systems  Gen- No fevers, chills  CV- No chest pain, palpitations  Resp- No cough, dyspnea  GI- No N/V/D, abd pain    Otherwise ROS is negative except as mentioned in the HPI.    Objective   Objective     Vital Signs:   Temp:  [97.3 °F (36.3 °C)-99.1 °F (37.3 °C)] 98.6 °F (37 °C)  Heart Rate:  [73-81] 81  Resp:  [16-20] 20  BP: (152-159)/(58-75) 157/62        Physical Exam:  Constitutional: No acute distress, awake, alert  HENT: NCAT, mucous membranes moist  Respiratory: Clear to auscultation bilaterally, respiratory effort normal; occ rhonchi   Cardiovascular: RRR, II/VI murmurs, no rubs, or gallops, palpable pedal pulses bilaterally  Gastrointestinal: Positive bowel sounds, soft, nontender, nondistended  Musculoskeletal: trace bilateral ankle edema  Psychiatric: Appropriate affect, cooperative  Neurologic: Oriented x 3, strength symmetric in all extremities, Cranial Nerves grossly intact to confrontation, speech clear  Skin: No rashes    Results Reviewed:  I have personally reviewed current lab, radiology, and data and agree.    Results from last 7 days   Lab Units 19  0535 19  0337 19  0014   WBC 10*3/mm3 8.45 7.00 7.02   HEMOGLOBIN g/dL 9.3* 9.2* 9.7*   HEMATOCRIT % 28.6* 28.5* 30.2*   PLATELETS 10*3/mm3 244 229 183     Results from last 7 days   Lab Units 19  0342 19  0651 19  0600  19  0337 19  0951 19  0424 19  0016   SODIUM mmol/L 133 133 128*   < > 132  --  132 133   POTASSIUM mmol/L 4.8 4.4 4.1   < >  4.1  --  4.2 4.3   CHLORIDE mmol/L 99 100 97*   < > 99  --  103 101   CO2 mmol/L 27.0 25.0 24.0   < > 24.0  --  22.0 24.0   BUN mg/dL 53* 56* 48*   < > 40*  --  41* 44*   CREATININE mg/dL 1.68* 1.74* 1.80*   < > 1.59*  --  1.60* 1.69*   GLUCOSE mg/dL 188* 132* 110*   < > 173*  --  159* 167*   CALCIUM mg/dL 8.4* 8.3* 8.1*   < > 8.6*  --  8.5* 8.6*   ALT (SGPT) U/L  --   --   --   --  14  --   --  18   AST (SGOT) U/L  --   --   --   --  24  --   --  28   TROPONIN I ng/mL  --   --   --   --   --  0.521* 0.631* 0.947*    < > = values in this interval not displayed.     Estimated Creatinine Clearance: 46 mL/min (A) (by C-G formula based on SCr of 1.68 mg/dL (H)).    No results found for: BNP    Microbiology Results Abnormal     None          Imaging Results (last 24 hours)     ** No results found for the last 24 hours. **          Results for orders placed during the hospital encounter of 02/22/19   Adult Transthoracic Echo Complete W/ Cont if Necessary Per Protocol    Narrative · Left ventricular systolic function is low normal. Estimated EF = 45%.   Difficult to accurately assess regional wall motion abnormality and EF due   to frequent ventricular ectopy.  · Left ventricular diastolic dysfunction (grade I) consistent with   impaired relaxation.  · Left atrial cavity size is mildly dilated.  · Mild mitral valve regurgitation is present  · Moderate tricuspid valve regurgitation is present.  · Estimated right ventricular systolic pressure from tricuspid   regurgitation is markedly elevated (64 mmHg).          I have reviewed the medications:    Current Facility-Administered Medications:   •  acetaminophen (TYLENOL) tablet 650 mg, 650 mg, Oral, Q4H PRN, Obi Anglin MD  •  albuterol (PROVENTIL) nebulizer solution 0.083% 2.5 mg/3mL, 2.5 mg, Nebulization, Q6H PRN, Obi Anglin MD, 2.5 mg at 02/27/19 0410  •  allopurinol (ZYLOPRIM) tablet 100 mg, 100 mg, Oral, Daily, Obi Anglin MD, 100  mg at 03/03/19 0913  •  aspirin EC tablet 81 mg, 81 mg, Oral, Daily, Obi Anglin MD, 81 mg at 03/03/19 0914  •  atorvastatin (LIPITOR) tablet 10 mg, 10 mg, Oral, Daily, Obi Anglin MD, 10 mg at 03/03/19 0913  •  bisacodyl (DULCOLAX) suppository 10 mg, 10 mg, Rectal, Daily PRN, Rajani Diamond, APRN  •  budesonide-formoterol (SYMBICORT) 80-4.5 MCG/ACT inhaler 2 puff, 2 puff, Inhalation, BID - RT, Lisset Vigil DO, 2 puff at 03/03/19 0918  •  citalopram (CeleXA) tablet 10 mg, 10 mg, Oral, Daily, Obi Anglin MD, 10 mg at 03/03/19 0914  •  docusate sodium (COLACE) capsule 100 mg, 100 mg, Oral, BID PRN, Obi Anglin MD, 100 mg at 03/01/19 1330  •  docusate sodium (COLACE) capsule 100 mg, 100 mg, Oral, Daily, Rajani Diamond APRN, 100 mg at 03/03/19 0914  •  enoxaparin (LOVENOX) syringe 30 mg, 30 mg, Subcutaneous, Daily, Ken Abreu MD, 30 mg at 03/03/19 0913  •  fluticasone (FLONASE) 50 MCG/ACT nasal spray 2 spray, 2 spray, Each Nare, Daily, Julio Saldaña MD, 2 spray at 03/02/19 0830  •  furosemide (LASIX) tablet 20 mg, 20 mg, Oral, Daily, Lisset Vigil DO, 20 mg at 03/03/19 0914  •  HYDROcodone-acetaminophen (NORCO) 5-325 MG per tablet 1 tablet, 1 tablet, Oral, Q6H PRN, Flip Brown MD, 1 tablet at 03/01/19 1830  •  insulin detemir (LEVEMIR) injection 18 Units, 18 Units, Subcutaneous, Nightly, Lisset Vigil DO, 18 Units at 03/02/19 2145  •  insulin lispro (humaLOG) injection 0-9 Units, 0-9 Units, Subcutaneous, 4x Daily With Meals & Nightly, Obi Anglin MD, 2 Units at 03/03/19 0904  •  insulin lispro (humaLOG) injection 7 Units, 7 Units, Subcutaneous, TID With Meals, Lisset Vigil DO, 7 Units at 03/03/19 0913  •  ipratropium-albuterol (DUO-NEB) nebulizer solution 3 mL, 3 mL, Nebulization, 4x Daily - RT, Tyshawn Nielson MD, 3 mL at 03/03/19 0918  •  losartan (COZAAR) tablet 25 mg, 25 mg, Oral, Q24H, Sanjay Govea  Khai PIZANO IV, MD, 25 mg at 03/03/19 0914  •  magnesium sulfate 4g/100mL (PREMIX) infusion, 4 g, Intravenous, PRN, Flip Brown MD, 4 g at 02/26/19 1513  •  melatonin sublingual tablet 5 mg, 5 mg, Sublingual, Nightly PRN, Obi Anglin MD  •  metoprolol succinate XL (TOPROL-XL) 24 hr tablet 100 mg, 100 mg, Oral, Daily, Sanjay Govea IV, MD, 100 mg at 03/03/19 0914  •  Morphine sulfate (PF) injection 1 mg, 1 mg, Intravenous, Q4H PRN **AND** [DISCONTINUED] naloxone (NARCAN) injection 0.4 mg, 0.4 mg, Intravenous, Q5 Min PRN, Obi Anglin MD  •  nitroglycerin (NITROSTAT) SL tablet 0.4 mg, 0.4 mg, Sublingual, Q5 Min PRN, Julio Saldaña MD, 0.4 mg at 02/25/19 0758  •  [DISCONTINUED] ondansetron (ZOFRAN) tablet 4 mg, 4 mg, Oral, Q6H PRN **OR** ondansetron (ZOFRAN) injection 4 mg, 4 mg, Intravenous, Q6H PRN, Obi Anglin MD, 4 mg at 02/28/19 0704  •  Pharmacy Consult - [facility], , Does not apply, Daily, Yancy Stovall, [facility], Stopped at 02/28/19 1013  •  Pharmacy Meds to Bed Consult, , Does not apply, Daily, Sanjay Govea IV, MD, Stopped at 03/01/19 0125  •  polyethylene glycol 3350 powder (packet), 17 g, Oral, BID PRN, Lisset Vigil, , 17 g at 03/01/19 1330  •  sodium chloride 0.9 % flush 10 mL, 10 mL, Intravenous, PRN, Kyle Mendoza MD  •  sodium chloride 0.9 % flush 3 mL, 3 mL, Intravenous, Q12H, Obi Anglin MD, 3 mL at 03/01/19 0900  •  sodium chloride 0.9 % flush 3 mL, 3 mL, Intravenous, Q12H, Ken Abreu MD, 3 mL at 03/03/19 0913  •  sodium chloride 0.9 % flush 3-10 mL, 3-10 mL, Intravenous, PRN, Ken Abreu MD  •  vitamin B-12 (CYANOCOBALAMIN) tablet 1,000 mcg, 1,000 mcg, Oral, Daily, Obi Anglin MD, 1,000 mcg at 03/03/19 0914      Assessment/Plan   Assessment / Plan     Active Hospital Problems    Diagnosis Date Noted   • **Bimalleolar ankle fracture, right, closed, initial encounter [J79.907O]  02/22/2019   • Premature ventricular contractions (PVCs) (VPCs) [I49.3] 02/27/2019   • JC (obstructive sleep apnea) [G47.33] 02/26/2019     cpap     • Acute respiratory failure with hypoxia (CMS/Formerly [facility]) [J96.01] 02/25/2019   • Coronary artery disease involving native coronary artery of native heart with angina pectoris (CMS/Formerly [facility]) [I25.119] 02/25/2019     · CABG (1998): LIMA to LAD, SVG to circumflex, SVG to RPDA, SVG to diagonal. Normal LVEF  · Cardiac cath (2003): 3/4 grafts patent. Normal LVEF.  Medical management  · PET stress (2013): Inferior infarct with ischemia. Medical management recommended     • Demand ischemia (CMS/[facility]) [I24.8] 02/25/2019   • CKD (chronic kidney disease) stage 3, GFR 30-59 ml/min (CMS/[facility]) [N18.3] 02/22/2019   • Ischemic cardiomyopathy [I25.5] 02/22/2019   • Acute on chronic systolic congestive heart failure (CMS/[facility]) [I50.23] 02/22/2019     · Echo (2/26/2019): LVEF 45%.  No significant valvular abnormality.  RVSP 64 mmHg     • Severe obesity (BMI 35.0-35.9 with comorbidity) (CMS/[facility]) [E66.01, Z68.35] 02/22/2019   • Type 2 diabetes mellitus with diabetic polyneuropathy, with long-term current use of insulin (CMS/[facility]) [E11.42, Z79.4] 02/22/2019   • COPD (chronic obstructive pulmonary disease) (CMS/[facility]) [J44.9] 02/22/2019   • Essential hypertension [I10] 02/22/2019          Brief Hospital Course to date:  Ms. Diego is a 83 year old man with history of COPD, CAD s/p CABG, chronic systolic heart failure who was admitted 2/22 with closed right ankle fracture. Found to have AKILA on CKD. Resp status decompensated after ORIF 2/23 and he was placed on BiPAP. Found to have elevated BNP and trop likely demand related. He was admitted to the ICU and was diuresed. Cardiology and nephrology consulted. Patient transferred to medicine service 2/28.      Bimalleolar ankle fracutre   - s/p repain 2/23   - Ortho following; post op care per ortho   - Follow up 3/12      Acute resp failure with hypoxia   -  improving and around baseline per family  - monitoring on fluid restriction   - Continue duo-nebs      Acute on chronic systolic heart failure   Ischemic cardiomyopathy   - Cards evaluated  - EF 45%   - Monitor I/Os; daily weights  - continue losartan, metoprolol -- likely will start entresto as outpatient   - consider noninvasive ischemic eval - can be done as outpatient   - Follow up with Dr. Govea in 4 weeks   - Resume lasix 20 mg daily      AKILA on CKD   - Neph following  - ARB started; diuresis held for the last few days and put on fluid restriction  - Would like to monitor weights and I/Os on just fluid restriction  - Cr improving and weight trending up; resume lasix 20 mg daily (home dose is BID)      COPD   - Scheduled duo-nebs   - Start symbicort      Demand ischemia   - continue ASA, statin, metoprolol      HTN   - continue isordil and hydralazine  - lisinopril changed to losartan and metoprolol resumed     DM    - continue to adjust as needed; will likely need to hold metformin and glipizide on discharge until stable kidney function but may benefit from managing DM with insulin alone      Obesity   - Complicates all aspects of care      Dispo; likely rehab in 2-3 days pending renal function and fluid balance       CODE STATUS:   Code Status and Medical Interventions:   Ordered at: 02/26/19 1233     Level Of Support Discussed With:    Health Care Surrogate     Code Status:    CPR     Medical Interventions (Level of Support Prior to Arrest):    Full     Comments:    per daughter and living will.  Please verify above with patient and daughter.         Electronically signed by Lisset Vigil DO, 03/03/19, 10:48 AM.

## 2019-03-03 NOTE — PROGRESS NOTES
Continued Stay Note  [facility]     Patient Name: Lico Diego  MRN: [id number]  Today's Date: 3/3/2019    Admit Date: 2/22/2019    Discharge Plan     Row Name 03/03/19 0907       Plan    Plan  update    Patient/Family in Agreement with Plan  yes    Plan Comments  Per RN, patient discharge delayed until tomorrow.  Called [facility] ambulance service and spoke to [place]a who has rescheduled transport for tomorrow, Monday 3/4/19 at 1300.  CM following.  Patient plan is to discharge to [facility] tomorrow via ambulance for transport at 1300.      Final Discharge Disposition Code  62 - inpatient rehab facility        Discharge Codes    No documentation.       Expected Discharge Date and Time     Expected Discharge Date Expected Discharge Time    Mar 2, 2019             Kayla Espinoza, RN

## 2019-03-03 NOTE — THERAPY TREATMENT NOTE
Acute Care - Physical Therapy Treatment Note  [facility]     Patient Name: Lico Diego  : 1935  MRN: [id number]  Today's Date: 3/3/2019  Onset of Illness/Injury or Date of Surgery: 19  Date of Referral to PT: 19  Referring Physician: Dr. Duarte     Admit Date: 2019    Visit Dx:    ICD-10-CM ICD-9-CM   1. Fall in home, initial encounter W19.XXXA E888.9    Y92.009 E849.0   2. Hypotension due to hypovolemia I95.89 458.8    E86.1 276.52   3. Volume depletion E86.9 276.50   4. Gastroenteritis K52.9 558.9   5. Acute renal failure superimposed on chronic kidney disease, unspecified CKD stage, unspecified acute renal failure type (CMS/[facility]) N17.9 584.9    N18.9 585.9   6. Closed fracture of right ankle, initial encounter S82.891A 824.8   7. Bimalleolar ankle fracture, right, closed, initial encounter S82.841A 824.4   8. Syndesmotic disruption of right ankle, initial encounter S93.431A 845.03   9. Impaired functional mobility, balance, gait, and endurance Z74.09 V49.89   10. Impaired mobility and ADLs Z74.09 799.89   11. Acute on chronic systolic congestive heart failure (CMS/[facility]) I50.23 428.23     428.0   12. Coronary artery disease involving native coronary artery of native heart with angina pectoris (CMS/HCC) I25.119 414.01     413.9     Patient Active Problem List   Diagnosis   • Fall at home   • CKD (chronic kidney disease) stage 3, GFR 30-59 ml/min (CMS/[facility])   • Diarrhea of presumed infectious origin   • Bilious vomiting with nausea   • Ischemic cardiomyopathy   • Acute on chronic systolic congestive heart failure (CMS/[facility])   • Severe obesity (BMI 35.0-35.9 with comorbidity) (CMS/[facility])   • Type 2 diabetes mellitus with diabetic polyneuropathy, with long-term current use of insulin (CMS/[facility])   • COPD (chronic obstructive pulmonary disease) (CMS/[facility])   • Essential hypertension   • Bimalleolar ankle fracture, right, closed, initial encounter   • Syndesmotic disruption of right ankle   •  Coronary artery disease involving native coronary artery of native heart with angina pectoris (CMS/HCC)   • Acute respiratory failure with hypoxia (CMS/HCC)   • Demand ischemia (CMS/HCC)   • JC (obstructive sleep apnea)   • Premature ventricular contractions (PVCs) (VPCs)       Therapy Treatment    Rehabilitation Treatment Summary     Row Name 03/03/19 0945             Treatment Time/Intention    Discipline  physical therapist  -CD      Document Type  therapy note (daily note)  -CD      Subjective Information  no complaints  -CD      Mode of Treatment  physical therapy  -CD      Patient/Family Observations  DTR PRESENT. PT IN FOWLERS UPON ARRIVAL ON RA. R LE IN SPLINT/ACE WRAP.   -CD      Care Plan Review  care plan/treatment goals reviewed;patient/other agree to care plan  -CD      Care Plan Review, Other Participant(s)  daughter  -CD      Therapy Frequency (PT Clinical Impression)  daily  -CD      Patient Effort  good  -CD      Existing Precautions/Restrictions  fall;non-weight bearing R LE WBAT WITH TRANSFERS ONLY, OTHERWISE NWB.   -CD      Recorded by [CD] Reshma Patton, PT 03/03/19 1155      Row Name 03/03/19 0945             Vital Signs    Post Systolic BP Rehab  149  -CD      Post Treatment Diastolic BP  64  -CD      Posttreatment Heart Rate (beats/min)  85  -CD      Post SpO2 (%)  92  -CD      O2 Delivery Post Treatment  supplemental O2  -CD      Pre Patient Position  Supine  -CD      Intra Patient Position  Standing  -CD      Post Patient Position  Sitting  -CD      Recorded by [CD] Reshma Patton, PT 03/03/19 1155      Row Name 03/03/19 0945             Cognitive Assessment/Intervention- PT/OT    Affect/Mental Status (Cognitive)  WFL  -CD      Orientation Status (Cognition)  oriented x 4  -CD      Safety Deficit (Cognitive)  insight into deficits/self awareness;safety precautions awareness  -CD      Personal Safety Interventions  fall prevention program maintained;gait belt;muscle strengthening  facilitated;nonskid shoes/slippers when out of bed;supervised activity  -CD      Recorded by [CD] Reshma Patton, PT 03/03/19 1155      Row Name 03/03/19 0945             Bed Mobility Assessment/Treatment    Bed Mobility Assessment/Treatment  supine-sit  -CD      Scooting/Bridging [facility] (Bed Mobility)  contact guard  -CD      Bed Mobility, Safety Issues  decreased use of legs for bridging/pushing  -CD      Assistive Device (Bed Mobility)  bed rails;head of bed elevated  -CD      Comment (Bed Mobility)  GOOD SLR ON R FOR OOB ACTIVITY.   -CD      Recorded by [CD] Reshma Patton, PT 03/03/19 1155      Row Name 03/03/19 0945             Transfer Assessment/Treatment    Transfer Assessment/Treatment  sit-stand transfer;stand-sit transfer;stand pivot/stand step transfer  -CD      Comment (Transfers)  CUES FOR HAND PLACEMENT. ELEVATED BED TO STAND.   -CD      Recorded by [CD] Reshma Pattno, PT 03/03/19 1155      Row Name 03/03/19 0945             Bed-Chair Transfer    Bed-Chair [facility] (Transfers)  minimum assist (75% patient effort) TO THE L.  -CD      Assistive Device (Bed-Chair Transfers)  walker, front-wheeled  -CD      Recorded by [CD] Reshma Patton, PT 03/03/19 1155      Row Name 03/03/19 0945             Sit-Stand Transfer    Sit-Stand [facility] (Transfers)  moderate assist (50% patient effort)  -CD      Assistive Device (Sit-Stand Transfers)  walker, front-wheeled  -CD      Recorded by [CD] Reshma Patton, PT 03/03/19 1155      Row Name 03/03/19 0945             Stand-Sit Transfer    Stand-Sit [facility] (Transfers)  minimum assist (75% patient effort)  -CD      Assistive Device (Stand-Sit Transfers)  walker, front-wheeled  -CD      Recorded by [CD] Reshma Patton, PT 03/03/19 1155      Row Name 03/03/19 0945             Gait/Stairs Assessment/Training    Comment (Gait/Stairs)  NOT APPROPRIATE TO HOP ON L LE DUE TO NEUROPATHY, ARTHRITIC JOINTS. PT IS NWB R LE EXCEPT TO TRANSFER.   -CD       Recorded by [CD] Reshma Patton, PT 03/03/19 1155      Row Name 03/03/19 0945             Therapeutic Exercise    43111 - PT Therapeutic Exercise Minutes  15  -CD      19110 - PT Therapeutic Activity Minutes  15  -CD      Recorded by [CD] Reshma Patton, PT 03/03/19 1155      Row Name 03/03/19 0945             Therapeutic Exercise    Lower Extremity (Therapeutic Exercise)  LAQ (long arc quad), bilateral;marching while seated  -CD      Exercise Type (Therapeutic Exercise)  AROM (active range of motion)  -CD      Position (Therapeutic Exercise)  seated  -CD      Sets/Reps (Therapeutic Exercise)  1 SET OF 10 REPS.   -CD      Comment (Therapeutic Exercise)  DEMONSTRATED PARTIAL CHAIR PUSH UPS FROM RECLINER, NWB R LE TO UNWEIGHT BUTTOCKS AND STRENGTHEN B UE'S.    -CD      Recorded by [CD] Reshma Patton, PT 03/03/19 1155      Row Name 03/03/19 0945             Balance    Balance  static sitting balance;static standing balance;dynamic standing balance  -CD      Recorded by [CD] Reshma Patton, PT 03/03/19 1155      Row Name 03/03/19 0945             Static Sitting Balance    Level of [place] (Unsupported Sitting, Static Balance)  independent  -CD      Sitting Position (Unsupported Sitting, Static Balance)  sitting on edge of bed  -CD      Time Able to Maintain Position (Unsupported Sitting, Static Balance)  more than 5 minutes  -CD      Recorded by [CD] Reshma Patton, PT 03/03/19 1155      Row Name 03/03/19 0945             Static Standing Balance    Level of [place] (Supported Standing, Static Balance)  contact guard assist  -CD      Time Able to Maintain Position (Supported Standing, Static Balance)  1 to 2 minutes  -CD      Assistive Device Utilized (Supported Standing, Static Balance)  walker, rolling  -CD      Comment (Supported Standing, Static Balance)  WORKED ON UPRIGHT POSTURE AND STANDING ENDURANCE WHILE NWB R LE.   -CD      Recorded by [CD] Reshma Patton, PT 03/03/19 1155      Row Name 03/03/19 0923              Dynamic Standing Balance    Level of [place], Reaches Outside Midline (Standing, Dynamic Balance)  minimal assist, 75% patient effort  -CD      Time Able to Maintain Position, Reaches Outside Midline (Standing, Dynamic Balance)  15 to 30 seconds  -CD      Assistive Device Utilized (Supported Standing, Dynamic Balance)  walker, rolling FOR STAND STEP TRANSFER TO RECLINER.   -CD      Recorded by [CD] Reshma Patton, PT 03/03/19 1155      Row Name 03/03/19 0945             Positioning and Restraints    Pre-Treatment Position  in bed  -CD      Post Treatment Position  chair  -CD      In Chair  reclined;call light within reach;encouraged to call for assist;with family/caregiver;notified nsg DTR TO ASSIST WITH ELEVATING B LE'S AFTER PT USES URINAL.   -CD      Recorded by [CD] Reshma Patton, PT 03/03/19 1155      Row Name 03/03/19 0945             Pain Scale: Numbers Pre/Post-Treatment    Pain Scale: Numbers, Pretreatment  0/10 - no pain  -CD      Pain Scale: Numbers, Post-Treatment  0/10 - no pain  -CD      Recorded by [CD] Reshma Patton, PT 03/03/19 1155      Row Name                Wound 02/23/19 1529 Right leg incision    Wound - Properties Group Date first assessed: 02/23/19 [TB] Time first assessed: 1529 [TB] Side: Right [TB] Location: leg [TB] Type: incision [TB] Recorded by:  [TB] Fabienne Calderon RN 02/23/19 1529    Row Name 03/03/19 0945             Outcome Summary/Treatment Plan (PT)    Daily Summary of Progress (PT)  progress toward functional goals is good  -CD      Anticipated Discharge Disposition (PT)  inpatient rehabilitation facility  -CD      Recorded by [CD] Reshma Patton, PT 03/03/19 1155        User Key  (r) = Recorded By, (t) = Taken By, (c) = Cosigned By    Initials Name Effective Dates Discipline    CD Reshma Patton, PT 06/19/15 -  PT    TB Fabienne Calderon RN 06/16/16 -  Nurse          Wound 02/23/19 1529 Right leg incision (Active)   Dressing Appearance dry;intact;no  drainage 3/3/2019  5:57 AM   Closure ALEXEI 3/3/2019  5:57 AM   Base dressing in place, unable to visualize 3/3/2019  5:57 AM           Physical Therapy Education     Title: PT OT SLP Therapies (In Progress)     Topic: Physical Therapy (Done)     Point: Mobility training (Done)     Learning Progress Summary           Patient Acceptance, E, VU,NR by CD at 3/3/2019 11:55 AM    Comment:  TRANSFERS TRAINING, STANDING TOLERANCE- NWB R LE, PROGRESSION OF POC, THER EX.    Eager, E, NR by CHANTELL at 3/2/2019  1:21 PM    Acceptance, E, VU,NR by DIANA at 3/1/2019  9:32 AM    Acceptance, E, VU,NR by DIANA at 2/28/2019 11:35 AM    Acceptance, E,D, NR by MARK at 2/27/2019  9:10 AM    Acceptance, E,TB, VU by  at 2/25/2019  4:51 PM    Acceptance, E,D, VU,NR by NICKIE at 2/24/2019 10:19 AM    Comment:  Reviewed benefits of activity, transfer training, weight-bearing status, progression of POC.   Family Acceptance, E, VU,NR by CD at 3/3/2019 11:55 AM    Comment:  TRANSFERS TRAINING, STANDING TOLERANCE- NWB R LE, PROGRESSION OF POC, THER EX.    Eager, E, NR by CHANTELL at 3/2/2019  1:21 PM    Acceptance, E,TB, VU by  at 2/25/2019  4:51 PM    Acceptance, E,D, VU,NR by NICKIE at 2/24/2019 10:19 AM    Comment:  Reviewed benefits of activity, transfer training, weight-bearing status, progression of POC.                   Point: Home exercise program (Done)     Learning Progress Summary           Patient Acceptance, E, VU,NR by CD at 3/3/2019 11:55 AM    Comment:  TRANSFERS TRAINING, STANDING TOLERANCE- NWB R LE, PROGRESSION OF POC, THER EX.    Acceptance, E, VU,NR by DIANA at 3/1/2019  9:32 AM    Acceptance, E, VU,NR by DIANA at 2/28/2019 11:35 AM    Acceptance, E,D, NR by MARK at 2/27/2019  9:10 AM    Acceptance, E,TB, VU by  at 2/25/2019  4:51 PM    Acceptance, E,D, VU,NR by NICKIE at 2/24/2019 10:19 AM    Comment:  Reviewed benefits of activity, transfer training, weight-bearing status, progression of POC.   Family Acceptance, E, VU,NR by CD at 3/3/2019 11:55 AM     Comment:  TRANSFERS TRAINING, STANDING TOLERANCE- NWB R LE, PROGRESSION OF POC, THER EX.    Acceptance, E,TB, VU by  at 2/25/2019  4:51 PM    Acceptance, E,D, VU,NR by NICKIE at 2/24/2019 10:19 AM    Comment:  Reviewed benefits of activity, transfer training, weight-bearing status, progression of POC.                   Point: Body mechanics (Done)     Learning Progress Summary           Patient Acceptance, E, VU,NR by CD at 3/3/2019 11:55 AM    Comment:  TRANSFERS TRAINING, STANDING TOLERANCE- NWB R LE, PROGRESSION OF POC, THER EX.    Eager, E, NR by AA at 3/2/2019  1:21 PM    Acceptance, E, VU,NR by DIANA at 3/1/2019  9:32 AM    Acceptance, E, VU,NR by DIANA at 2/28/2019 11:35 AM    Acceptance, E,D, NR by MARK at 2/27/2019  9:10 AM    Acceptance, E,TB, VU by  at 2/25/2019  4:51 PM    Acceptance, E,D, VU,NR by NICKIE at 2/24/2019 10:19 AM    Comment:  Reviewed benefits of activity, transfer training, weight-bearing status, progression of POC.   Family Acceptance, E, VU,NR by JOEY at 3/3/2019 11:55 AM    Comment:  TRANSFERS TRAINING, STANDING TOLERANCE- NWB R LE, PROGRESSION OF POC, THER EX.    Eager, E, NR by AA at 3/2/2019  1:21 PM    Acceptance, E,TB, VU by  at 2/25/2019  4:51 PM    Acceptance, E,D, VU,NR by NICKIE at 2/24/2019 10:19 AM    Comment:  Reviewed benefits of activity, transfer training, weight-bearing status, progression of POC.                   Point: Precautions (Done)     Learning Progress Summary           Patient Acceptance, E, VU,NR by CD at 3/3/2019 11:55 AM    Comment:  TRANSFERS TRAINING, STANDING TOLERANCE- NWB R LE, PROGRESSION OF POC, THER EX.    Eager, E, NR by CHANTELL at 3/2/2019  1:21 PM    Acceptance, E, VU,NR by DIANA at 3/1/2019  9:32 AM    Acceptance, E, VU,NR by DIANA at 2/28/2019 11:35 AM    Acceptance, E,D, NR by MARK at 2/27/2019  9:10 AM    Acceptance, E,TB, VU by  at 2/25/2019  4:51 PM    Acceptance, JHONATAN RAMIREZ VU,NR by NICKIE at 2/24/2019 10:19 AM    Comment:  Reviewed benefits of activity, transfer training,  weight-bearing status, progression of POC.   Family Acceptance, E, VU,NR by CD at 3/3/2019 11:55 AM    Comment:  TRANSFERS TRAINING, STANDING TOLERANCE- NWB R LE, PROGRESSION OF POC, THER EX.    Elizabeth, ASHLEY, NR by AA at 3/2/2019  1:21 PM    Acceptance, E,TB, VU by JR at 2/25/2019  4:51 PM    Acceptance, E,D, VU,NR by LM at 2/24/2019 10:19 AM    Comment:  Reviewed benefits of activity, transfer training, weight-bearing status, progression of POC.                               User Key     Initials Effective Dates Name Provider Type Discipline    CD 06/19/15 -  Reshma Patton, PT Physical Therapist PT    EJ 11/20/18 -  Natasha García, PT Physical Therapist PT    LS 06/19/15 -  Francoise Johnson, PT Physical Therapist PT    JR 02/06/17 -  Aleksandr Constantino, RN Registered Nurse Nurse    NICKIE 04/03/18 -  Noemi Castro, PT Physical Therapist PT    AA 04/02/18 -  Katharine Waite, PT Physical Therapist PT                PT Recommendation and Plan  Anticipated Discharge Disposition (PT): inpatient rehabilitation facility  Therapy Frequency (PT Clinical Impression): daily  Outcome Summary/Treatment Plan (PT)  Daily Summary of Progress (PT): progress toward functional goals is good  Anticipated Discharge Disposition (PT): inpatient rehabilitation facility  Plan of Care Reviewed With: patient  Progress: improving  Outcome Summary: PT GIVES GOOD EFFORT AND IS GOOD REHAB CANDIDATE. ABLE TO STAND FOR APPROX 1 MINUTE AT R WALKER WHILE NWB R LE. NEEDS IRF AT D/C.   Outcome Measures     Row Name 03/03/19 0945 03/02/19 1114 03/01/19 0932       How much help from another person do you currently need...    Turning from your back to your side while in flat bed without using bedrails?  4  -CD  3  -AA  3  -EJ    Moving from lying on back to sitting on the side of a flat bed without bedrails?  3  -CD  3  -AA  3  -EJ    Moving to and from a bed to a chair (including a wheelchair)?  3  -CD  3  -AA  3  -EJ    Standing up from a chair using your  arms (e.g., wheelchair, bedside chair)?  2  -CD  2  -AA  2  -EJ    Climbing 3-5 steps with a railing?  1  -CD  1  -AA  1  -EJ    To walk in hospital room?  1  -CD  1  -AA  1  -EJ    AM-PAC 6 Clicks Score  14  -CD  13  -AA  13  -EJ       Functional Assessment    Outcome Measure Options  AM-PAC 6 Clicks Basic Mobility (PT)  -CD  AM-PAC 6 Clicks Basic Mobility (PT)  -AA  AM-PAC 6 Clicks Basic Mobility (PT)  -EJ      User Key  (r) = Recorded By, (t) = Taken By, (c) = Cosigned By    Initials Name Provider Type    Reshma Ortiz, PT Physical Therapist    Natasha Chowdhury, PT Physical Therapist    Katharine Schultz, PT Physical Therapist         Time Calculation:   PT Charges     Row Name 03/03/19 0945             Time Calculation    Start Time  0945  -CD      PT Received On  03/03/19  -CD      PT Goal Re-Cert Due Date  03/09/19  -CD         Time Calculation- PT    Total Timed Code Minutes- PT  30 minute(s)  -CD         Timed Charges    32077 - PT Therapeutic Exercise Minutes  15  -CD      93901 - PT Therapeutic Activity Minutes  15  -CD        User Key  (r) = Recorded By, (t) = Taken By, (c) = Cosigned By    Initials Name Provider Type    Reshma Ortiz, PT Physical Therapist        Therapy Suggested Charges     Code   Minutes Charges    96153 (CPT®) Hc Pt Neuromusc Re Education Ea 15 Min      37545 (CPT®) Hc Pt Ther Proc Ea 15 Min 15 1    11634 (CPT®) Hc Gait Training Ea 15 Min      23469 (CPT®) Hc Pt Therapeutic Act Ea 15 Min 15 1    25899 (CPT®) Hc Pt Manual Therapy Ea 15 Min      07331 (CPT®) Hc Pt Iontophoresis Ea 15 Min      83352 (CPT®) Hc Pt Elec Stim Ea-Per 15 Min      66646 (CPT®) Hc Pt Ultrasound Ea 15 Min      42270 (CPT®) Hc Pt Self Care/Mgmt/Train Ea 15 Min      59697 (CPT®) Hc Pt Prosthetic (S) Train Initial Encounter, Each 15 Min      58027 (CPT®) Hc Pt Orthotic(S)/Prosthetic(S) Encounter, Each 15 Min      98321 (CPT®) Hc Orthotic(S) Mgmt/Train Initial Encounter, Each 15min      Total  30 2         Therapy Charges for Today     Code Description Service Date Service Provider Modifiers Qty    01409849022  PT THER PROC EA 15 MIN 3/3/2019 Reshma Patton, PT GP 1    [id number]  PT THERAPEUTIC ACT EA 15 MIN 3/3/2019 Reshma Patton, PT GP 1          PT G-Codes  Outcome Measure Options: AM-PAC 6 Clicks Basic Mobility (PT)  AM-PAC 6 Clicks Score: 14  Score: 14    Reshma Patton, PT  3/3/2019

## 2019-03-03 NOTE — PLAN OF CARE
Problem: Patient Care Overview  Goal: Plan of Care Review  Outcome: Ongoing (interventions implemented as appropriate)   03/03/19 1566   Coping/Psychosocial   Plan of Care Reviewed With patient   Plan of Care Review   Progress improving   OTHER   Outcome Summary PT GIVES GOOD EFFORT AND IS GOOD REHAB CANDIDATE. ABLE TO STAND WITH CGA  FOR APPROX 1 MINUTE AT ROLLING WALKER WHILE NWB R LE.PT PERFORMS STAND STEP PIVOT TRANSFER WITH MIN ASSIST USING ROLLING  WALKER WBAT R LE. NEEDS IRF AT D/C.

## 2019-03-03 NOTE — PROGRESS NOTES
"   LOS: 9 days    Patient Care Team:  Khai Menjivar DO as PCP - General (Family Medicine)  Ti Santiago MD as Consulting Physician (Interventional Cardiology)    Subjective     No acute events overnight. No new complaints.     Objective     Vital Signs:  Blood pressure 157/62, pulse 81, temperature 98.6 °F (37 °C), temperature source Oral, resp. rate 20, height 180.3 cm (71\"), weight 127 kg (279 lb), SpO2 90 %.    Flowsheet Rows      First Filed Value   Admission Height  180.3 cm (71\") Documented at 02/22/2019 1200   Admission Weight  119 kg (263 lb) Documented at 02/22/2019 1200          03/02 0701 - 03/03 0700  In: 250 [P.O.:250]  Out: 2495 [Urine:2495]    Physical Exam:    General Appearance: NAD WD obese WM  Neuro:   awake alert   Psych:  Normal mood and affect  CV:  RRR,  no edema  Lungs:  Mild increased WOB,  + rhonchi  Abdomen: not distended, bowel sounds present  :  No mcdonald, no palp bladder  Skin: stasis dermatitis left lower ext, right in bandage, Warm and dry      Labs:  Results from last 7 days   Lab Units 02/28/19  0535 02/27/19  0337 02/26/19  0014   WBC 10*3/mm3 8.45 7.00 7.02   HEMOGLOBIN g/dL 9.3* 9.2* 9.7*   PLATELETS 10*3/mm3 244 229 183     Results from last 7 days   Lab Units 03/03/19  0342 03/02/19  0651 03/01/19  0600 02/28/19  1613 02/28/19  0535 02/27/19  0337 02/26/19  0951  02/26/19  0016  02/25/19  0524   SODIUM mmol/L 133 133 128* 128* 130* 132  --    < > 133   < > 133   POTASSIUM mmol/L 4.8 4.4 4.1 4.5 3.9 4.1  --    < > 4.3   < > 5.2   CHLORIDE mmol/L 99 100 97* 94* 97* 99  --    < > 101   < > 105   CO2 mmol/L 27.0 25.0 24.0 26.0 24.0 24.0  --    < > 24.0   < > 20.0   BUN mg/dL 53* 56* 48* 46* 37* 40*  --    < > 44*   < > 44*   CREATININE mg/dL 1.68* 1.74* 1.80* 1.92* 1.50* 1.59*  --    < > 1.69*   < > 1.97*   CALCIUM mg/dL 8.4* 8.3* 8.1* 8.2* 8.3* 8.6*  --    < > 8.6*   < > 8.2*   PHOSPHORUS mg/dL  --   --   --   --   --  2.6 2.3*  --   --   --  3.5   MAGNESIUM mg/dL  --   -- "   --   --  2.2 2.2 1.8  --  2.0  --   --    ALBUMIN g/dL  --   --   --   --   --  3.67  --   --  3.76  --  3.71    < > = values in this interval not displayed.     Results from last 7 days   Lab Units 02/27/19  0337   ALK PHOS U/L 67   BILIRUBIN mg/dL 0.9   ALT (SGPT) U/L 14   AST (SGOT) U/L 24     Results from last 7 days   Lab Units 02/26/19  0016   PH, ARTERIAL pH units 7.356   PO2 ART mm Hg 60.5*   PCO2, ARTERIAL mm Hg 41.1   HCO3 ART mmol/L 23.0     Results from last 7 days   Lab Units 02/28/19  2131   COLOR UA  Yellow   CLARITY UA  Clear   PH, URINE  <=5.0   SPECIFIC GRAVITY, URINE  1.020   GLUCOSE UA  Negative   KETONES UA  Negative   BILIRUBIN UA  Negative   PROTEIN UA  Negative   BLOOD UA  Negative   LEUKOCYTES UA  Negative   NITRITE UA  Negative       Estimated Creatinine Clearance: 46 mL/min (A) (by C-G formula based on SCr of 1.68 mg/dL (H)).         A/P:    ARF:  Non-oliguric  secondary to decreased IVV, vomiting,Slighty better after discontinuing diuresis - Improving.     CKD3:  secondary to nephrosclerosis.  Baseline 1.7-1.8    HTN - Controlled    Anemia - iron def.  Getting IVFe    Hyponatremia: 133 stable.       Plan:  Continue with current treatment. UOP is excellent.     Case discussed with Family members and RN.     Della Santillan MD  03/03/19  9:36 AM

## 2019-03-03 NOTE — PLAN OF CARE
Problem: Patient Care Overview  Goal: Plan of Care Review  Outcome: Ongoing (interventions implemented as appropriate)   03/03/19 0772   Coping/Psychosocial   Plan of Care Reviewed With patient   Plan of Care Review   Progress improving   OTHER   Outcome Summary Pt continues to improve. Denies any SOA or pain. CPAP overnight. Plan is to go to [facility] 3/3. Ambulance is scheduled for 1300.

## 2019-03-04 VITALS
HEART RATE: 69 BPM | RESPIRATION RATE: 18 BRPM | DIASTOLIC BLOOD PRESSURE: 62 MMHG | OXYGEN SATURATION: 93 % | HEIGHT: 71 IN | TEMPERATURE: 97.5 F | SYSTOLIC BLOOD PRESSURE: 147 MMHG | WEIGHT: 273.3 LBS | BODY MASS INDEX: 38.26 KG/M2

## 2019-03-04 PROBLEM — I24.8 DEMAND ISCHEMIA (HCC): Status: RESOLVED | Noted: 2019-02-25 | Resolved: 2019-03-04

## 2019-03-04 PROBLEM — J96.01 ACUTE RESPIRATORY FAILURE WITH HYPOXIA (HCC): Status: RESOLVED | Noted: 2019-02-25 | Resolved: 2019-03-04

## 2019-03-04 LAB
ANION GAP SERPL CALCULATED.3IONS-SCNC: 6 MMOL/L (ref 3–11)
BUN BLD-MCNC: 39 MG/DL (ref 9–23)
BUN/CREAT SERPL: 27.3 (ref 7–25)
CALCIUM SPEC-SCNC: 8.5 MG/DL (ref 8.7–10.4)
CHLORIDE SERPL-SCNC: 99 MMOL/L (ref 99–109)
CO2 SERPL-SCNC: 28 MMOL/L (ref 20–31)
CREAT BLD-MCNC: 1.43 MG/DL (ref 0.6–1.3)
GFR SERPL CREATININE-BSD FRML MDRD: 47 ML/MIN/1.73
GLUCOSE BLD-MCNC: 131 MG/DL (ref 70–100)
GLUCOSE BLDC GLUCOMTR-MCNC: 156 MG/DL (ref 70–130)
GLUCOSE BLDC GLUCOMTR-MCNC: 201 MG/DL (ref 70–130)
POTASSIUM BLD-SCNC: 4.5 MMOL/L (ref 3.5–5.5)
SODIUM BLD-SCNC: 133 MMOL/L (ref 132–146)

## 2019-03-04 PROCEDURE — 97530 THERAPEUTIC ACTIVITIES: CPT

## 2019-03-04 PROCEDURE — 94799 UNLISTED PULMONARY SVC/PX: CPT

## 2019-03-04 PROCEDURE — 99239 HOSP IP/OBS DSCHRG MGMT >30: CPT | Performed by: INTERNAL MEDICINE

## 2019-03-04 PROCEDURE — 97535 SELF CARE MNGMENT TRAINING: CPT

## 2019-03-04 PROCEDURE — 97110 THERAPEUTIC EXERCISES: CPT

## 2019-03-04 PROCEDURE — 99024 POSTOP FOLLOW-UP VISIT: CPT | Performed by: ORTHOPAEDIC SURGERY

## 2019-03-04 PROCEDURE — 80048 BASIC METABOLIC PNL TOTAL CA: CPT | Performed by: INTERNAL MEDICINE

## 2019-03-04 PROCEDURE — 25010000002 ENOXAPARIN PER 10 MG: Performed by: ORTHOPAEDIC SURGERY

## 2019-03-04 PROCEDURE — 82962 GLUCOSE BLOOD TEST: CPT

## 2019-03-04 RX ORDER — IPRATROPIUM BROMIDE AND ALBUTEROL SULFATE 2.5; .5 MG/3ML; MG/3ML
3 SOLUTION RESPIRATORY (INHALATION)
Qty: 360 ML
Start: 2019-03-04 | End: 2019-04-09

## 2019-03-04 RX ORDER — PSEUDOEPHEDRINE HCL 30 MG
100 TABLET ORAL 2 TIMES DAILY PRN
Start: 2019-03-04 | End: 2019-04-09

## 2019-03-04 RX ORDER — FUROSEMIDE 20 MG/1
20 TABLET ORAL DAILY
Start: 2019-03-05 | End: 2019-11-09 | Stop reason: HOSPADM

## 2019-03-04 RX ORDER — HYDROCODONE BITARTRATE AND ACETAMINOPHEN 5; 325 MG/1; MG/1
1 TABLET ORAL EVERY 6 HOURS PRN
Qty: 12 TABLET | Refills: 0 | Status: SHIPPED | OUTPATIENT
Start: 2019-03-04 | End: 2019-03-04

## 2019-03-04 RX ORDER — BUDESONIDE AND FORMOTEROL FUMARATE DIHYDRATE 80; 4.5 UG/1; UG/1
2 AEROSOL RESPIRATORY (INHALATION)
Refills: 12
Start: 2019-03-04 | End: 2019-04-09

## 2019-03-04 RX ORDER — HYDROCODONE BITARTRATE AND ACETAMINOPHEN 5; 325 MG/1; MG/1
1 TABLET ORAL EVERY 6 HOURS PRN
Qty: 12 TABLET | Refills: 0 | Status: SHIPPED | OUTPATIENT
Start: 2019-03-04 | End: 2019-03-09

## 2019-03-04 RX ORDER — FLUTICASONE PROPIONATE 50 MCG
2 SPRAY, SUSPENSION (ML) NASAL DAILY
Start: 2019-03-05

## 2019-03-04 RX ADMIN — ALLOPURINOL 100 MG: 100 TABLET ORAL at 08:38

## 2019-03-04 RX ADMIN — INSULIN LISPRO 2 UNITS: 100 INJECTION, SOLUTION INTRAVENOUS; SUBCUTANEOUS at 12:11

## 2019-03-04 RX ADMIN — IPRATROPIUM BROMIDE AND ALBUTEROL SULFATE 3 ML: 2.5; .5 SOLUTION RESPIRATORY (INHALATION) at 07:56

## 2019-03-04 RX ADMIN — HYDROCODONE BITARTRATE AND ACETAMINOPHEN 1 TABLET: 5; 325 TABLET ORAL at 11:10

## 2019-03-04 RX ADMIN — DOCUSATE SODIUM 100 MG: 100 CAPSULE, LIQUID FILLED ORAL at 08:39

## 2019-03-04 RX ADMIN — POLYETHYLENE GLYCOL 3350 17 G: 17 POWDER, FOR SOLUTION ORAL at 08:39

## 2019-03-04 RX ADMIN — INSULIN LISPRO 4 UNITS: 100 INJECTION, SOLUTION INTRAVENOUS; SUBCUTANEOUS at 08:40

## 2019-03-04 RX ADMIN — CYANOCOBALAMIN TAB 1000 MCG 1000 MCG: 1000 TAB at 08:38

## 2019-03-04 RX ADMIN — CITALOPRAM HYDROBROMIDE 10 MG: 20 TABLET ORAL at 08:38

## 2019-03-04 RX ADMIN — LOSARTAN POTASSIUM 25 MG: 25 TABLET, FILM COATED ORAL at 08:38

## 2019-03-04 RX ADMIN — ATORVASTATIN CALCIUM 10 MG: 10 TABLET, FILM COATED ORAL at 08:38

## 2019-03-04 RX ADMIN — FUROSEMIDE 20 MG: 20 TABLET ORAL at 08:38

## 2019-03-04 RX ADMIN — SODIUM CHLORIDE, PRESERVATIVE FREE 3 ML: 5 INJECTION INTRAVENOUS at 08:41

## 2019-03-04 RX ADMIN — ASPIRIN 81 MG: 81 TABLET, COATED ORAL at 08:39

## 2019-03-04 RX ADMIN — FLUTICASONE PROPIONATE 2 SPRAY: 50 SPRAY, METERED NASAL at 08:40

## 2019-03-04 RX ADMIN — BUDESONIDE AND FORMOTEROL FUMARATE DIHYDRATE 2 PUFF: 80; 4.5 AEROSOL RESPIRATORY (INHALATION) at 07:56

## 2019-03-04 RX ADMIN — METOPROLOL SUCCINATE 100 MG: 100 TABLET, FILM COATED, EXTENDED RELEASE ORAL at 08:38

## 2019-03-04 RX ADMIN — ENOXAPARIN SODIUM 30 MG: 30 INJECTION SUBCUTANEOUS at 08:39

## 2019-03-04 NOTE — THERAPY DISCHARGE NOTE
Acute Care - Physical Therapy Discharge Summary  [facility]       Patient Name: Lico Diego  : 1935  MRN: [id number]    Today's Date: 3/4/2019  Onset of Illness/Injury or Date of Surgery: 19    Date of Referral to PT: 19  Referring Physician: Dr. Duarte       Admit Date: 2019      PT Recommendation and Plan    Visit Dx:    ICD-10-CM ICD-9-CM   1. Fall in home, initial encounter W19.XXXA E888.9    Y92.009 E849.0   2. Hypotension due to hypovolemia I95.89 458.8    E86.1 276.52   3. Volume depletion E86.9 276.50   4. Gastroenteritis K52.9 558.9   5. Acute renal failure superimposed on chronic kidney disease, unspecified CKD stage, unspecified acute renal failure type (CMS/[facility]) N17.9 584.9    N18.9 585.9   6. Closed fracture of right ankle, initial encounter S82.891A 824.8   7. Bimalleolar ankle fracture, right, closed, initial encounter S82.841A 824.4   8. Syndesmotic disruption of right ankle, initial encounter S93.431A 845.03   9. Impaired functional mobility, balance, gait, and endurance Z74.09 V49.89   10. Impaired mobility and ADLs Z74.09 799.89   11. Acute on chronic systolic congestive heart failure (CMS/HCC) I50.23 428.23     428.0   12. Coronary artery disease involving native coronary artery of native heart with angina pectoris (CMS/HCC) I25.119 414.01     413.9       Outcome Measures     Row Name 19 0947 19 0945 19 1114       How much help from another person do you currently need...    Turning from your back to your side while in flat bed without using bedrails?  --  4  -CD  3  -AA    Moving from lying on back to sitting on the side of a flat bed without bedrails?  --  3  -CD  3  -AA    Moving to and from a bed to a chair (including a wheelchair)?  --  3  -CD  3  -AA    Standing up from a chair using your arms (e.g., wheelchair, bedside chair)?  --  2  -CD  2  -AA    Climbing 3-5 steps with a railing?  --  1  -CD  1  -AA    To walk in hospital room?  --  1   -CD  1  -AA    AM-[facility] 6 Clicks Score  --  14  -CD  13  -AA       How much help from another is currently needed...    Putting on and taking off regular lower body clothing?  3  -FABRICIO  --  --    Bathing (including washing, rinsing, and drying)  3  -FABRICIO  --  --    Toileting (which includes using toilet bed pan or urinal)  3  -FABRICIO  --  --    Putting on and taking off regular upper body clothing  4  -FABRICIO  --  --    Taking care of personal grooming (such as brushing teeth)  3  -FABRICIO  --  --    Eating meals  4  -FABRICIO  --  --    Score  20  -FABRICIO  --  --       Functional Assessment    Outcome Measure Options  AM-PAC 6 Clicks Daily Activity (OT)  -FABRICIO  AM-[facility] 6 Clicks Basic Mobility (PT)  -CD  AM-[facility] 6 Clicks Basic Mobility (PT)  -AA      User Key  (r) = Recorded By, (t) = Taken By, (c) = Cosigned By    Initials Name Provider Type    FABRICIO Jayla Arellano, OT Occupational Therapist    CD Reshma Patton, PT Physical Therapist    AA Katharine Waite, PT Physical Therapist            Therapy Suggested Charges     Code   Minutes Charges    59664 (CPT®) Hc Pt Neuromusc Re Education Ea 15 Min      79596 (CPT®) Hc Pt Ther Proc Ea 15 Min 15 1    00049 (CPT®) Hc Gait Training Ea 15 Min      07412 (CPT®) Hc Pt Therapeutic Act Ea 15 Min 15 1    15374 (CPT®) Hc Pt Manual Therapy Ea 15 Min      37496 (CPT®) Hc Pt Iontophoresis Ea 15 Min      61696 (CPT®) Hc Pt Elec Stim Ea-Per 15 Min      18746 (CPT®) Hc Pt Ultrasound Ea 15 Min      43038 (CPT®) Hc Pt Self Care/Mgmt/Train Ea 15 Min      36165 (CPT®) Hc Pt Prosthetic (S) Train Initial Encounter, Each 15 Min      44656 (CPT®) Hc Pt Orthotic(S)/Prosthetic(S) Encounter, Each 15 Min      58018 (CPT®) Hc Orthotic(S) Mgmt/Train Initial Encounter, Each 15min      Total  30 2          Rehab Goal Summary     Row Name 03/04/19 1102 03/04/19 0947          Bed Mobility Goal 1 (PT)    Activity/Assistive Device (Bed Mobility Goal 1, PT)  sit to supine/supine to sit  -MJ  --     [place] Level/Cues Needed (Bed  Mobility Goal 1, PT)  supervision required  -MJ  --     Time Frame (Bed Mobility Goal 1, PT)  2 weeks  -MJ  --     Progress/Outcomes (Bed Mobility Goal 1, PT)  goal not met;discharged from facility  -  --        Transfer Goal 1 (PT)    Activity/Assistive Device (Transfer Goal 1, PT)  sit-to-stand/stand-to-sit;walker, rolling  -MJ  --     [place] Level/Cues Needed (Transfer Goal 1, PT)  supervision required  -MJ  --     Time Frame (Transfer Goal 1, PT)  2 weeks  -MJ  --     Barriers (Transfers Goal 1, PT)  maintaining WB status  -MJ  --     Progress/Outcome (Transfer Goal 1, PT)  goal not met;discharged from facility  -  --        Transfer Goal 2 (PT)    Activity/Assistive Device (Transfer Goal 2, PT)  bed-to-chair/chair-to-bed  -MJ  --     [place] Level/Cues Needed (Transfer Goal 2, PT)  contact guard assist  -MJ  --     Time Frame (Transfer Goal 2, PT)  2 weeks  -MJ  --     Barriers (Transfers Goal 2, PT)  maintaining WB status  -MJ  --     Progress/Outcome (Transfer Goal 2, PT)  goal not met;discharged from facility  -  --        Balance Goal 1 (PT)    Activity/Assistive Device (Balance Goal 1, PT)  sitting, dynamic  -MJ  --     [place] Level/Cues Needed (Balance Goal 1, PT)  independent  -MJ  --     Time Frame (Balance Goal 1, PT)  2 weeks  -MJ  --     Progress/Outcomes (Balance Goal 1, PT)  goal not met;discharged from [facility]  -  --        Transfer Goal 1 (OT)    Progress/Outcome (Transfer Goal 1, OT)  --  goal partially met;discharged from facility met stand to sit and sit to stand  -FABRICIO        Dressing Goal 1 (OT)    Progress/Outcome (Dressing Goal 1, OT)  --  goal partially met;discharged from facility met socks and pants  -FABRICIO        Toileting Goal 1 (OT)    Progress/Outcome (Toileting Goal 1, OT)  --  goal not met;discharged from facility  -FABRICIO        Balance Goal 1 (OT)    Progress/Outcomes (Balance Goal 1, OT)  --  goal met  -FABRICIO       User Key  (r) = Recorded By, (t) = Taken By, (c)  = Cosigned By    Initials Name Provider Type Discipline    Jayla De Souza, OT Occupational Therapist OT    Chris Duncan, PT Physical Therapist PT              PT Discharge Summary  Reason for Discharge: Discharge from facility  Outcomes Achieved: Patient able to partially acheive established goals  Discharge Destination: Inpatient rehabilitation facility      Chris Lux PT   3/4/2019

## 2019-03-04 NOTE — PROGRESS NOTES
Continued Stay Note  [facility]     Patient Name: Lico Diego  MRN: [id number]  Today's Date: 3/4/2019    Admit Date: 2/22/2019    Discharge Plan     Row Name 03/04/19 1102       Plan    Plan Comments  Pt to be discharged to [facility] today 3/4/19 @1300 via [facility]. Report can be called to [phone]. Pt in agreement with plan.    Final Discharge Disposition Code  62 - inpatient rehab facility    Final Note  [facility]; Report can be called to [phone]        Discharge Codes    No documentation.       Expected Discharge Date and Time     Expected Discharge Date Expected Discharge Time    Mar 4, 2019             Corrine Geller RN

## 2019-03-04 NOTE — THERAPY DISCHARGE NOTE
Acute Care - Occupational Therapy Discharge Summary  [facility]     Patient Name: Lico Diego  : 1935  MRN: [id number]    Today's Date: 3/4/2019  Onset of Illness/Injury or Date of Surgery: 19    Date of Referral to OT: 19  Referring Physician: Dr. Duarte       Admit Date: 2019        OT Recommendation and Plan    Visit Dx:    ICD-10-CM ICD-9-CM   1. Fall in home, initial encounter W19.XXXA E888.9    Y92.009 E849.0   2. Hypotension due to hypovolemia I95.89 458.8    E86.1 276.52   3. Volume depletion E86.9 276.50   4. Gastroenteritis K52.9 558.9   5. Acute renal failure superimposed on chronic kidney disease, unspecified CKD stage, unspecified acute renal failure type (CMS/[facility]) N17.9 584.9    N18.9 585.9   6. Closed fracture of right ankle, initial encounter S82.891A 824.8   7. Bimalleolar ankle fracture, right, closed, initial encounter S82.841A 824.4   8. Syndesmotic disruption of right ankle, initial encounter S93.431A 845.03   9. Impaired functional mobility, balance, gait, and endurance Z74.09 V49.89   10. Impaired mobility and ADLs Z74.09 799.89   11. Acute on chronic systolic congestive heart failure (CMS/[facility]) I50.23 428.23     428.0   12. Coronary artery disease involving native coronary artery of native heart with angina pectoris (CMS/HCC) I25.119 414.01     413.9         Time Calculation- OT     Row Name 19             Time Calculation- OT    OT Start Time  947  -FABRICIO      OT Received On  19  -FABRICIO      OT Goal Re-Cert Due Date  03/10/19  -FABRICIO         Timed Charges    82074 - OT Therapeutic Exercise Minutes  12  -FABRICIO      60691 - OT Therapeutic Activity Minutes  12  -FABRICIO      24451 - OT Self Care/Mgmt Minutes  17  -FABRICIO        User Key  (r) = Recorded By, (t) = Taken By, (c) = Cosigned By    Initials Name Provider Type    Jayla De Souza, OT Occupational Therapist            Rehab Goal Summary     Row Name 19             Transfer Goal 1 (OT)     Progress/Outcome (Transfer Goal 1, OT)  goal partially met;discharged from facility met stand to sit and sit to stand  -FABRICIO         Dressing Goal 1 (OT)    Progress/Outcome (Dressing Goal 1, OT)  goal partially met;discharged from facility met socks and pants  -FABRICIO         Toileting Goal 1 (OT)    Progress/Outcome (Toileting Goal 1, OT)  goal not met;discharged from facility  -FABRICIO         Balance Goal 1 (OT)    Progress/Outcomes (Balance Goal 1, OT)  goal met  -FABRICIO        User Key  (r) = Recorded By, (t) = Taken By, (c) = Cosigned By    Initials Name Provider Type Discipline    Jayla De Souza, OT Occupational Therapist OT          Outcome Measures     Row Name 03/04/19 0947 03/03/19 0945 03/02/19 1114       How much help from another person do you currently need...    Turning from your back to your side while in flat bed without using bedrails?  --  4  -CD  3  -AA    Moving from lying on back to sitting on the side of a flat bed without bedrails?  --  3  -CD  3  -AA    Moving to and from a bed to a chair (including a wheelchair)?  --  3  -CD  3  -AA    Standing up from a chair using your arms (e.g., wheelchair, bedside chair)?  --  2  -CD  2  -AA    Climbing 3-5 steps with a railing?  --  1  -CD  1  -AA    To walk in hospital room?  --  1  -CD  1  -AA    AM-[facility] 6 Clicks Score  --  14  -CD  13  -AA       How much help from another is currently needed...    Putting on and taking off regular lower body clothing?  3  -FABRICIO  --  --    Bathing (including washing, rinsing, and drying)  3  -FABRICIO  --  --    Toileting (which includes using toilet bed pan or urinal)  3  -FABRICIO  --  --    Putting on and taking off regular upper body clothing  4  -FABRICIO  --  --    Taking care of personal grooming (such as brushing teeth)  3  -FABRICIO  --  --    Eating meals  4  -FABRICIO  --  --    Score  20  -FABRICIO  --  --       Functional Assessment    Outcome Measure Options  AM-PAC 6 Clicks Daily Activity (OT)  -FABRICIO  AM-[facility] 6 Clicks Basic Mobility (PT)  -CD  AM-[facility]  6 Clicks Basic Mobility (PT)  -AA      User Key  (r) = Recorded By, (t) = Taken By, (c) = Cosigned By    Initials Name Provider Type    Jayla De Souza, OT Occupational Therapist    Reshma Ortiz, PT Physical Therapist    AA Katharine Waite, PT Physical Therapist          Therapy Suggested Charges     Code   Minutes Charges    10893 (CPT®) Hc Ot Neuromusc Re Education Ea 15 Min      73511 (CPT®) Hc Ot Ther Proc Ea 15 Min 12 1    25899 (CPT®) Hc Ot Therapeutic Act Ea 15 Min 12 1    73757 (CPT®) Hc Ot Manual Therapy Ea 15 Min      66842 (CPT®) Hc Ot Iontophoresis Ea 15 Min      08584 (CPT®) Hc Ot Elec Stim Ea-Per 15 Min      07511 (CPT®) Hc Ot Ultrasound Ea 15 Min      06305 (CPT®) Hc Ot Self Care/Mgmt/Train Ea 15 Min 17 1    Total  41 3          Therapy Charges for Today     Code Description Service Date Service Provider Modifiers Qty    70493951940 HC OT THER PROC EA 15 MIN 3/4/2019 Jayla Arellano, OT GO 1    47660173361 HC OT THERAPEUTIC ACT EA 15 MIN 3/4/2019 Jayla Arellano, OT GO 1    13932866790 HC OT SELF CARE/MGMT/TRAIN EA 15 MIN 3/4/2019 Jayla Arellano, OT GO 1          OT Discharge Summary  Anticipated Discharge Disposition (OT): inpatient rehabilitation facility  Reason for Discharge: Discharge from facility  Outcomes Achieved: Patient able to partially acheive established goals      Jayla Arellano OT  3/4/2019

## 2019-03-04 NOTE — PLAN OF CARE
Problem: Patient Care Overview  Goal: Plan of Care Review  Outcome: Ongoing (interventions implemented as appropriate)   03/04/19 2581   Coping/Psychosocial   Plan of Care Reviewed With patient;family   Plan of Care Review   Progress improving   OTHER   Outcome Summary Pt. partially met 2 goals and met 1 goal. Pt. issued AE to help with toileting, LBD and bathing and with increased independence. Pt. supervision OOB and CGA with transfers today. Pt. pland to IPR today.

## 2019-03-04 NOTE — PROGRESS NOTES
"          Orthopaedic Surgery Progress Note      LOS: 10 days   Patient Care Team:  Khai Menjivar DO as PCP - General (Family Medicine)  Ti Santiago MD as Consulting Physician (Interventional Cardiology)    POD 9    Subjective     Interval History:   Patient doing well this morning.  His son is at the bedside.  He is eager to get home and out of the hospital.  Denies having pain in the ankle.    Objective     Vital Signs:  Temp (24hrs), Av.9 °F (36.6 °C), Min:97.5 °F (36.4 °C), Max:98.4 °F (36.9 °C)    /62 (BP Location: Right arm, Patient Position: Lying)   Pulse 69   Temp 97.5 °F (36.4 °C) (Oral)   Resp 18   Ht 180.3 cm (71\")   Wt 124 kg (273 lb 4.8 oz)   SpO2 93%   BMI 38.12 kg/m²     Labs:  Lab Results (last 24 hours)     Procedure Component Value Units Date/Time    POC Glucose Once [[id number]]  (Abnormal) Collected:  19    Specimen:  Blood Updated:  19     Glucose 201 mg/dL     Basic Metabolic Panel [[id number]]  (Abnormal) Collected:  19 040    Specimen:  Blood Updated:  19 050     Glucose 131 mg/dL      BUN 39 mg/dL      Creatinine 1.43 mg/dL      Sodium 133 mmol/L      Potassium 4.5 mmol/L      Chloride 99 mmol/L      CO2 28.0 mmol/L      Calcium 8.5 mg/dL      eGFR Non African Amer 47 mL/min/1.73      BUN/Creatinine Ratio 27.3     Anion Gap 6.0 mmol/L     Narrative:       National [employer] Guidelines    Stage     Description        GFR  1         Normal or High     90+  2         Mild decrease      60-89  3         Moderate decrease  30-59  4         Severe decrease    15-29  5         Kidney failure     <15    The MDRD GFR formula is only valid for adults with stable renal function between ages 18 and 70.    POC Glucose Once [[id number]]  (Abnormal) Collected:  19    Specimen:  Blood Updated:  19     Glucose 182 mg/dL     POC Glucose Once [[id number]]  (Abnormal) Collected:  19 1632    Specimen:  Blood Updated:  " 03/03/19 1635     Glucose 160 mg/dL     POC Glucose Once [[id number]]  (Abnormal) Collected:  03/03/19 1226    Specimen:  Blood Updated:  03/03/19 1230     Glucose 273 mg/dL           Physical Exam:  Splint in place  Toes pink and warm    Assessment/Plan   Postop day #9 status post ORIF right bimalleolar ankle fracture and ORIF of the right syndesmosis.       Patient is ready for transfer from an orthopedic standpoint.  Will defer on timing to the hospitalist who are managing his fluid balance and renal function.    Continue PT and OT     Continue low-dose Lovenox for DVT prophylaxis for 4 weeks total     Upon discharge, patient will need follow-up in my PA clinic on 3/12/19 for a wound check, suture removal, and placement into a short leg cast.  Anticipate keeping him casted longer than normal because of the increased risk of Charcot arthropathy due to his diabetic peripheral neuropathy.    Ken Abreu MD  03/04/19  11:23 AM

## 2019-03-04 NOTE — DISCHARGE SUMMARY
[facility] Medicine Services  DISCHARGE SUMMARY    Patient Name: Lico Diego  : 1935  MRN: [id number]    Date of Admission: 2019  Date of Discharge:  19  Primary Care Physician: Khai Menjivar DO    Consults     Date and Time Order Name Status Description    2019 0925 Inpatient Palliative Care MD Consult Completed     2019 0901 Inpatient Cardiology Consult Completed     2019 2310 Inpatient Orthopedic Surgery Consult      2019 1734 Inpatient Nephrology Consult            Hospital Course     Presenting Problem:   Fall in home, initial encounter [W19.XXXA, Y92.009]    Active Hospital Problems    Diagnosis Date Noted   • **Bimalleolar ankle fracture, right, closed, initial encounter [S82.841A] 2019   • Premature ventricular contractions (PVCs) (VPCs) [I49.3] 2019   • JC (obstructive sleep apnea) [G47.33] 2019   • Coronary artery disease involving native coronary artery of native heart with angina pectoris (CMS/Formerly [facility]) [I25.119] 2019   • CKD (chronic kidney disease) stage 3, GFR 30-59 ml/min (CMS/Formerly [facility]) [N18.3] 2019   • Ischemic cardiomyopathy [I25.5] 2019   • Acute on chronic systolic congestive heart failure (CMS/Formerly [facility]) [I50.23] 2019   • Severe obesity (BMI 35.0-35.9 with comorbidity) (CMS/Formerly [facility]) [E66.01, Z68.35] 2019   • Type 2 diabetes mellitus with diabetic polyneuropathy, with long-term current use of insulin (CMS/Formerly [facility]) [E11.42, Z79.4] 2019   • COPD (chronic obstructive pulmonary disease) (CMS/Formerly [facility]) [J44.9] 2019   • Essential hypertension [I10] 2019      Resolved Hospital Problems    Diagnosis Date Noted Date Resolved   • Acute respiratory failure with hypoxia (CMS/Formerly [facility]) [J96.01] 2019   • Demand ischemia (CMS/Formerly [facility]) [I24.8] 2019   • AKILA (acute kidney injury) (CMS/Formerly [facility]) [N17.9] 2019          Hospital Course:  Ms. Diego is a 83 year old man  with history of COPD, CAD s/p CABG, chronic systolic heart failure who was admitted 2/22 with closed right ankle fracture. Found to have AKILA on CKD. Resp status decompensated after ORIF 2/23 and he was placed on BiPAP. Found to have elevated BNP and trop likely demand related. He was admitted to the ICU and was diuresed. Cardiology and nephrology consulted. Patient transferred to medicine service 2/28. Kidney function fluctuated and diureses was held for 2 days. It was resumed at lower dose of lasix 20 mg daily (home dose 20 mg BID).      Bimalleolar ankle fracutre   - s/p repair 2/23. Ortho followed while inpatient. He is non-weight bearing except for for transfers. He should have follow up 3/12 with Dr. Whelan [facility].       Acute resp failure with hypoxia - resolved   - patient required intermittent BiPAP and diuresis. He has remained on room air or 2L. He was put on a fluid restriction and lasix 20 mg daily started. Recommend continuing duo-nebs.      Acute on chronic systolic heart failure   Ischemic cardiomyopathy   - Cards evaluated while inpatient and ECHO with EF of 45%. He was started on losartan and metoprolol was continued. Noninvasive eval and consideration of starting entresto can be done as an outpatient with Dr. Govea in 4 weeks. Recommend continuing daily weights and fluid restriction of 6583-9661 ml. Recommend resuming BID dosing of lasix if weight trending up.      CKD 3  - Cr fluctuated following diuresis. He was started on ARB and kidney function tolerated. Nephrology did see patient while inpatient. Diuresis was held and kidney function improved. Lasix was restarted on 20 mg daily and kidney function tolerated. Recommend continuing daily weights. Recommend repeat BMP in 3-5 days to monitor. May need addition of lasix BID. If this is the case, would recommend monitoring with BMP. Nephrology follow up in 4 weeks.      COPD   - Scheduled duo-nebs while inpatient. Symbicort was started.       Demand ischemia   - continued ASA, statin, metoprolol. He was evaluated by Cardiology. Noninvasive ischemic eval can be done as outpatient.      HTN   - continued isordil and hydralazine and metoprolol. Lisinopril was changed to losartan with control.      DM    - Metformin and glipizide was held on admission and will continue to hold. Discussed with patient and family that I would recommend insulin moving forward unless kidney function stabilizes for a period of time. Discharged on home long acting insulin and lispro as well as SSI coverage. He may need continued titration at rehab.      Obesity   - Complicated all aspects of care       Discharge Follow Up Recommendations for labs/diagnostics:  PCP 1 week after discharge from rehab   Dr. Ordoñez 3/12/19 in [facility] for wound check and suture removal   Dr. Govea Cardiology 4 weeks   Nephrology 4 weeks     Recommend repeating BMP in the next 3-5 days to monitor kidney function  Recommend daily weights      Day of Discharge     HPI:   No acute events. States he is feeling good today. Breathing is doing well. Reviewed medication changes with daughter and patient. Planning for and agreeable to discharge to rehab today.     Review of Systems  Gen- No fevers, chills  CV- No chest pain, palpitations  Resp- No cough, dyspnea  GI- No N/V/D, abd pain    Otherwise ROS is negative except as mentioned in the HPI.    Vital Signs:   Temp:  [97.5 °F (36.4 °C)-98.4 °F (36.9 °C)] 97.5 °F (36.4 °C)  Heart Rate:  [64-72] 69  Resp:  [17-20] 18  BP: (133-163)/(60-88) 147/62     Physical Exam:  Constitutional: No acute distress, awake, alert  HENT: NCAT, mucous membranes moist  Respiratory: Clear to auscultation bilaterally, respiratory effort normal; occ wheeze   Cardiovascular: RRR, II/VI murmurs, no rubs, or gallops, palpable pedal pulses bilaterally  Gastrointestinal: Positive bowel sounds, soft, nontender, nondistended  Musculoskeletal: trace bilateral ankle edema; right  lower leg in splint   Psychiatric: Appropriate affect, cooperative  Neurologic: Oriented x 3, strength symmetric in all extremities, Cranial Nerves grossly intact to confrontation, speech clear  Skin: No rashes      Pertinent  and/or Most Recent Results     Results from last 7 days   Lab Units 03/04/19  0402 03/03/19  0342 03/02/19  0651 03/01/19  0600 02/28/19  1613 02/28/19  0535 02/27/19  0337  02/26/19  0014   WBC 10*3/mm3  --   --   --   --   --  8.45 7.00  --  7.02   HEMOGLOBIN g/dL  --   --   --   --   --  9.3* 9.2*  --  9.7*   HEMATOCRIT %  --   --   --   --   --  28.6* 28.5*  --  30.2*   PLATELETS 10*3/mm3  --   --   --   --   --  244 229  --  183   SODIUM mmol/L 133 133 133 128* 128* 130* 132   < >  --    POTASSIUM mmol/L 4.5 4.8 4.4 4.1 4.5 3.9 4.1   < >  --    CHLORIDE mmol/L 99 99 100 97* 94* 97* 99   < >  --    CO2 mmol/L 28.0 27.0 25.0 24.0 26.0 24.0 24.0   < >  --    BUN mg/dL 39* 53* 56* 48* 46* 37* 40*   < >  --    CREATININE mg/dL 1.43* 1.68* 1.74* 1.80* 1.92* 1.50* 1.59*   < >  --    GLUCOSE mg/dL 131* 188* 132* 110* 256* 186* 173*   < >  --    CALCIUM mg/dL 8.5* 8.4* 8.3* 8.1* 8.2* 8.3* 8.6*   < >  --     < > = values in this interval not displayed.     Results from last 7 days   Lab Units 02/27/19 0337 02/26/19  0016   BILIRUBIN mg/dL 0.9 0.7   ALK PHOS U/L 67 71   ALT (SGPT) U/L 14 18   AST (SGOT) U/L 24 28           Invalid input(s): TG, LDLCALC, LDLREALC  Results from last 7 days   Lab Units 02/28/19  0535 02/27/19  0337 02/26/19  0951 02/26/19  0424 02/26/19  0016  02/25/19  1730 02/25/19  1407   BNP pg/mL 546.0* 593.0* 657.0*  --   --    < >  --   --    TROPONIN I ng/mL  --   --  0.521* 0.631* 0.947*  --  1.183* 1.026*    < > = values in this interval not displayed.       Brief Urine Lab Results  (Last result in the past 365 days)      Color   Clarity   Blood   Leuk Est   Nitrite   Protein   CREAT   Urine HCG        02/28/19 2131 Yellow Clear Negative Negative Negative Negative                Microbiology Results Abnormal     None          Imaging Results (all)     Procedure Component Value Units Date/Time    XR Elbow 3+ View Left [[id number]] Collected:  02/28/19 0824     Updated:  03/01/19 1549    Narrative:       EXAMINATION: XR ELBOW 3+ VW LEFT- 02/27/2019     INDICATION: medial sided elbow pain after a fall; W19.XXXA-Unspecified  fall, initial encounter; Y92.009-Unspecified place in unspecified  non-institutional (private) residence as the place of occurrence of the  external cause; I95.89-Other hypotension; E86.1-Hypovolemia;  E86.9-Volume depletion, unspecified; K52.9-Noninfective gastroenteritis  and colitis, unspecified; N17.9-Acute kidney failure, unspecified     COMPARISON: NONE     FINDINGS: AP, lateral and lateral oblique imaging of the left elbow  without acute fracture or cortical displacement of irregularity  identified. No elbow joint effusion. Degenerative changes are noted  throughout the elbow joint space with joint space narrowing however no  significant erosive component or radiopaque foreign body in the soft  tissues. Angiocatheter overlies the antecubital fossa.           Impression:       No acute fracture or osseous malalignment of the left elbow  with degenerative changes noted throughout. No elbow joint effusion to  suggest underlying occult injury. If symptoms persist and/or progress  further imaging may be useful.     D:  02/28/2019  E:  02/28/2019     This report was finalized on 3/1/2019 3:47 PM by Dr. Santiago aSucedo.       XR Chest 1 View [[id number]] Collected:  02/27/19 0914     Updated:  02/27/19 1119    Narrative:       EXAMINATION: XR CHEST 1 VW- 02/27/2019      INDICATION: hypoxia; W19.XXXA-Unspecified fall, initial encounter;  Y92.009-Unspecified place in unspecified non-institutional (private)  residence as the place of occurrence of the external cause; I95.89-Other  hypotension; E86.1-Hypovolemia; E86.9-Volume depletion, unspecified;  K52.9-Noninfective  gastroenteritis and colitis, unspecified; N17.9-Acute  kidney failure, unspecified; N18.9-Chronic kidney disease, u      COMPARISON: 02/25/2019     FINDINGS: The cardiac silhouette is normal. There are postoperative  cardiac changes. There is minimal bibasilar airspace disease. There has  been no significant change.        Impression:       There has been no significant change since the previous  examination.     D:  02/27/2019  E:  02/27/2019     This report was finalized on 2/27/2019 11:17 AM by Dr. Wilman Dougherty MD.       DooBop Renal Limited [[id number]] Collected:  02/24/19 1105     Updated:  02/26/19 0943    Narrative:       EXAMINATION: US RENAL LIMITED - 2/24/2019     INDICATION: W19.XXXA-Unspecified fall, initial encounter;  Y92.009-Unspecified place in unspecified non-institutional (private)  residence as the place of occurrence of the external cause; I95.89-Other  hypotension; E86.1-Hypovolemia; E86.9-Volume depletion, unspecified;  K52.9-Noninfective gastroenteritis and colitis, unspecified; N17.9-Acute  kidney failure, unspecified; N18.9-Chronic kidney disease . . .       TECHNIQUE: Ultrasound kidneys and urinary bladder.     COMPARISON: NONE     FINDINGS: Right kidney measures 11.9 cm in length with renal cortical  thinning and increased echogenicity of the parenchyma compared to the  adjacent liver indicating medical renal disease. No hydronephrosis or  obvious calculi. Left kidney measures 11.47 cm in length with renal  cortical thinning and increased echogenicity of the parenchyma  indicating medical renal disease however no hydronephrosis or obvious  calculi.     Urinary bladder has echogenic debris along the posterior wall without  distention.       Impression:       1. Increased echogenicity of the renal parenchyma indicating medical  renal disease involvement without hydronephrosis.  2. Echogenic material within the posterior portion of the dependent  urinary bladder may represent debris or calculi.       DICTATED:   2/24/2019  EDITED/ls :   2/24/2019      This report was finalized on 2/26/2019 9:41 AM by Dr. Santiago Saucedo.       FL C Arm During Surgery [[id number]] Collected:  02/23/19 1545     Updated:  02/26/19 0904    Narrative:       EXAMINATION: FL C ARM DURING SURGERY - 2/23/2019     INDICATION: W19.XXXA-Unspecified fall, initial encounter;  Y92.009-Unspecified place in unspecified non-institutional (private)  residence as the place of occurrence of the external cause; I95.89-Other  hypotension; E86.1-Hypovolemia; E86.9-Volume depletion, unspecified;  K52.9-Noninfective gastroenteritis and colitis, unspecified; N17.9-Acute  kidney failure, unspecified.     TECHNIQUE: Intraoperative fluoroscopy for improved localization and  treatment planning.     COMPARISON: Radiographs 2/22/2019.     FINDINGS: Intraoperative fluoroscopy with total fluoroscopic time usage  37 seconds and 6 representative images saved during right ankle open  reduction and internal fixation       Impression:       Intraoperative fluoroscopy utilized for right ankle ORIF.     DICTATED:   2/23/2019  EDITED/ls :   2/23/2019          This report was finalized on 2/26/2019 9:02 AM by Dr. Santiago Saucedo.       XR Chest 1 View [[id number]] Collected:  02/25/19 0934     Updated:  02/25/19 1045    Narrative:       EXAMINATION: XR CHEST 1 VW-02/25/2019:      INDICATION: RRT; W19.XXXA-Unspecified fall, initial encounter;  Y92.009-Unspecified place in unspecified non-institutional (private)  residence as the place of occurrence of the external cause; I95.89-Other  hypotension; E86.1-Hypovolemia; E86.9-Volume depletion, unspecified;  K52.9-Noninfective gastroenteritis and colitis, unspecified; N17.9-Acute  kidney failure, unspecified; N18.9-Chronic kidney disease, unspecified.      COMPARISON: 02/23/2019.     FINDINGS: Portable chest reveals cardiac and mediastinal silhouettes to  be within normal limits. Mild increased markings seen at the right lung  base  slightly worsening in the interval. The increased pulmonary  vascularity is also slightly worsened in the interval. There is no  definite pneumothorax. Small right pleural effusion.           Impression:       Worsening of the pulmonary vascularity in the interval with  worsening of the markings at the right lung base. Small right pleural  effusion present.     D:  02/25/2019  E:  02/25/2019     This report was finalized on 2/25/2019 10:43 AM by Dr. Yamilet Willis MD.       XR Chest PA & Lateral [[id number]] Collected:  02/23/19 0843     Updated:  02/23/19 1843    Narrative:          EXAMINATION: XR CHEST, PA AND LATERAL - 2/23/2019      INDICATION: W19.XXXA-Unspecified fall, initial encounter;  Y92.009-Unspecified place in unspecified non-institutional (private)  residence as the place of occurrence of the external cause; I95.89-Other  hypotension; E86.1-Hypovolemia; E86.9-Volume depletion, unspecified;  K52.9-Noninfective gastroenteritis and colitis, unspecified; N17.9-Acute  kidney failure . . .      COMPARISON: 2/22/2019     FINDINGS: Chronic lung changes again noted with bibasilar atelectasis  and/or scarring greatest on the left. Calcification seen on prior  comparison involving the left mid and lower lung is not as evident on  lateral as expected may represent resolved atelectasis or background  scarring. If there is further concern for underlying pleural lesion, CT  may be considered when clinically appropriate.       Impression:       Chronic lung changes again noted with bibasilar atelectasis  and/or scarring greatest on the left. Calcification seen on prior  comparison involving the left mid and lower lung is not as evident on  lateral as expected and may represent resolved atelectasis or background  scarring. If there is further concern for underlying pleural lesion, CT  may be considered when clinically appropriate.     DICTATED:   2/23/2019  EDITED/ls :   2/23/2019      This report was finalized on  2/23/2019 6:41 PM by Dr. Santiago Saucedo.       XR Ankle 3+ View Right [[id number]] Collected:  02/22/19 1452     Updated:  02/22/19 2250    Narrative:       EXAMINATION: XR ANKLE 3+ VW, RIGHT-02/22/2019:      INDICATION: Injury after a fall.      COMPARISON: NONE.     FINDINGS: There is a minimally displaced spiral fracture of the distal  fibula. The lateral view appears to show a fracture line which could  represent either the spiral fracture of the fibula or possibly a  posterior malleolar fracture of the distal tibia. The cortex of the  posterior malleolus appears irregular, favoring posterior malleolar  trauma. No medial malleolar trauma is identified but there is widened  medial joint space, suggesting underlying ligamentous injury. Lateral  soft tissue swelling is seen. No acute trauma is identified elsewhere.       Impression:       Distal fibular fracture, suspected posterior malleolar  fracture, and suspected medial ligamentous injury.     D:  02/22/2019  E:  02/22/2019     This report was finalized on 2/22/2019 10:48 PM by DR. Brijesh Gomes MD.       XR Chest 1 View [[id number]] Collected:  02/22/19 1415     Updated:  02/22/19 2248    Narrative:       EXAMINATION: XR CHEST 1 VW- 02/22/2019      INDICATION: Weak/Dizzy/AMS triage protocol      COMPARISON: NONE     FINDINGS: Patient history indicates fall and dizziness.      Sternotomy wires are noted. The heart and vasculature are normal in  size. Lungs are hyperinflated and show mild chronic appearing changes.  There is some dense, well-defined curvilinear and linear opacity in the  left mid and lower lung favored to represent granulomatous  calcifications or pleural plaquing. No clearly acute disease is seen.       Impression:       Hyperinflated lungs, and probable calcified plaque in the  left mid and lower lung. When convenient, follow-up with a standard 2  view chest radiograph should be considered for further characterization.     D:  02/22/2019  E:   02/22/2019     This report was finalized on 2/22/2019 10:46 PM by DR. Brijesh Gomes MD.       XR Knee 1 or 2 View Right [[id number]] Collected:  02/22/19 1531     Updated:  02/22/19 2241    Narrative:       EXAMINATION: XR KNEE 1 OR 2 VW, RIGHT-02/22/2019:      INDICATION: Pain after a fall.      COMPARISON: NONE.     FINDINGS: History indicates fall and right knee pain, site not  specified. Bones of the knee joint appear anatomic and intact. No  fracture, avulsion, or significant plain film evidence of knee joint  effusion is seen. Only minor degenerative changes are appreciated.  Vascular clips and vascular calcifications are incidentally noted.       Impression:       No evidence of acute right knee trauma.     D:  02/22/2019  E:  02/22/2019     This report was finalized on 2/22/2019 10:39 PM by DR. Brijesh Gomes MD.                       Results for orders placed during the hospital encounter of 02/22/19   Adult Transthoracic Echo Complete W/ Cont if Necessary Per Protocol    Narrative · Left ventricular systolic function is low normal. Estimated EF = 45%.   Difficult to accurately assess regional wall motion abnormality and EF due   to frequent ventricular ectopy.  · Left ventricular diastolic dysfunction (grade I) consistent with   impaired relaxation.  · Left atrial cavity size is mildly dilated.  · Mild mitral valve regurgitation is present  · Moderate tricuspid valve regurgitation is present.  · Estimated right ventricular systolic pressure from tricuspid   regurgitation is markedly elevated (64 mmHg).            Discharge Details        Discharge Medications      New Medications      Instructions Start Date   budesonide-formoterol 80-4.5 MCG/ACT inhaler  Commonly known as:  SYMBICORT   2 puffs, Inhalation, 2 Times Daily - RT      docusate sodium 100 MG capsule   100 mg, Oral, 2 Times Daily PRN      fluticasone 50 MCG/ACT nasal spray  Commonly known as:  FLONASE   2 sprays, Each Nare, Daily       HYDROcodone-acetaminophen 5-325 MG per tablet  Commonly known as:  NORCO   1 tablet, Oral, Every 6 Hours PRN      insulin lispro 100 UNIT/ML injection  Commonly known as:  humaLOG   0-9 Units, Subcutaneous, 4 Times Daily With Meals & Nightly      insulin lispro 100 UNIT/ML injection  Commonly known as:  humaLOG   7 Units, Subcutaneous, 3 Times Daily With Meals      ipratropium-albuterol 0.5-2.5 mg/3 ml nebulizer  Commonly known as:  DUO-NEB   3 mL, Nebulization, 4 Times Daily - RT      losartan 25 MG tablet  Commonly known as:  COZAAR   25 mg, Oral, Every 24 Hours Scheduled      nitroglycerin 0.4 MG SL tablet  Commonly known as:  NITROSTAT   0.4 mg, Sublingual, Every 5 Minutes PRN, Take no more than 3 doses in 15 minutes.      polyethylene glycol pack packet  Commonly known as:  MIRALAX   17 g, Oral, 2 Times Daily PRN         Changes to Medications      Instructions Start Date   furosemide 20 MG tablet  Commonly known as:  LASIX  What changed:    · medication strength  · how much to take  · when to take this   20 mg, Oral, Daily      metoprolol succinate  MG 24 hr tablet  Commonly known as:  TOPROL-XL  What changed:    · medication strength  · how much to take   100 mg, Oral, Daily         Continue These Medications      Instructions Start Date   albuterol (2.5 MG/3ML) 0.083% nebulizer solution  Commonly known as:  PROVENTIL   2.5 mg, Nebulization, Every 8 Hours PRN      albuterol sulfate  (90 Base) MCG/ACT inhaler  Commonly known as:  PROVENTIL HFA;VENTOLIN HFA;PROAIR HFA   1-2 puffs, Inhalation, Every 4 Hours PRN      allopurinol 100 MG tablet  Commonly known as:  ZYLOPRIM   100 mg, Oral, Daily      aspirin 81 MG EC tablet   81 mg, Oral, Daily      citalopram 10 MG tablet  Commonly known as:  CeleXA   10 mg, Oral, Daily      ondansetron ODT 8 MG disintegrating tablet  Commonly known as:  ZOFRAN-ODT   8 mg, Oral, Every 8 Hours PRN      TOUJEO MAX SOLOSTAR 300 UNIT/ML solution pen-injector  Generic  drug:  Insulin Glargine   17 Units, Subcutaneous, Nightly      vitamin B-12 1000 MCG tablet  Commonly known as:  CYANOCOBALAMIN   1,000 mcg, Oral, Daily      ZOCOR 20 MG tablet  Generic drug:  simvastatin   20 mg, Oral, Nightly         Stop These Medications    glipiZIDE 2.5 MG 24 hr tablet  Commonly known as:  GLUCOTROL XL     lisinopril 20 MG tablet  Commonly known as:  PRINIVIL,ZESTRIL     metFORMIN 500 MG tablet  Commonly known as:  GLUCOPHAGE            No Known Allergies      Discharge Disposition:  Skilled Nursing Facility (DC - External)    Discharge Diet:  Diet Order   Procedures   • Diet Regular; Cardiac, Consistent Carbohydrate, Daily Fluid Restriction, Renal; 1000 mL Fluid Per Day         Discharge Activity:   Activity Instructions     Discharge Activity Restrictions      Non-weight bearing except may bear weight with transfers only            CODE STATUS:    Code Status and Medical Interventions:   Ordered at: 02/26/19 1233     Level Of Support Discussed With:    Health Care Surrogate     Code Status:    CPR     Medical Interventions (Level of Support Prior to Arrest):    Full     Comments:    per daughter and living will.  Please verify above with patient and daughter.         Future Appointments   Date Time Provider Department Center   4/9/2019  3:20 PM Ysabel Gutierrez APRN [facility] None       Additional Instructions for the Follow-ups that You Need to Schedule     Discharge Follow-up with PCP   As directed       Currently Documented PCP:    Khai Menjivar DO    PCP Phone Number:    [phone]     Follow Up Details:  PCP 1 week after discharge from rehab         Discharge Follow-up with Specialty: Nephrology 4 weeks   As directed      Specialty:  Nephrology 4 weeks         Discharge Follow-up with Specialty: Hebert Govea; 1 Month   As directed      Specialty:  Hebert Govea    Follow Up:  1 Month    Follow Up Details:  Hospital FU for CHF, PVCs, and CAD         Discharge Follow-up  with Specified Provider: Dr. Govea Cardiology 4 weeks   As directed      To:  Dr. Govea Cardiology 4 weeks         Discharge Follow-up with Specified Provider: Dr. Ordoñez [facility] 3/12/19   As directed      To:  Dr. Ordo[facility]ez [facility] 3/12/19               Time Spent on Discharge:  45 minutes    Electronically signed by Lisset Vigil DO, 03/04/19, 10:09 AM.

## 2019-03-04 NOTE — PROGRESS NOTES
"   LOS: 10 days    Patient Care Team:  Khai Menjivar DO as PCP - General (Family Medicine)  Ti Santiago MD as Consulting Physician (Interventional Cardiology)    Subjective     No acute events overnight. No new complaints.     Objective     Vital Signs:  Blood pressure 147/62, pulse 69, temperature 97.5 °F (36.4 °C), temperature source Oral, resp. rate 18, height 180.3 cm (71\"), weight 124 kg (273 lb 4.8 oz), SpO2 93 %.    Flowsheet Rows      First Filed Value   Admission Height  180.3 cm (71\") Documented at 02/22/2019 1200   Admission Weight  119 kg (263 lb) Documented at 02/22/2019 1200          03/03 0701 - 03/04 0700  In: 1551 [P.O.:1551]  Out: 2375 [Urine:2375]    Physical Exam:    General Appearance: NAD WD obese WM  Neuro:   awake alert   Psych:  Normal mood and affect  CV:  RRR,  no edema  Lungs:  Mild increased WOB,  + rhonchi  Abdomen: not distended, bowel sounds present  :  No mcdonald, no palp bladder  Skin: stasis dermatitis left lower ext, right in bandage, Warm and dry      Labs:  Results from last 7 days   Lab Units 02/28/19  0535 02/27/19  0337 02/26/19  0014   WBC 10*3/mm3 8.45 7.00 7.02   HEMOGLOBIN g/dL 9.3* 9.2* 9.7*   PLATELETS 10*3/mm3 244 229 183     Results from last 7 days   Lab Units 03/04/19  0402 03/03/19  0342 03/02/19  0651 03/01/19  0600  02/28/19  0535 02/27/19  0337 02/26/19  0951  02/26/19  0016   SODIUM mmol/L 133 133 133 128*   < > 130* 132  --    < > 133   POTASSIUM mmol/L 4.5 4.8 4.4 4.1   < > 3.9 4.1  --    < > 4.3   CHLORIDE mmol/L 99 99 100 97*   < > 97* 99  --    < > 101   CO2 mmol/L 28.0 27.0 25.0 24.0   < > 24.0 24.0  --    < > 24.0   BUN mg/dL 39* 53* 56* 48*   < > 37* 40*  --    < > 44*   CREATININE mg/dL 1.43* 1.68* 1.74* 1.80*   < > 1.50* 1.59*  --    < > 1.69*   CALCIUM mg/dL 8.5* 8.4* 8.3* 8.1*   < > 8.3* 8.6*  --    < > 8.6*   PHOSPHORUS mg/dL  --   --   --   --   --   --  2.6 2.3*  --   --    MAGNESIUM mg/dL  --   --   --   --   --  2.2 2.2 1.8  --  2.0 "   ALBUMIN g/dL  --   --   --   --   --   --  3.67  --   --  3.76    < > = values in this interval not displayed.     Results from last 7 days   Lab Units 02/27/19  0337   ALK PHOS U/L 67   BILIRUBIN mg/dL 0.9   ALT (SGPT) U/L 14   AST (SGOT) U/L 24     Results from last 7 days   Lab Units 02/26/19  0016   PH, ARTERIAL pH units 7.356   PO2 ART mm Hg 60.5*   PCO2, ARTERIAL mm Hg 41.1   HCO3 ART mmol/L 23.0     Results from last 7 days   Lab Units 02/28/19  2131   COLOR UA  Yellow   CLARITY UA  Clear   PH, URINE  <=5.0   SPECIFIC GRAVITY, URINE  1.020   GLUCOSE UA  Negative   KETONES UA  Negative   BILIRUBIN UA  Negative   PROTEIN UA  Negative   BLOOD UA  Negative   LEUKOCYTES UA  Negative   NITRITE UA  Negative       Estimated Creatinine Clearance: 53.4 mL/min (A) (by C-G formula based on SCr of 1.43 mg/dL (H)).         A/P:    ARF:  Non-oliguric  secondary to decreased IVV, vomiting,Slighty better after discontinuing diuresis - Improving.     CKD3:  secondary to nephrosclerosis.  Baseline 1.7-1.8 - Scr 1.45 today.     HTN - Controlled    Anemia - iron def.  Getting IVFe    Hyponatremia: 133 stable.       Plan:  Continue with current treatment. UOP is excellent.     Follow up with [facility] in 2 weeks with renal function panel and UA 1 week prior to appointment.     Case discussed with Family members and RN.     Della Santillan MD  03/04/19  12:27 PM

## 2019-03-04 NOTE — PLAN OF CARE
Problem: Fracture Orthopaedic (Adult)  Goal: Signs and Symptoms of Listed Potential Problems Will be Absent, Minimized or Managed (Fracture Orthopaedic)  Outcome: Ongoing (interventions implemented as appropriate)   03/04/19 4753   Goal/Outcome Evaluation   Problems Assessed (Orthopaedic Fracture) functional deficit/self-care deficit   Problems Present (Orthopaedic Fracture) skin integrity impairment

## 2019-03-04 NOTE — PLAN OF CARE
Problem: Skin Injury Risk (Adult)  Goal: Skin Health and Integrity  Outcome: Ongoing (interventions implemented as appropriate)   03/04/19 0501   Skin Injury Risk (Adult)   Skin Health and Integrity making progress toward outcome

## 2019-03-04 NOTE — PROGRESS NOTES
Case Management Discharge Note    Final Note: [facility]; Report can be called to [phone]    Destination - Selection Complete      Service Provider Request Status Selected Services Address Phone Number Fax Number    [facility] Selected Inpatient Rehabilitation [address] [address] [phone]      Durable Medical Equipment      No service has been selected for the patient.      Dialysis/Infusion      No service has been selected for the patient.      Home Medical Care      No service has been selected for the patient.      Community Resources      No service has been selected for the patient.        Ambulance: [facility]    Final Discharge Disposition Code: 62 - inpatient rehab facility

## 2019-03-12 ENCOUNTER — OFFICE VISIT (OUTPATIENT)
Dept: ORTHOPEDIC SURGERY | Facility: CLINIC | Age: 84
End: 2019-03-12

## 2019-03-12 DIAGNOSIS — Z98.890 STATUS POST ORIF OF FRACTURE OF ANKLE: Primary | ICD-10-CM

## 2019-03-12 DIAGNOSIS — Z87.81 STATUS POST ORIF OF FRACTURE OF ANKLE: Primary | ICD-10-CM

## 2019-03-12 DIAGNOSIS — S82.841D CLOSED BIMALLEOLAR FRACTURE OF RIGHT ANKLE WITH ROUTINE HEALING, SUBSEQUENT ENCOUNTER: ICD-10-CM

## 2019-03-12 PROCEDURE — 29405 APPL SHORT LEG CAST: CPT | Performed by: PHYSICIAN ASSISTANT

## 2019-03-12 PROCEDURE — 99024 POSTOP FOLLOW-UP VISIT: CPT | Performed by: PHYSICIAN ASSISTANT

## 2019-03-12 RX ORDER — HYDROXYZINE HYDROCHLORIDE 25 MG/1
25 TABLET, FILM COATED ORAL 3 TIMES DAILY PRN
COMMUNITY
End: 2019-04-09

## 2019-03-12 RX ORDER — ALOGLIPTIN 12.5 MG/1
TABLET, FILM COATED ORAL
COMMUNITY
End: 2019-04-09

## 2019-03-12 NOTE — PROGRESS NOTES
[facility] Orthopaedic Surgery Clinic Note    Subjective     Post-op of the Right Ankle (17 days post OPEN REDUCTION INTERNAL FIXATION RIGHT LATERAL MALLEOLUS FRACTURE AND SYNDESMOSIS 2/23/19)       KASIA Diego is a 83 y.o. male.  Patient presents today for initial postop visit status post right ankle open reduction internal fixation for bimalleolar fracture with placement of a syndesmotic screw performed on the above date by Dr. Abreu.  Patient with a history of diabetic neuropathy so he denies any pain to the area.  No reported fever, chills, night sweats or other constitutional symptoms.  He does note lower leg swelling.      Objective      Physical Exam  There were no vitals taken for this visit.    There is no height or weight on file to calculate BMI.        Ortho Exam  Right ankle  Skin: Surgical incision site is healing well with sutures in place.  No redness, warmth, drainage or evidence of infection.  Positive soft tissue swelling noted throughout lower extremity.  Positive 1-2+ lower leg edema.    Tenderness: No palpable tenderness on exam today.  Motor: Grossly intact anterior tib, gastroc, EHL, FHL  Sensory: Patient with a history of diabetic neuropathy denies sensation to light touch throughout foot.  Vascular: Unable to palpate dorsalis or posterior tib pulse.  According to daughter typically have to utilize Doppler in order to palpate.  Toes are pink and warm.    Imaging:  Ordered plain film imaging of the right ankle.  Images were reviewed by Dr. Abreu.  Patient is status post open reduction internal fixation for a right lateral malleolus fracture with syndesmotic screw placement.  No evidence of hardware complication or failure.  Mortise remains well aligned.  Syndesmosis shows no evidence of widening.  See chart for official report.      Assessment:  1. Status post ORIF of fracture of ankle    2. Closed bimalleolar fracture of right ankle with routine healing, subsequent encounter         Plan:  1. Patient status post open reduction internal fixation for right ankle bimalleolar fracture.  2. Sutures were removed today.  3. Placed back into a nonweightbearing short leg fiberglass cast.  4. Discussed the importance of elevation for swelling control.  5. Follow-up on 4/9/2019 for repeat evaluation to include pre-clinic imaging of the right ankle out of the cast.  6. Question and concerns answered.      Jocelyne Bruno PA-C  03/12/19  10:33 AM

## 2019-03-15 ENCOUNTER — READMISSION MANAGEMENT (OUTPATIENT)
Dept: CALL CENTER | Facility: HOSPITAL | Age: 84
End: 2019-03-15

## 2019-03-15 NOTE — OUTREACH NOTE
Prep Survey      Responses   Facility patient discharged from?  [facility]   Is patient eligible?  Yes   Discharge diagnosis  ORIF right lateral malleolus fracture and syndesmosis, Acute respiratory failure with hypoxia    Does the patient have one of the following disease processes/diagnoses(primary or secondary)?  General Surgery   Does the patient have Home health ordered?  Yes   What is the Home health agency?   [facility].   Is there a DME ordered?  No   General alerts for this patient  DC from sub acute care on 3/15   Prep survey completed?  Yes          Rakel Khan RN

## 2019-03-18 ENCOUNTER — READMISSION MANAGEMENT (OUTPATIENT)
Dept: CALL CENTER | Facility: HOSPITAL | Age: 84
End: 2019-03-18

## 2019-03-18 ENCOUNTER — TELEPHONE (OUTPATIENT)
Dept: ORTHOPEDIC SURGERY | Facility: CLINIC | Age: 84
End: 2019-03-18

## 2019-03-18 NOTE — OUTREACH NOTE
General Surgery Week 2 Survey      Responses   Facility patient discharged from?  [place]   Does the patient have one of the following disease processes/diagnoses(primary or secondary)?  General Surgery   Week 2 attempt successful?  Yes   Call start time  1025   Call end time  1035   Discharge diagnosis  ORIF right lateral malleolus fracture and syndesmosis, Acute respiratory failure with hypoxia    Person spoke with today (if not patient) and relationship  daughter (is nurse)   Meds reviewed with patient/caregiver?  Yes   Is the patient having any side effects they believe may be caused by any medication additions or changes?  No   Does the patient have all medications related to this admission filled (includes all antibiotics, pain medications, etc.)  Yes   Is the patient taking all medications as directed (includes completed medication regime)?  Yes   Medication comments  daughter is working on authorization for new med given when pt came home from [facility]. Pt has all meds ordered when D/C from hospital   Does the patient have a follow up appointment scheduled with their surgeon?  Yes   Has the patient kept scheduled appointments due by today?  N/A   What is the Home health agency?   [facility].   Has home health visited the patient within 72 hours of discharge?  Yes   Home health comments  OT and PT will start today   Psychosocial issues?  No   Did the patient receive a copy of their discharge instructions?  Yes   Nursing interventions  Reviewed instructions with patient   What is the patient's perception of their health status since discharge?  Improving   Nursing interventions  Nurse provided patient education   Is the patient /caregiver able to teach back basic post-op care?  Take showers only when approved by MD-sponge bathe until then, Continue use of incentive spirometry at least 1 week post discharge, No tub bath, swimming, or hot tub until instructed by MD   Is the patient/caregiver  "able to teach back steps to recovery at home?  Rest and rebuild strength, gradually increase activity   Additional teach back comments  Pt released from [facility] on 3/15. Pt says he is doing \"very good\". He has fiberglass cast and boot. no pain. Sleeping well. Wraps cast to take a shower. Daughter is nurse   Week 2 call completed?  Yes          Mireille Chiu RN  "

## 2019-03-18 NOTE — TELEPHONE ENCOUNTER
THERAPIST WAS CALLING TO GET VERBAL ORDERS FOR THIS PATIENT'S PT. UNTIL April 9TH, HE WILL BE MAKING ONLY ONE VISIT. AND AFTER HIS FOLLOW UP ON April 9TH, HE WILL BE MAKING 2 VISITS/WEEK. RONNY CAN BE REACHED [phone].

## 2019-03-19 NOTE — TELEPHONE ENCOUNTER
Called and left the therapist a message letting him know the verbal orders were oked by Dr. Abreu.  Serene

## 2019-03-26 ENCOUNTER — READMISSION MANAGEMENT (OUTPATIENT)
Dept: CALL CENTER | Facility: HOSPITAL | Age: 84
End: 2019-03-26

## 2019-03-26 NOTE — OUTREACH NOTE
General Surgery Week 3 Survey      Responses   Facility patient discharged from?  [place]   Does the patient have one of the following disease processes/diagnoses(primary or secondary)?  General Surgery   Week 3 attempt successful?  Yes   Call start time  0919   Call end time  0921   Discharge diagnosis  ORIF right lateral malleolus fracture and syndesmosis, Acute respiratory failure with hypoxia    Meds reviewed with patient/caregiver?  Yes   Is the patient having any side effects they believe may be caused by any medication additions or changes?  No   Does the patient have all medications related to this admission filled (includes all antibiotics, pain medications, etc.)  Yes   Is the patient taking all medications as directed (includes completed medication regime)?  Yes   Does the patient have a follow up appointment scheduled with their surgeon?  Yes   Has the patient kept scheduled appointments due by today?  Yes   Comments  pcp last thursday   What is the Home health agency?   [facility].   Home health comments  OT and PT will start today   Psychosocial issues?  No   Did the patient receive a copy of their discharge instructions?  Yes   Nursing interventions  Reviewed instructions with patient   What is the patient's perception of their health status since discharge?  Improving   Nursing interventions  Nurse provided patient education   Is the patient /caregiver able to teach back basic post-op care?  Continue use of incentive spirometry at least 1 week post discharge, Practice 'cough and deep breath', Lifting as instructed by MD in discharge instructions, Do not remove steri-strips   Is the patient/caregiver able to teach back signs and symptoms of incisional infection?  Increased redness, swelling or pain at the incisonal site, Increased drainage or bleeding, Incisional warmth, Pus or odor from incision, Fever   Is the patient/caregiver able to teach back steps to recovery at home?  Set small,  achievable goals for return to baseline health, Rest and rebuild strength, gradually increase activity, Make a list of questions for surgeon's appointment, Eat a well-balance diet   Is the patient/caregiver able to teach back the hierarchy of who to call/visit for symptoms/problems? PCP, Specialist, Home health nurse, Urgent Care, ED, 911  Yes   Week 3 call completed?  Yes          Jemma Dewitt, RN

## 2019-03-29 ENCOUNTER — EPISODE CHANGES (OUTPATIENT)
Dept: CALL CENTER | Facility: HOSPITAL | Age: 84
End: 2019-03-29

## 2019-04-02 ENCOUNTER — READMISSION MANAGEMENT (OUTPATIENT)
Dept: CALL CENTER | Facility: HOSPITAL | Age: 84
End: 2019-04-02

## 2019-04-02 NOTE — OUTREACH NOTE
General Surgery Week 4 Survey      Responses   Facility patient discharged from?  [facility]   Does the patient have one of the following disease processes/diagnoses(primary or secondary)?  General Surgery   Week 4 attempt successful?  Yes   Call start time  1418   Call end time  1419   Discharge diagnosis  ORIF right lateral malleolus fracture and syndesmosis, Acute respiratory failure with hypoxia    Is the patient taking all medications as directed (includes completed medication regime)?  Yes   Medication comments  States he is doing well on his medications   Has the patient kept scheduled appointments due by today?  Yes   Comments  saw PCP but sees surgeon 4/9   Is the patient still receiving Home Health Services?  Yes   Home health comments  OT and PT will start today   Psychosocial issues?  No   What is the patient's perception of their health status since discharge?  Improving   Nursing interventions  Nurse provided patient education   Is the patient/caregiver able to teach back the hierarchy of who to call/visit for symptoms/problems? PCP, Specialist, Home health nurse, Urgent Care, ED, 911  Yes   Additional teach back comments  States he is doing well. Still has cast on.    Week 4 call completed?  Yes   Would the patient like one additional call?  No   Graduated  Yes   Did the patient feel the follow up calls were helpful during their recovery period?  No          Irma Zheng, RN

## 2019-04-04 PROBLEM — E78.5 HYPERLIPIDEMIA LDL GOAL <70: Status: ACTIVE | Noted: 2019-04-04

## 2019-04-04 NOTE — PROGRESS NOTES
Encounter Date:04/09/2019    Patient ID: Lico Diego is a 83 y.o. male who resides in [place], [place]    CC/Reason for visit:  Congestive Heart Failure           Problem List Items Addressed This Visit        Cardiovascular and Mediastinum    Chronic systolic congestive heart failure (CMS/HCC)    Overview     · Echo (2/26/2019): LVEF 45%.  No significant valvular abnormality.  RVSP 64 mmHg         Current Assessment & Plan     · NYHA class II heart failure symptoms  · Continue metoprolol succinate 50 mg twice daily  · Discontinue losartan and start Entresto 24/26 mg 1 tablet twice daily  · Low sodium diet  · Continue Lasix 10 mg daily         Relevant Medications    sacubitril-valsartan (ENTRESTO) 24-26 MG tablet    Coronary artery disease involving native coronary artery of native heart with angina pectoris (CMS/[facility])    Overview     · CABG (1998): LIMA to LAD, SVG to circumflex, SVG to RPDA, SVG to diagonal. Normal LVEF  · Cardiac cath (2003): 3/4 grafts patent. Normal LVEF.  Medical management  · PET stress (2013): Inferior infarct with ischemia. Medical management recommended         Current Assessment & Plan     · Continue metoprolol succinate 50 mg twice daily  · Sublingual nitroglycerin for episodes of angina  · Continue aspirin daily         Relevant Medications    sacubitril-valsartan (ENTRESTO) 24-26 MG tablet    Ischemic cardiomyopathy - Primary    Current Assessment & Plan     · NYHA class II-III symptoms  · Discontinue losartan and start Entresto 24/26 mg 1 tablet twice daily  · BMP in 1 week  · Continue Toprol-XL 50 mg twice daily         Relevant Medications    sacubitril-valsartan (ENTRESTO) 24-26 MG tablet    Other Relevant Orders    Basic Metabolic Panel    Essential hypertension    Current Assessment & Plan     · Hypertension is controlled         Relevant Orders    Basic Metabolic Panel    Premature ventricular contractions (PVCs) (VPCs)    Relevant Medications    sacubitril-valsartan  (ENTRESTO) 24-26 MG tablet    Hyperlipidemia LDL goal <70    Current Assessment & Plan     · High intensity statin therapy indicated given presence of CAD and diabetes mellitus.             Patient is doing well from a cardiovascular standpoint and has stable NYHA class II-III symptoms.  I will discontinue his losartan and start low-dose Entresto at 24/26 mg 1 tablet twice daily.  He will need a BMP in 1 week.  We will hold off on any stress testing at this time as patient is asymptomatic.       · Discontinue losartan and start Entresto 24/26 mg 1 tablet twice daily  · BMP in 1 week  · Defer stress testing at this time as patient is asymptomatic  · Follow-up 6 months or sooner if needed        Lico Diego returns today for follow-up of his coronary artery disease, chronic systolic heart failure, ischemic cardiomyopathy and cardiac risk factors.  Although patient has a significant cardiac history he had not seen a cardiologist in quite a few years.  He was hospitalized at [facility] in February for a closed right ankle fracture that was sustained from a fall.  He underwent ORIF with  and then 2 days post procedure the patient went into acute respiratory failure for which a rapid response was called leading to a cardiology consult.  In the setting of his volume overload and acute respiratory distress he had a mildly critical troponin at 0.9 followed by a mildly elevated at one at 0.6 that was felt to be due to demand ischemia. He received IV diuretics with improved his symptoms.  Started on heart failure guideline directed medical therapy including the addition of losartan and beta blocker therapy. He presents today as a follow-up from that hospitalization.  The patient followed up with orthopedics earlier today.  He is still wearing a boot.  His breathing has drastically improved.  According to the patient's daughter he is no longer wheezing after being prescribed inhalers.  His COPD is  more controlled.  She has been ensuring he has a low sodium diet.  The patient is getting ready to move and downsized to a smaller home that is closer to his daughter.  He denies chest pain, orthopnea, palpitations, or syncope.  He unfortunately is still nonweightbearing for another 6 weeks.  He has chronic kidney disease with a baseline creatinine of around 1.5-1.7.  He is tolerated his losartan and most recent creatinine was 1.7.  He has an appointment to establish care with Dr. Santillan.        Review of Systems   Constitution: Negative for weakness and malaise/fatigue.   Eyes: Negative for vision loss in left eye and vision loss in right eye.   Cardiovascular: Negative for chest pain, dyspnea on exertion, near-syncope, orthopnea, palpitations, paroxysmal nocturnal dyspnea and syncope.   Musculoskeletal: Negative for myalgias.   Neurological: Negative for brief paralysis, excessive daytime sleepiness, focal weakness, numbness and paresthesias.   All other systems reviewed and are negative.      The patient's past medical, social, family history and ROS reviewed in the patient's electronic medical record.    Allergies  Patient has no known allergies.    Outpatient Medications Marked as Taking for the 4/9/19 encounter (Office Visit) with Ysabel Gutierrez APRN   Medication Sig Dispense Refill   • albuterol (PROVENTIL) (2.5 MG/3ML) 0.083% nebulizer solution Take 2.5 mg by nebulization Every 8 (Eight) Hours As Needed for Wheezing.     • albuterol sulfate  (90 Base) MCG/ACT inhaler Inhale 1-2 puffs Every 4 (Four) Hours As Needed for Wheezing.     • allopurinol (ZYLOPRIM) 100 MG tablet Take 100 mg by mouth Daily.     • aspirin 325 MG EC tablet Take 325 mg by mouth Daily.     • Budesonide-Formoterol Fumarate (SYMBICORT IN) Inhale.     • citalopram (CeleXA) 10 MG tablet Take 10 mg by mouth Daily.     • fluticasone (FLONASE) 50 MCG/ACT nasal spray 2 sprays by Each Nare route Daily.     • Fluticasone  "Furoate-Vilanterol (BREO ELLIPTA IN) Inhale.     • furosemide (LASIX) 20 MG tablet Take 1 tablet by mouth Daily. (Patient taking differently: Take 10 mg by mouth Daily.)     • glipiZIDE (GLUCOTROL) 5 MG tablet Take 5 mg by mouth 2 (Two) Times a Day Before Meals.     • Insulin Glargine (TOUJEO MAX SOLOSTAR) 300 UNIT/ML solution pen-injector Inject 17 Units under the skin into the appropriate area as directed Every Night.     • iron polysaccharides (NIFEREX) 150 MG capsule Take 150 mg by mouth Daily.     • Loratadine (CLARITIN) 10 MG capsule Take  by mouth Daily.     • metoprolol succinate XL (TOPROL-XL) 100 MG 24 hr tablet Take 1 tablet by mouth Daily. (Patient taking differently: Take 50 mg by mouth 2 (Two) Times a Day.) 90 tablet 0   • Multiple Vitamin (MULTI-VITAMIN DAILY PO) Take  by mouth Daily.     • nitroglycerin (NITROSTAT) 0.4 MG SL tablet Place 1 tablet under the tongue Every 5 (Five) Minutes As Needed for Chest Pain. Take no more than 3 doses in 15 minutes. 25 tablet 0   • simvastatin (ZOCOR) 20 MG tablet Take 20 mg by mouth Every Night.     • vitamin B-12 (CYANOCOBALAMIN) 1000 MCG tablet Take 1,000 mcg by mouth Daily.     • [DISCONTINUED] losartan (COZAAR) 25 MG tablet Take 1 tablet by mouth Daily. (Patient taking differently: Take 25 mg by mouth 2 (Two) Times a Day.) 90 tablet 0           Blood pressure 122/54, pulse 74, height 180.3 cm (71\"), weight 111 kg (245 lb), SpO2 97 %.  Body mass index is 34.17 kg/m².  There were no vitals filed for this visit.    Physical Exam   Constitutional: He is oriented to person, place, and time. He appears well-developed and well-nourished.   HENT:   Head: Normocephalic and atraumatic.   Eyes: Pupils are equal, round, and reactive to light. No scleral icterus.   Neck: No JVD present. Carotid bruit is not present. No thyromegaly present.   Cardiovascular: Normal rate and regular rhythm. Exam reveals no gallop.   No murmur heard.  Pulmonary/Chest: Effort normal and breath " sounds normal.   Abdominal: Soft. He exhibits no distension. There is no hepatosplenomegaly.   Musculoskeletal: He exhibits no edema.   Neurological: He is alert and oriented to person, place, and time.   Skin: Skin is warm and dry.   Psychiatric: He has a normal mood and affect. His behavior is normal.       Data Review:     Procedures    Lab Results   Component Value Date    AST 24 02/27/2019    ALT 14 02/27/2019       Lab Results   Component Value Date    HGBA1C 7.20 (H) 02/23/2019           KATERINA Espinoza  4/9/2019

## 2019-04-09 ENCOUNTER — OFFICE VISIT (OUTPATIENT)
Dept: ORTHOPEDIC SURGERY | Facility: CLINIC | Age: 84
End: 2019-04-09

## 2019-04-09 ENCOUNTER — OFFICE VISIT (OUTPATIENT)
Dept: CARDIOLOGY | Facility: CLINIC | Age: 84
End: 2019-04-09

## 2019-04-09 VITALS
OXYGEN SATURATION: 97 % | WEIGHT: 245 LBS | BODY MASS INDEX: 34.3 KG/M2 | HEIGHT: 71 IN | SYSTOLIC BLOOD PRESSURE: 122 MMHG | DIASTOLIC BLOOD PRESSURE: 54 MMHG | HEART RATE: 74 BPM

## 2019-04-09 DIAGNOSIS — I25.119 CORONARY ARTERY DISEASE INVOLVING NATIVE CORONARY ARTERY OF NATIVE HEART WITH ANGINA PECTORIS (HCC): ICD-10-CM

## 2019-04-09 DIAGNOSIS — E78.5 HYPERLIPIDEMIA LDL GOAL <70: ICD-10-CM

## 2019-04-09 DIAGNOSIS — I50.22 CHRONIC SYSTOLIC CONGESTIVE HEART FAILURE (HCC): ICD-10-CM

## 2019-04-09 DIAGNOSIS — I10 ESSENTIAL HYPERTENSION: ICD-10-CM

## 2019-04-09 DIAGNOSIS — I25.5 ISCHEMIC CARDIOMYOPATHY: Primary | ICD-10-CM

## 2019-04-09 DIAGNOSIS — S82.841D CLOSED BIMALLEOLAR FRACTURE OF RIGHT ANKLE WITH ROUTINE HEALING, SUBSEQUENT ENCOUNTER: ICD-10-CM

## 2019-04-09 DIAGNOSIS — Z09 SURGERY FOLLOW-UP: Primary | ICD-10-CM

## 2019-04-09 DIAGNOSIS — I49.3 PREMATURE VENTRICULAR CONTRACTIONS (PVCS) (VPCS): ICD-10-CM

## 2019-04-09 PROCEDURE — 99024 POSTOP FOLLOW-UP VISIT: CPT | Performed by: PHYSICIAN ASSISTANT

## 2019-04-09 PROCEDURE — 99214 OFFICE O/P EST MOD 30 MIN: CPT | Performed by: NURSE PRACTITIONER

## 2019-04-09 RX ORDER — LORATADINE 10 MG/1
CAPSULE, LIQUID FILLED ORAL DAILY
COMMUNITY

## 2019-04-09 RX ORDER — IRON POLYSACCHARIDE COMPLEX 150 MG
150 CAPSULE ORAL DAILY
COMMUNITY
End: 2020-06-23

## 2019-04-09 RX ORDER — GLIPIZIDE 5 MG/1
5 TABLET ORAL
Status: ON HOLD | COMMUNITY
End: 2019-11-08

## 2019-04-09 NOTE — PROGRESS NOTES
[facility] [place] [facility] [place]    Subjective     Post-op (4 weeks - Status Post OPEN REDUCTION INTERNAL FIXATION RIGHT LATERAL MALLEOLUS FRACTURE AND SYNDESMOSIS 2/23/19)       KASIA Diego is a 83 y.o. male.  Patient returns today now 6 weeks status post right ankle open reduction internal fixation for a bimalleolar fracture with placement of syndesmotic screw performed on the above date by Dr. Abreu.  He has no complaints or issues.  He is been in a cast since his last visit.  He has a history of diabetic neuropathy therefore he denies pain as well as numbness or tingling into the extremity.  No reported fever, chills, night sweats or other constitutional symptoms.        Objective      Physical Exam  There were no vitals taken for this visit.    There is no height or weight on file to calculate BMI.        Ortho Exam  Right ankle  Cast removed  Skin: Surgical incision well-healed without redness, warmth, drainage or evidence of infection.  Still with some mild lower extremity edema much improved from previous visit.      Tenderness: No palpable tenderness on exam today.  Motor: Grossly intact anterior tib, gastroc, EHL, FHL  Sensory: Patient with a history of diabetic neuropathy denies sensation to light touch throughout foot.  Vascular: Unable to palpate dorsalis or posterior tib pulse.  According to daughter typically have to utilize Doppler in order to palpate.  Toes are pink and warm.      Imaging:  Ordered plain film imaging of the right ankle.  Images were reviewed by Dr. Abreu.  Patient is status post open reduction internal fixation for a right lateral malleolus fracture with syndesmotic screw placement.  No evidence of hardware complication or failure.    Positive healing noted to the lateral malleolus.  Mortise remains well aligned.  Syndesmosis shows no evidence of widening.  See chart for official report.      Assessment:  1. Surgery follow-up    2. Closed bimalleolar  fracture of right ankle with routine healing, subsequent encounter        Plan:  1. Patient status post open reduction internal fixation for right ankle bimalleolar fracture.  2. Patient was placed into a nonpneumatic boot today.  3. He understands that he needs to remain nonweightbearing to the right lower extremity.  4. Continue with elevation for swelling control.  5. Additionally since the cast is now off he can begin wearing compression sock to the right lower extremity.  6. Follow-up 4 weeks for repeat evaluation to include pre-clinic imaging of the right ankle out of the boot.  Depending on exam and x-ray at that time we will determine if he can begin weightbearing.  7. Question and concerns answered.      Jocelyne Bruno PA-C  04/12/19  9:24 AM

## 2019-04-09 NOTE — ASSESSMENT & PLAN NOTE
· Continue metoprolol succinate 50 mg twice daily  · Sublingual nitroglycerin for episodes of angina  · Continue aspirin daily

## 2019-04-09 NOTE — ASSESSMENT & PLAN NOTE
· NYHA class II-III symptoms  · Discontinue losartan and start Entresto 24/26 mg 1 tablet twice daily  · BMP in 1 week  · Continue Toprol-XL 50 mg twice daily

## 2019-04-09 NOTE — ASSESSMENT & PLAN NOTE
· NYHA class II heart failure symptoms  · Continue metoprolol succinate 50 mg twice daily  · Discontinue losartan and start Entresto 24/26 mg 1 tablet twice daily  · Low sodium diet  · Continue Lasix 10 mg daily

## 2019-04-12 ENCOUNTER — TELEPHONE (OUTPATIENT)
Dept: ORTHOPEDIC SURGERY | Facility: CLINIC | Age: 84
End: 2019-04-12

## 2019-04-12 NOTE — TELEPHONE ENCOUNTER
Attempted to call Ruperto back, left a voicemail to advise that the patient's ROM is unlimited he just is nonweightbearing.

## 2019-04-12 NOTE — TELEPHONE ENCOUNTER
Ruperto is a physcial therapist that has been working with this patient. He just wanted to follow up to see if the patient was still nonweightbearing and if the range of motion has changed. I did read the office notes from the last visit and did tell him that the patient was to continue to be nonweightbearing but was unsure about the range of motion. If someone can call him back just to make sure that he is told so he can move forward with this patient. Thanks. Ruperto - [phone].

## 2019-04-18 ENCOUNTER — TELEPHONE (OUTPATIENT)
Dept: CARDIOLOGY | Facility: CLINIC | Age: 84
End: 2019-04-18

## 2019-04-18 RX ORDER — LOSARTAN POTASSIUM 25 MG/1
25 TABLET ORAL DAILY
Qty: 180 TABLET | Refills: 1
Start: 2019-04-18 | End: 2019-05-20 | Stop reason: ALTCHOICE

## 2019-05-07 ENCOUNTER — OFFICE VISIT (OUTPATIENT)
Dept: ORTHOPEDIC SURGERY | Facility: CLINIC | Age: 84
End: 2019-05-07

## 2019-05-07 DIAGNOSIS — M17.0 PRIMARY OSTEOARTHRITIS OF BOTH KNEES: ICD-10-CM

## 2019-05-07 DIAGNOSIS — M25.561 CHRONIC PAIN OF BOTH KNEES: ICD-10-CM

## 2019-05-07 DIAGNOSIS — G89.29 CHRONIC PAIN OF BOTH KNEES: ICD-10-CM

## 2019-05-07 DIAGNOSIS — Z98.890 STATUS POST ORIF OF FRACTURE OF ANKLE: Primary | ICD-10-CM

## 2019-05-07 DIAGNOSIS — Z87.81 STATUS POST ORIF OF FRACTURE OF ANKLE: Primary | ICD-10-CM

## 2019-05-07 DIAGNOSIS — M25.562 CHRONIC PAIN OF BOTH KNEES: ICD-10-CM

## 2019-05-07 DIAGNOSIS — M21.372 FOOT DROP, LEFT: ICD-10-CM

## 2019-05-07 PROCEDURE — 99214 OFFICE O/P EST MOD 30 MIN: CPT | Performed by: PHYSICIAN ASSISTANT

## 2019-05-07 PROCEDURE — 20610 DRAIN/INJ JOINT/BURSA W/O US: CPT | Performed by: PHYSICIAN ASSISTANT

## 2019-05-07 PROCEDURE — 99024 POSTOP FOLLOW-UP VISIT: CPT | Performed by: PHYSICIAN ASSISTANT

## 2019-05-07 RX ADMIN — TRIAMCINOLONE ACETONIDE 40 MG: 40 INJECTION, SUSPENSION INTRA-ARTICULAR; INTRAMUSCULAR at 09:04

## 2019-05-07 RX ADMIN — LIDOCAINE HYDROCHLORIDE 4 ML: 10 INJECTION, SOLUTION EPIDURAL; INFILTRATION; INTRACAUDAL; PERINEURAL at 09:04

## 2019-05-07 NOTE — PROGRESS NOTES
[facility] [place] [facility] Note    Subjective     Post-op (10 weeks - Status Post OPEN REDUCTION INTERNAL FIXATION RIGHT LATERAL MALLEOLUS FRACTURE AND SYNDESMOSIS 2/23/19)       KASIA Diego is a 83 y.o. male.  Patient returns today for follow-up evaluation status post open reduction internal fixation for right bimalleolar fracture with placement of the syndesmotic screw performed on the above date by Dr. Abreu.  With regards to his right ankle he is having no complaints or issues.  Patient does have a history of diabetic neuropathy therefore he denies any pain, numbness or tingling into the lower leg/ankle/foot.  Patient has been nonweightbearing except for transfers and wearing boot to the right lower extremity.    He does present today with 2 additional complaints one is he is noticed that he is unable to dorsiflex his left ankle or extend his toes.  He does not recall any specific history of injury or trauma.  Patient is unsure when this developed but he is just now bringing it up.  He denies any pain numbness or tingling into the foot or ankle.  States the only way he can dorsiflex his ankle is to wraps around it and pull it back.    His second complaint is bilateral, right greater than left knee pain.  Patient is requesting possible Visco supplementation injections.  States steroid injections in the past only provided extremely temporary relief.        Objective      Physical Exam  There were no vitals taken for this visit.    There is no height or weight on file to calculate BMI.      Ortho Exam  Right ankle  Skin: Surgical incision well-healed without redness, warmth, drainage or evidence of infection except for a small scab noted proximal aspect of the incision.        Tenderness: No palpable tenderness.  Motor: Grossly intact anterior tib, gastroc, EHL, FHL  Sensory: Patient with a history of diabetic neuropathy denies sensation to light touch throughout foot.  Vascular: Toes are pink  and warm.     Left lower extremity/ankle  Skin grossly intact without any redness or warmth.  Positive bilateral lower extremity edema noted.  No palpable tenderness.  No evidence of firing along anterior tib, EHL.  Patient is unable to dorsiflex or extend the toes.  Unable to hold in dorsiflexion or extended position if placed.  Able to plantarflex ankle and flex toes.  Patient with history of diabetic neuropathy no sensation to light touch throughout ankle and foot.    Bilateral knees  Skin: Grossly intact without any redness, warmth or swelling.  Tenderness: Positive medial greater than lateral joint lines.  Right more symptomatic than left.  Motion: 0-120 degrees.  Instability: Varus and valgus stress testing stable and intact.      Imaging:  Multiple imaging performed today all images were read by Dr. Abreu.      Plain film imaging right ankle: Patient is status post open reduction internal fixation for a right lateral malleolus fracture with syndesmotic screw placement.  No evidence of hardware complication or failure.    Healed fracture.  Mortise remains well aligned.  Syndesmosis shows no evidence of widening.  See chart for official report.    Plain film imaging left ankle: No acute bony ab normality, fracture or dislocation.  Mortise well aligned with no evidence of syndesmotic widening.  See chart for official report.    Plain film imaging bilateral knees: Positive medial and patellofemoral degenerative changes (moderate).  Varus alignment.  See chart for official report.    Assessment:  1. Status post ORIF of fracture of ankle    2. Chronic pain of both knees    3. Primary osteoarthritis of both knees    4. Foot drop, left        Plan:  1. Patient status post open reduction internal fixation for right ankle bimalleolar fracture, stable and healed.  2. Patient to continue wearing pneumatic boot.    3. May begin progressive weightbearing in the boot only and with use of a walker.  4. Chronic bilateral  knee pain with osteoarthritis right more symptomatic than left.  5. Offered and accepted a corticosteroid injection to the right knee for temporary pain relief.  Injection given today.  6. Ordered Visco supplementation injection for bilateral knees.  Awaiting preauthorization.  Once obtained will contact patient to schedule injection appointments.  7. For further evaluation of dropfoot to the left lower extremity ordered EMG/NCS to assess whether it is coming peripherally at the level of the common peroneal nerve being entrapped or if it something secondary to irritation along the sciatic nerve.  8. For his dropfoot he was ordered an AFO to give him stability and support.  9. Follow-up 4 weeks by then EMG/NCS to be completed and we should have approval regarding his Visco supplementation injections so we can begin those shots then.  10. Question and concerns answered.      After discussing the risks, benefits, indications of injection, the patient gave consent to proceed.  His right knee was confirmed as the correct joint to be injected with a timeout.  It was then prepped using Hibiclens and injected with a mixture of 4 cc of 1% plain lidocaine and 1 cc of Kenalog (40 mg per mL) without any resistance through the anterior lateral approach, patient in seated position.  Area was cleaned, hemostasis was achieved and a Band-Aid was applied over the injection site.  The patient tolerated procedure well.  I instructed the patient on signs and symptoms of infection.  They should report to the emergency department or return to clinic if any of these develop, for further evaluation and treatment.  Recommended modifying activity for the next 48 hours to include rest, ice, elevation and oral pain medication as needed.  Postinjection and blood sugar monitoring discussed.      Jocelyne Bruno PA-C  05/09/19  12:16 PM

## 2019-05-07 NOTE — PROGRESS NOTES
Procedure   Large Joint Arthrocentesis: R knee  Date/Time: 5/7/2019 9:04 AM  Consent given by: patient  Site marked: site marked  Timeout: Immediately prior to procedure a time out was called to verify the correct patient, procedure, equipment, support staff and site/side marked as required   Supporting Documentation  Indications: pain   Procedure Details  Location: knee - R knee  Preparation: Patient was prepped and draped in the usual sterile fashion  Needle size: 22 G  Approach: anterolateral  Medications administered: 4 mL lidocaine PF 1% 1 %; 40 mg triamcinolone acetonide 40 MG/ML  Patient tolerance: patient tolerated the procedure well with no immediate complications

## 2019-05-09 RX ORDER — LIDOCAINE HYDROCHLORIDE 10 MG/ML
4 INJECTION, SOLUTION EPIDURAL; INFILTRATION; INTRACAUDAL; PERINEURAL
Status: COMPLETED | OUTPATIENT
Start: 2019-05-07 | End: 2019-05-07

## 2019-05-09 RX ORDER — TRIAMCINOLONE ACETONIDE 40 MG/ML
40 INJECTION, SUSPENSION INTRA-ARTICULAR; INTRAMUSCULAR
Status: COMPLETED | OUTPATIENT
Start: 2019-05-07 | End: 2019-05-07

## 2019-05-16 ENCOUNTER — TRANSCRIBE ORDERS (OUTPATIENT)
Dept: ADMINISTRATIVE | Facility: HOSPITAL | Age: 84
End: 2019-05-16

## 2019-05-16 ENCOUNTER — LAB (OUTPATIENT)
Dept: LAB | Facility: HOSPITAL | Age: 84
End: 2019-05-16

## 2019-05-16 ENCOUNTER — TRANSCRIBE ORDERS (OUTPATIENT)
Dept: LAB | Facility: HOSPITAL | Age: 84
End: 2019-05-16

## 2019-05-16 DIAGNOSIS — J44.9 CHRONIC OBSTRUCTIVE PULMONARY DISEASE, UNSPECIFIED COPD TYPE (HCC): ICD-10-CM

## 2019-05-16 DIAGNOSIS — Z79.4 TYPE 2 DIABETES MELLITUS WITH DIABETIC POLYNEUROPATHY, WITH LONG-TERM CURRENT USE OF INSULIN (HCC): ICD-10-CM

## 2019-05-16 DIAGNOSIS — I50.22 CHRONIC SYSTOLIC CONGESTIVE HEART FAILURE (HCC): ICD-10-CM

## 2019-05-16 DIAGNOSIS — S82.841A BIMALLEOLAR ANKLE FRACTURE, RIGHT, CLOSED, INITIAL ENCOUNTER: ICD-10-CM

## 2019-05-16 DIAGNOSIS — R11.14 BILIOUS VOMITING WITH NAUSEA: ICD-10-CM

## 2019-05-16 DIAGNOSIS — E11.42 TYPE 2 DIABETES MELLITUS WITH DIABETIC POLYNEUROPATHY, WITH LONG-TERM CURRENT USE OF INSULIN (HCC): ICD-10-CM

## 2019-05-16 DIAGNOSIS — I49.3 PREMATURE VENTRICULAR CONTRACTIONS (PVCS) (VPCS): ICD-10-CM

## 2019-05-16 DIAGNOSIS — E66.01 SEVERE OBESITY (BMI 35.0-35.9 WITH COMORBIDITY) (HCC): ICD-10-CM

## 2019-05-16 DIAGNOSIS — E78.5 HYPERLIPIDEMIA LDL GOAL <70: ICD-10-CM

## 2019-05-16 DIAGNOSIS — I10 ESSENTIAL HYPERTENSION: ICD-10-CM

## 2019-05-16 DIAGNOSIS — S93.431A SYNDESMOTIC DISRUPTION OF RIGHT ANKLE, INITIAL ENCOUNTER: ICD-10-CM

## 2019-05-16 DIAGNOSIS — N39.0 URINARY TRACT INFECTION WITHOUT HEMATURIA, SITE UNSPECIFIED: ICD-10-CM

## 2019-05-16 DIAGNOSIS — I25.5 ISCHEMIC CARDIOMYOPATHY: ICD-10-CM

## 2019-05-16 DIAGNOSIS — I25.119 CORONARY ARTERY DISEASE INVOLVING NATIVE CORONARY ARTERY OF NATIVE HEART WITH ANGINA PECTORIS (HCC): ICD-10-CM

## 2019-05-16 DIAGNOSIS — I50.22 CHRONIC SYSTOLIC CONGESTIVE HEART FAILURE (HCC): Primary | ICD-10-CM

## 2019-05-16 DIAGNOSIS — R19.7 DIARRHEA OF PRESUMED INFECTIOUS ORIGIN: ICD-10-CM

## 2019-05-16 DIAGNOSIS — G47.33 OSA (OBSTRUCTIVE SLEEP APNEA): ICD-10-CM

## 2019-05-16 DIAGNOSIS — N39.0 URINARY TRACT INFECTION WITHOUT HEMATURIA, SITE UNSPECIFIED: Primary | ICD-10-CM

## 2019-05-16 LAB
ALBUMIN SERPL-MCNC: 4.1 G/DL (ref 3.5–5.2)
ALBUMIN/GLOB SERPL: 1.4 G/DL
ALP SERPL-CCNC: 55 U/L (ref 39–117)
ALT SERPL W P-5'-P-CCNC: 11 U/L (ref 1–41)
ANION GAP SERPL CALCULATED.3IONS-SCNC: 13 MMOL/L
AST SERPL-CCNC: 14 U/L (ref 1–40)
BASOPHILS # BLD AUTO: 0.03 10*3/MM3 (ref 0–0.2)
BASOPHILS NFR BLD AUTO: 0.5 % (ref 0–1.5)
BILIRUB SERPL-MCNC: 0.6 MG/DL (ref 0.2–1.2)
BUN BLD-MCNC: 36 MG/DL (ref 8–23)
BUN/CREAT SERPL: 21.2 (ref 7–25)
CALCIUM SPEC-SCNC: 8.8 MG/DL (ref 8.6–10.5)
CHLORIDE SERPL-SCNC: 99 MMOL/L (ref 98–107)
CO2 SERPL-SCNC: 23 MMOL/L (ref 22–29)
CREAT BLD-MCNC: 1.7 MG/DL (ref 0.76–1.27)
DEPRECATED RDW RBC AUTO: 55.8 FL (ref 37–54)
EOSINOPHIL # BLD AUTO: 0.08 10*3/MM3 (ref 0–0.4)
EOSINOPHIL NFR BLD AUTO: 1.2 % (ref 0.3–6.2)
ERYTHROCYTE [DISTWIDTH] IN BLOOD BY AUTOMATED COUNT: 15.4 % (ref 12.3–15.4)
GFR SERPL CREATININE-BSD FRML MDRD: 39 ML/MIN/1.73
GLOBULIN UR ELPH-MCNC: 2.9 GM/DL
GLUCOSE BLD-MCNC: 64 MG/DL (ref 65–99)
HCT VFR BLD AUTO: 40.3 % (ref 37.5–51)
HGB BLD-MCNC: 12.5 G/DL (ref 13–17.7)
IMM GRANULOCYTES # BLD AUTO: 0.04 10*3/MM3 (ref 0–0.05)
IMM GRANULOCYTES NFR BLD AUTO: 0.6 % (ref 0–0.5)
LYMPHOCYTES # BLD AUTO: 1.44 10*3/MM3 (ref 0.7–3.1)
LYMPHOCYTES NFR BLD AUTO: 22.4 % (ref 19.6–45.3)
MCH RBC QN AUTO: 30.7 PG (ref 26.6–33)
MCHC RBC AUTO-ENTMCNC: 31 G/DL (ref 31.5–35.7)
MCV RBC AUTO: 99 FL (ref 79–97)
MONOCYTES # BLD AUTO: 0.77 10*3/MM3 (ref 0.1–0.9)
MONOCYTES NFR BLD AUTO: 12 % (ref 5–12)
NEUTROPHILS # BLD AUTO: 4.08 10*3/MM3 (ref 1.7–7)
NEUTROPHILS NFR BLD AUTO: 63.3 % (ref 42.7–76)
NRBC BLD AUTO-RTO: 0 /100 WBC (ref 0–0.2)
PLATELET # BLD AUTO: 212 10*3/MM3 (ref 140–450)
PMV BLD AUTO: 9.8 FL (ref 6–12)
POTASSIUM BLD-SCNC: 4.8 MMOL/L (ref 3.5–5.2)
PROT SERPL-MCNC: 7 G/DL (ref 6–8.5)
RBC # BLD AUTO: 4.07 10*6/MM3 (ref 4.14–5.8)
SODIUM BLD-SCNC: 135 MMOL/L (ref 136–145)
WBC NRBC COR # BLD: 6.44 10*3/MM3 (ref 3.4–10.8)

## 2019-05-16 PROCEDURE — 80053 COMPREHEN METABOLIC PANEL: CPT

## 2019-05-16 PROCEDURE — 85025 COMPLETE CBC W/AUTO DIFF WBC: CPT

## 2019-05-16 PROCEDURE — 36415 COLL VENOUS BLD VENIPUNCTURE: CPT

## 2019-05-20 ENCOUNTER — HOSPITAL ENCOUNTER (OUTPATIENT)
Dept: INFUSION THERAPY | Facility: HOSPITAL | Age: 84
Discharge: HOME | End: 2019-05-20
Admitting: INTERNAL MEDICINE

## 2019-05-20 ENCOUNTER — APPOINTMENT (OUTPATIENT)
Dept: LAB | Facility: HOSPITAL | Age: 84
End: 2019-05-20

## 2019-05-20 ENCOUNTER — TRANSCRIBE ORDERS (OUTPATIENT)
Dept: LAB | Facility: HOSPITAL | Age: 84
End: 2019-05-20

## 2019-05-20 VITALS
DIASTOLIC BLOOD PRESSURE: 49 MMHG | HEIGHT: 71 IN | TEMPERATURE: 97.8 F | WEIGHT: 243 LBS | OXYGEN SATURATION: 97 % | HEART RATE: 66 BPM | SYSTOLIC BLOOD PRESSURE: 127 MMHG | RESPIRATION RATE: 18 BRPM | BODY MASS INDEX: 34.02 KG/M2

## 2019-05-20 DIAGNOSIS — N18.30 CHRONIC KIDNEY DISEASE, STAGE III (MODERATE) (HCC): ICD-10-CM

## 2019-05-20 DIAGNOSIS — A49.1 GLYCOPEPTIDE RESISTANT ENTEROCOCCUS INFECTION, MULTI-DRUG RESISTANCE: ICD-10-CM

## 2019-05-20 DIAGNOSIS — E11.22 TYPE 2 DIABETES MELLITUS WITH ESRD (END-STAGE RENAL DISEASE) (HCC): ICD-10-CM

## 2019-05-20 DIAGNOSIS — Z16.24 GLYCOPEPTIDE RESISTANT ENTEROCOCCUS INFECTION, MULTI-DRUG RESISTANCE: ICD-10-CM

## 2019-05-20 DIAGNOSIS — N39.0 URINARY TRACT INFECTION WITHOUT HEMATURIA, SITE UNSPECIFIED: ICD-10-CM

## 2019-05-20 DIAGNOSIS — N10 ACUTE PYELONEPHRITIS WITHOUT LESION OF RENAL MEDULLARY NECROSIS: Primary | ICD-10-CM

## 2019-05-20 DIAGNOSIS — A49.8 BACTERIAL INFECTION DUE TO PROTEUS MIRABILIS: ICD-10-CM

## 2019-05-20 DIAGNOSIS — N18.6 TYPE 2 DIABETES MELLITUS WITH ESRD (END-STAGE RENAL DISEASE) (HCC): ICD-10-CM

## 2019-05-20 DIAGNOSIS — I12.9 PARENCHYMAL RENAL HYPERTENSION, STAGE 1 THROUGH STAGE 4 OR UNSPECIFIED CHRONIC KIDNEY DISEASE: ICD-10-CM

## 2019-05-20 LAB
ALBUMIN SERPL-MCNC: 3.7 G/DL (ref 3.5–5.2)
ALBUMIN/GLOB SERPL: 1.1 G/DL
ALP SERPL-CCNC: 56 U/L (ref 39–117)
ALT SERPL W P-5'-P-CCNC: 10 U/L (ref 1–41)
ANION GAP SERPL CALCULATED.3IONS-SCNC: 11 MMOL/L
AST SERPL-CCNC: 13 U/L (ref 1–40)
BASOPHILS # BLD AUTO: 0.01 10*3/MM3 (ref 0–0.2)
BASOPHILS NFR BLD AUTO: 0.2 % (ref 0–1.5)
BILIRUB SERPL-MCNC: 0.5 MG/DL (ref 0.2–1.2)
BUN BLD-MCNC: 37 MG/DL (ref 8–23)
BUN/CREAT SERPL: 22 (ref 7–25)
CALCIUM SPEC-SCNC: 9.1 MG/DL (ref 8.6–10.5)
CHLORIDE SERPL-SCNC: 103 MMOL/L (ref 98–107)
CO2 SERPL-SCNC: 26 MMOL/L (ref 22–29)
CREAT BLD-MCNC: 1.68 MG/DL (ref 0.76–1.27)
CRP SERPL-MCNC: 0.94 MG/DL (ref 0–0.5)
DEPRECATED RDW RBC AUTO: 54.2 FL (ref 37–54)
EOSINOPHIL # BLD AUTO: 0.13 10*3/MM3 (ref 0–0.4)
EOSINOPHIL NFR BLD AUTO: 2.1 % (ref 0.3–6.2)
ERYTHROCYTE [DISTWIDTH] IN BLOOD BY AUTOMATED COUNT: 15.3 % (ref 12.3–15.4)
ERYTHROCYTE [SEDIMENTATION RATE] IN BLOOD: 22 MM/HR (ref 0–20)
GFR SERPL CREATININE-BSD FRML MDRD: 39 ML/MIN/1.73
GLOBULIN UR ELPH-MCNC: 3.5 GM/DL
GLUCOSE BLD-MCNC: 81 MG/DL (ref 65–99)
HCT VFR BLD AUTO: 37.9 % (ref 37.5–51)
HGB BLD-MCNC: 12.2 G/DL (ref 13–17.7)
IMM GRANULOCYTES # BLD AUTO: 0.03 10*3/MM3 (ref 0–0.05)
IMM GRANULOCYTES NFR BLD AUTO: 0.5 % (ref 0–0.5)
LYMPHOCYTES # BLD AUTO: 1.58 10*3/MM3 (ref 0.7–3.1)
LYMPHOCYTES NFR BLD AUTO: 25.6 % (ref 19.6–45.3)
MCH RBC QN AUTO: 30.8 PG (ref 26.6–33)
MCHC RBC AUTO-ENTMCNC: 32.2 G/DL (ref 31.5–35.7)
MCV RBC AUTO: 95.7 FL (ref 79–97)
MONOCYTES # BLD AUTO: 0.5 10*3/MM3 (ref 0.1–0.9)
MONOCYTES NFR BLD AUTO: 8.1 % (ref 5–12)
NEUTROPHILS # BLD AUTO: 3.96 10*3/MM3 (ref 1.7–7)
NEUTROPHILS NFR BLD AUTO: 64 % (ref 42.7–76)
PLATELET # BLD AUTO: 197 10*3/MM3 (ref 140–450)
PMV BLD AUTO: 9.1 FL (ref 6–12)
POTASSIUM BLD-SCNC: 4.9 MMOL/L (ref 3.5–5.2)
PROT SERPL-MCNC: 7.2 G/DL (ref 6–8.5)
RBC # BLD AUTO: 3.96 10*6/MM3 (ref 4.14–5.8)
SODIUM BLD-SCNC: 140 MMOL/L (ref 136–145)
WBC NRBC COR # BLD: 6.18 10*3/MM3 (ref 3.4–10.8)

## 2019-05-20 PROCEDURE — C1894 INTRO/SHEATH, NON-LASER: HCPCS

## 2019-05-20 PROCEDURE — C1751 CATH, INF, PER/CENT/MIDLINE: HCPCS

## 2019-05-20 PROCEDURE — 86140 C-REACTIVE PROTEIN: CPT | Performed by: INTERNAL MEDICINE

## 2019-05-20 PROCEDURE — 85025 COMPLETE CBC W/AUTO DIFF WBC: CPT | Performed by: INTERNAL MEDICINE

## 2019-05-20 PROCEDURE — 36415 COLL VENOUS BLD VENIPUNCTURE: CPT | Performed by: INTERNAL MEDICINE

## 2019-05-20 PROCEDURE — 85652 RBC SED RATE AUTOMATED: CPT | Performed by: INTERNAL MEDICINE

## 2019-05-20 PROCEDURE — 80053 COMPREHEN METABOLIC PANEL: CPT | Performed by: INTERNAL MEDICINE

## 2019-05-20 RX ORDER — VALSARTAN 80 MG/1
80 TABLET ORAL DAILY
Status: ON HOLD | COMMUNITY
End: 2019-11-06

## 2019-05-20 RX ORDER — SODIUM CHLORIDE 0.9 % (FLUSH) 0.9 %
10 SYRINGE (ML) INJECTION AS NEEDED
Status: DISCONTINUED | OUTPATIENT
Start: 2019-05-20 | End: 2019-05-22 | Stop reason: HOSPADM

## 2019-05-20 NOTE — PROGRESS NOTES
Pt here for picc line for antibiotic therapy. Medications, allergies, and medical history reviewed and updated. Daughter with pt today.   picc line placed in upper Rt arm. Dressing clean, dry, and intact with stockinet cover. Given printed discharge instructions.  Discharged per wheelchair going to Dr Shah office.

## 2019-05-20 NOTE — PROGRESS NOTES
Placed by Hernandez RN - confirmed with 3cg technology.  RUE single lumen PICC with 49cm total and 0cm exposed.

## 2019-05-28 ENCOUNTER — LAB (OUTPATIENT)
Dept: LAB | Facility: HOSPITAL | Age: 84
End: 2019-05-28

## 2019-05-28 ENCOUNTER — TRANSCRIBE ORDERS (OUTPATIENT)
Dept: LAB | Facility: HOSPITAL | Age: 84
End: 2019-05-28

## 2019-05-28 DIAGNOSIS — I12.9 PARENCHYMAL RENAL HYPERTENSION, STAGE 1 THROUGH STAGE 4 OR UNSPECIFIED CHRONIC KIDNEY DISEASE: ICD-10-CM

## 2019-05-28 DIAGNOSIS — N18.30 CHRONIC KIDNEY DISEASE, STAGE III (MODERATE) (HCC): ICD-10-CM

## 2019-05-28 DIAGNOSIS — A49.1 GLYCOPEPTIDE RESISTANT ENTEROCOCCUS INFECTION, MULTI-DRUG RESISTANCE: ICD-10-CM

## 2019-05-28 DIAGNOSIS — Z16.24 GLYCOPEPTIDE RESISTANT ENTEROCOCCUS INFECTION, MULTI-DRUG RESISTANCE: ICD-10-CM

## 2019-05-28 DIAGNOSIS — I10 ESSENTIAL HYPERTENSION: ICD-10-CM

## 2019-05-28 DIAGNOSIS — A49.8 BACTERIAL INFECTION DUE TO PROTEUS MIRABILIS: ICD-10-CM

## 2019-05-28 DIAGNOSIS — N10 ACUTE PYELONEPHRITIS WITHOUT LESION OF RENAL MEDULLARY NECROSIS: Primary | ICD-10-CM

## 2019-05-28 DIAGNOSIS — N10 ACUTE PYELONEPHRITIS WITHOUT LESION OF RENAL MEDULLARY NECROSIS: ICD-10-CM

## 2019-05-28 DIAGNOSIS — I25.5 ISCHEMIC CARDIOMYOPATHY: ICD-10-CM

## 2019-05-28 LAB
ALBUMIN SERPL-MCNC: 3.6 G/DL (ref 3.5–5.2)
ALBUMIN/GLOB SERPL: 1.1 G/DL
ALP SERPL-CCNC: 54 U/L (ref 39–117)
ALT SERPL W P-5'-P-CCNC: 10 U/L (ref 1–41)
ANION GAP SERPL CALCULATED.3IONS-SCNC: 11 MMOL/L
ANION GAP SERPL CALCULATED.3IONS-SCNC: 14.7 MMOL/L
AST SERPL-CCNC: 16 U/L (ref 1–40)
BASOPHILS # BLD AUTO: 0.02 10*3/MM3 (ref 0–0.2)
BASOPHILS NFR BLD AUTO: 0.3 % (ref 0–1.5)
BILIRUB SERPL-MCNC: 0.7 MG/DL (ref 0.2–1.2)
BUN BLD-MCNC: 32 MG/DL (ref 8–23)
BUN BLD-MCNC: 33 MG/DL (ref 8–23)
BUN/CREAT SERPL: 20.6 (ref 7–25)
BUN/CREAT SERPL: 21.9 (ref 7–25)
CALCIUM SPEC-SCNC: 9.2 MG/DL (ref 8.6–10.5)
CALCIUM SPEC-SCNC: 9.2 MG/DL (ref 8.6–10.5)
CHLORIDE SERPL-SCNC: 101 MMOL/L (ref 98–107)
CHLORIDE SERPL-SCNC: 103 MMOL/L (ref 98–107)
CO2 SERPL-SCNC: 21.3 MMOL/L (ref 22–29)
CO2 SERPL-SCNC: 23 MMOL/L (ref 22–29)
CREAT BLD-MCNC: 1.46 MG/DL (ref 0.76–1.27)
CREAT BLD-MCNC: 1.6 MG/DL (ref 0.76–1.27)
CRP SERPL-MCNC: 2.14 MG/DL (ref 0–0.5)
DEPRECATED RDW RBC AUTO: 51.8 FL (ref 37–54)
EOSINOPHIL # BLD AUTO: 0.16 10*3/MM3 (ref 0–0.4)
EOSINOPHIL NFR BLD AUTO: 2.3 % (ref 0.3–6.2)
ERYTHROCYTE [DISTWIDTH] IN BLOOD BY AUTOMATED COUNT: 14.8 % (ref 12.3–15.4)
ERYTHROCYTE [SEDIMENTATION RATE] IN BLOOD: 24 MM/HR (ref 0–20)
GFR SERPL CREATININE-BSD FRML MDRD: 42 ML/MIN/1.73
GFR SERPL CREATININE-BSD FRML MDRD: 46 ML/MIN/1.73
GLOBULIN UR ELPH-MCNC: 3.2 GM/DL
GLUCOSE BLD-MCNC: 33 MG/DL (ref 65–99)
GLUCOSE BLD-MCNC: 43 MG/DL (ref 65–99)
HCT VFR BLD AUTO: 36.7 % (ref 37.5–51)
HGB BLD-MCNC: 11.8 G/DL (ref 13–17.7)
IMM GRANULOCYTES # BLD AUTO: 0.01 10*3/MM3 (ref 0–0.05)
IMM GRANULOCYTES NFR BLD AUTO: 0.1 % (ref 0–0.5)
LYMPHOCYTES # BLD AUTO: 1.97 10*3/MM3 (ref 0.7–3.1)
LYMPHOCYTES NFR BLD AUTO: 28.8 % (ref 19.6–45.3)
MCH RBC QN AUTO: 30.7 PG (ref 26.6–33)
MCHC RBC AUTO-ENTMCNC: 32.2 G/DL (ref 31.5–35.7)
MCV RBC AUTO: 95.6 FL (ref 79–97)
MONOCYTES # BLD AUTO: 0.72 10*3/MM3 (ref 0.1–0.9)
MONOCYTES NFR BLD AUTO: 10.5 % (ref 5–12)
NEUTROPHILS # BLD AUTO: 3.98 10*3/MM3 (ref 1.7–7)
NEUTROPHILS NFR BLD AUTO: 58.1 % (ref 42.7–76)
PLATELET # BLD AUTO: 237 10*3/MM3 (ref 140–450)
PMV BLD AUTO: 9.3 FL (ref 6–12)
POTASSIUM BLD-SCNC: 5.6 MMOL/L (ref 3.5–5.2)
POTASSIUM BLD-SCNC: 6.2 MMOL/L (ref 3.5–5.2)
PROT SERPL-MCNC: 6.8 G/DL (ref 6–8.5)
RBC # BLD AUTO: 3.84 10*6/MM3 (ref 4.14–5.8)
SODIUM BLD-SCNC: 137 MMOL/L (ref 136–145)
SODIUM BLD-SCNC: 137 MMOL/L (ref 136–145)
WBC NRBC COR # BLD: 6.85 10*3/MM3 (ref 3.4–10.8)

## 2019-05-28 PROCEDURE — 85652 RBC SED RATE AUTOMATED: CPT

## 2019-05-28 PROCEDURE — 86140 C-REACTIVE PROTEIN: CPT

## 2019-05-28 PROCEDURE — 80053 COMPREHEN METABOLIC PANEL: CPT

## 2019-05-28 PROCEDURE — 85025 COMPLETE CBC W/AUTO DIFF WBC: CPT

## 2019-05-28 PROCEDURE — 85007 BL SMEAR W/DIFF WBC COUNT: CPT

## 2019-05-28 PROCEDURE — 36415 COLL VENOUS BLD VENIPUNCTURE: CPT

## 2019-05-29 ENCOUNTER — TELEPHONE (OUTPATIENT)
Dept: CARDIOLOGY | Facility: CLINIC | Age: 84
End: 2019-05-29

## 2019-05-29 DIAGNOSIS — E87.5 HYPERKALEMIA: Primary | ICD-10-CM

## 2019-05-29 NOTE — TELEPHONE ENCOUNTER
[facility] lab called to report the patient has critical labs, which were drawn over the weekend and just resulted.   Glucose 33  K 6.2    Did you want a re-draw?  Recently changed from Entresto back to losartan.  Furosemide 20 mg daily PRN

## 2019-05-29 NOTE — TELEPHONE ENCOUNTER
I called and spoke with the patient. He reports he is diabetic and he monitors his glucose. He was already aware of his abnormal labs. He will go today for a repeat labs.     Per Sara Gutierrez, KATERINA-  Repeat stat BMP.  If potassium still elevated need to discontinue angiotensin receptor blocker.

## 2019-05-30 ENCOUNTER — LAB (OUTPATIENT)
Dept: LAB | Facility: HOSPITAL | Age: 84
End: 2019-05-30

## 2019-05-30 ENCOUNTER — TRANSCRIBE ORDERS (OUTPATIENT)
Dept: LAB | Facility: HOSPITAL | Age: 84
End: 2019-05-30

## 2019-05-30 DIAGNOSIS — A49.1 GLYCOPEPTIDE RESISTANT ENTEROCOCCUS INFECTION, MULTI-DRUG RESISTANCE: ICD-10-CM

## 2019-05-30 DIAGNOSIS — Z16.24 GLYCOPEPTIDE RESISTANT ENTEROCOCCUS INFECTION, MULTI-DRUG RESISTANCE: ICD-10-CM

## 2019-05-30 DIAGNOSIS — A49.8 BACTERIAL INFECTION DUE TO PROTEUS MIRABILIS: ICD-10-CM

## 2019-05-30 DIAGNOSIS — I12.9 PARENCHYMAL RENAL HYPERTENSION, STAGE 1 THROUGH STAGE 4 OR UNSPECIFIED CHRONIC KIDNEY DISEASE: ICD-10-CM

## 2019-05-30 DIAGNOSIS — N10 ACUTE PYELONEPHRITIS WITHOUT LESION OF RENAL MEDULLARY NECROSIS: Primary | ICD-10-CM

## 2019-05-30 DIAGNOSIS — N18.30 CHRONIC KIDNEY DISEASE, STAGE III (MODERATE) (HCC): ICD-10-CM

## 2019-05-30 DIAGNOSIS — N39.0 URINARY TRACT INFECTION WITHOUT HEMATURIA, SITE UNSPECIFIED: ICD-10-CM

## 2019-05-30 DIAGNOSIS — E11.22 TYPE 2 DIABETES MELLITUS WITH ESRD (END-STAGE RENAL DISEASE) (HCC): ICD-10-CM

## 2019-05-30 DIAGNOSIS — N10 ACUTE PYELONEPHRITIS WITHOUT LESION OF RENAL MEDULLARY NECROSIS: ICD-10-CM

## 2019-05-30 DIAGNOSIS — N18.6 TYPE 2 DIABETES MELLITUS WITH ESRD (END-STAGE RENAL DISEASE) (HCC): ICD-10-CM

## 2019-05-30 LAB
ALBUMIN SERPL-MCNC: 3.6 G/DL (ref 3.5–5.2)
ALBUMIN/GLOB SERPL: 1.5 G/DL
ALP SERPL-CCNC: 59 U/L (ref 39–117)
ALT SERPL W P-5'-P-CCNC: 10 U/L (ref 1–41)
ANION GAP SERPL CALCULATED.3IONS-SCNC: 12 MMOL/L
AST SERPL-CCNC: 12 U/L (ref 1–40)
BASOPHILS # BLD AUTO: 0.02 10*3/MM3 (ref 0–0.2)
BASOPHILS NFR BLD AUTO: 0.4 % (ref 0–1.5)
BILIRUB SERPL-MCNC: 0.6 MG/DL (ref 0.2–1.2)
BUN BLD-MCNC: 41 MG/DL (ref 8–23)
BUN/CREAT SERPL: 26.8 (ref 7–25)
CALCIUM SPEC-SCNC: 8.7 MG/DL (ref 8.6–10.5)
CHLORIDE SERPL-SCNC: 103 MMOL/L (ref 98–107)
CO2 SERPL-SCNC: 25 MMOL/L (ref 22–29)
CREAT BLD-MCNC: 1.53 MG/DL (ref 0.76–1.27)
CRP SERPL-MCNC: 2.35 MG/DL (ref 0–0.5)
DEPRECATED RDW RBC AUTO: 50.8 FL (ref 37–54)
EOSINOPHIL # BLD AUTO: 0.14 10*3/MM3 (ref 0–0.4)
EOSINOPHIL NFR BLD AUTO: 3.1 % (ref 0.3–6.2)
ERYTHROCYTE [DISTWIDTH] IN BLOOD BY AUTOMATED COUNT: 14.6 % (ref 12.3–15.4)
ERYTHROCYTE [SEDIMENTATION RATE] IN BLOOD: 26 MM/HR (ref 0–20)
GFR SERPL CREATININE-BSD FRML MDRD: 44 ML/MIN/1.73
GLOBULIN UR ELPH-MCNC: 2.4 GM/DL
GLUCOSE BLD-MCNC: 134 MG/DL (ref 65–99)
HCT VFR BLD AUTO: 33.3 % (ref 37.5–51)
HGB BLD-MCNC: 10.9 G/DL (ref 13–17.7)
IMM GRANULOCYTES # BLD AUTO: 0.01 10*3/MM3 (ref 0–0.05)
IMM GRANULOCYTES NFR BLD AUTO: 0.2 % (ref 0–0.5)
LYMPHOCYTES # BLD AUTO: 1.29 10*3/MM3 (ref 0.7–3.1)
LYMPHOCYTES NFR BLD AUTO: 28.5 % (ref 19.6–45.3)
MCH RBC QN AUTO: 30.8 PG (ref 26.6–33)
MCHC RBC AUTO-ENTMCNC: 32.7 G/DL (ref 31.5–35.7)
MCV RBC AUTO: 94.1 FL (ref 79–97)
MONOCYTES # BLD AUTO: 0.33 10*3/MM3 (ref 0.1–0.9)
MONOCYTES NFR BLD AUTO: 7.3 % (ref 5–12)
NEUTROPHILS # BLD AUTO: 2.75 10*3/MM3 (ref 1.7–7)
NEUTROPHILS NFR BLD AUTO: 60.7 % (ref 42.7–76)
PLATELET # BLD AUTO: 219 10*3/MM3 (ref 140–450)
PMV BLD AUTO: 8.9 FL (ref 6–12)
POTASSIUM BLD-SCNC: 4.8 MMOL/L (ref 3.5–5.2)
PROT SERPL-MCNC: 6 G/DL (ref 6–8.5)
RBC # BLD AUTO: 3.54 10*6/MM3 (ref 4.14–5.8)
SODIUM BLD-SCNC: 140 MMOL/L (ref 136–145)
WBC NRBC COR # BLD: 4.53 10*3/MM3 (ref 3.4–10.8)

## 2019-05-30 PROCEDURE — 85652 RBC SED RATE AUTOMATED: CPT

## 2019-05-30 PROCEDURE — 85025 COMPLETE CBC W/AUTO DIFF WBC: CPT

## 2019-05-30 PROCEDURE — 86140 C-REACTIVE PROTEIN: CPT

## 2019-05-30 PROCEDURE — 36415 COLL VENOUS BLD VENIPUNCTURE: CPT

## 2019-05-30 PROCEDURE — 80053 COMPREHEN METABOLIC PANEL: CPT

## 2019-06-04 ENCOUNTER — OFFICE VISIT (OUTPATIENT)
Dept: ORTHOPEDIC SURGERY | Facility: CLINIC | Age: 84
End: 2019-06-04

## 2019-06-04 VITALS — HEIGHT: 71 IN | WEIGHT: 248 LBS | BODY MASS INDEX: 34.72 KG/M2 | HEART RATE: 79 BPM | OXYGEN SATURATION: 96 %

## 2019-06-04 DIAGNOSIS — Z87.81 STATUS POST ORIF OF FRACTURE OF ANKLE: Primary | ICD-10-CM

## 2019-06-04 DIAGNOSIS — S82.841D CLOSED BIMALLEOLAR FRACTURE OF RIGHT ANKLE WITH ROUTINE HEALING, SUBSEQUENT ENCOUNTER: ICD-10-CM

## 2019-06-04 DIAGNOSIS — G89.29 CHRONIC PAIN OF BOTH KNEES: ICD-10-CM

## 2019-06-04 DIAGNOSIS — M25.561 CHRONIC PAIN OF BOTH KNEES: ICD-10-CM

## 2019-06-04 DIAGNOSIS — M25.562 CHRONIC PAIN OF BOTH KNEES: ICD-10-CM

## 2019-06-04 DIAGNOSIS — M17.0 PRIMARY OSTEOARTHRITIS OF BOTH KNEES: ICD-10-CM

## 2019-06-04 DIAGNOSIS — M21.372 FOOT DROP, LEFT: ICD-10-CM

## 2019-06-04 DIAGNOSIS — Z98.890 STATUS POST ORIF OF FRACTURE OF ANKLE: Primary | ICD-10-CM

## 2019-06-04 PROCEDURE — 99213 OFFICE O/P EST LOW 20 MIN: CPT | Performed by: PHYSICIAN ASSISTANT

## 2019-06-04 NOTE — PROGRESS NOTES
"    [facility] [place] [facility] [place]    Subjective     CC: Follow-up of the Left Knee (1 month f/u/); Follow-up of the Right Knee (1 month f/u/); and Follow-up (1 month f/u dropfoot to the left lower extremity )      KASIA Diego is a 83 y.o. male.  Patient returns today for follow-up evaluation he is status post ORIF to the right ankle for a bimalleolar fracture.  He has no pain with regards to his ankle.  He has been wearing a boot and weightbearing as tolerated.    With regards to his left ankle and the previous diagnosis of foot drop.  He is now on an AFO splint which he states has helped his symptoms.  He declined proceeding on with EMG/NCS.  Once again no pain to the left ankle.  Patient denies any numbness or tingling.    Chronic pain both knees with evidence of osteoarthritis.  He received a corticosteroid injection in his right knee at the last appointment.  At this time both knees are doing well with only minimal discomfort.      ROS:    Constiutional:Pt denies fever, chills, nausea, or vomiting.  MSK:as above    Objective      Past Medical History  Past Medical History:   Diagnosis Date   • Basal cell carcinoma    • CKD (chronic kidney disease)    • COPD (chronic obstructive pulmonary disease) (CMS/HCC)    • Coronary artery disease    • Diabetes mellitus (CMS/HCC)    • Ejection fraction < 50%     EF 35%   • H/O seasonal allergies    • Hypertension    • Myocardial infarction (CMS/HCC)    • JC (obstructive sleep apnea)     cpap   • Prostate cancer (CMS/HCC)     RADIATION TREATMENTS         Physical Exam  Pulse 79   Ht 180.3 cm (70.98\")   Wt 112 kg (248 lb)   SpO2 96%   BMI 34.60 kg/m²     Body mass index is 34.6 kg/m².    Patient is well nourished and well developed.        Ortho Exam  Right ankle  Well-healed incision.  No tenderness.  Motor: Grossly intact anterior tib, gastroc, EHL, FHL  Sensory: Patient with a history of diabetic neuropathy denies sensation to light touch throughout " foot.    Bilateral knees  Skin: Grossly intact without any redness, warmth or swelling.  Tenderness: Minimal joint line tenderness, both knees are stable.  Motion: 0-120 degrees.    Left ankle  No new exam.  Still unable to actively dorsiflex or hold ankle in dorsiflexion.  Currently wearing AFO splint.    Imaging/Labs/EMG Reviewed:    Imaging Results (last 24 hours)     ** No results found for the last 24 hours. **      None today.      Assessment:  1. Status post ORIF of fracture of ankle    2. Closed bimalleolar fracture of right ankle with routine healing, subsequent encounter    3. Foot drop, left    4. Chronic pain of both knees    5. Primary osteoarthritis of both knees        Plan:  1. Patient status post open reduction internal fixation for right ankle bimalleolar fracture, stable and healed.  2. Patient may discontinue use of the pneumatic boot.  He may transition to regular shoes.    3. Continue with weightbearing as tolerated in regular shoes and  using the walker.   4. Chronic bilateral knee pain with osteoarthritis right more symptomatic than left, stable.  We will hold off on Visco supplementation injections for now as his pain is under good control.  Patient will contact the clinic when he is ready to begin injections.  5. Continue wearing AFO for his left foot drop.  Patient is uninterested in proceeding on with EMG/NCS at this time.  6. Recommend over-the-counter pain medications as needed.  The patient is not requiring any type of medication for pain control.  7. Follow-up 4 weeks for final check of his right ankle.  At that time we will revisit whether he can be driving or not.  8. Question and concerns answered.      Medical Decision Making  Management Options : over-the-counter medicine      Jocelyne Bruno PA-C  06/04/19  3:44 PM

## 2019-06-20 ENCOUNTER — APPOINTMENT (OUTPATIENT)
Dept: NEUROLOGY | Facility: HOSPITAL | Age: 84
End: 2019-06-20

## 2019-07-02 ENCOUNTER — OFFICE VISIT (OUTPATIENT)
Dept: ORTHOPEDIC SURGERY | Facility: CLINIC | Age: 84
End: 2019-07-02

## 2019-07-02 VITALS — HEIGHT: 71 IN | BODY MASS INDEX: 34.57 KG/M2 | OXYGEN SATURATION: 96 % | HEART RATE: 70 BPM | WEIGHT: 246.91 LBS

## 2019-07-02 DIAGNOSIS — M17.0 PRIMARY OSTEOARTHRITIS OF BOTH KNEES: ICD-10-CM

## 2019-07-02 DIAGNOSIS — S82.841D CLOSED BIMALLEOLAR FRACTURE OF RIGHT ANKLE WITH ROUTINE HEALING, SUBSEQUENT ENCOUNTER: ICD-10-CM

## 2019-07-02 DIAGNOSIS — Z87.81 STATUS POST ORIF OF FRACTURE OF ANKLE: Primary | ICD-10-CM

## 2019-07-02 DIAGNOSIS — Z98.890 STATUS POST ORIF OF FRACTURE OF ANKLE: Primary | ICD-10-CM

## 2019-07-02 DIAGNOSIS — M21.372 FOOT DROP, LEFT: ICD-10-CM

## 2019-07-02 PROCEDURE — 99213 OFFICE O/P EST LOW 20 MIN: CPT | Performed by: PHYSICIAN ASSISTANT

## 2019-07-02 NOTE — PROGRESS NOTES
"    [facility] [place] [facility] Note    Subjective     CC: Follow-up of the Right Ankle (4 week follow up/Status post ORIF of fracture of ankle)      KASIA Diego is a 83 y.o. male.  Patient returns today for follow-up evaluation status post ORIF to the right ankle for bimalleolar fracture.  He continues to have no pain to the ankle.  He is weightbearing as tolerated with use of a walker.  He has no issues or complaints with regards to his ankle.    He continues to have the noted foot drop to the left lower extremity.  He is currently wearing an AFO which helps his symptoms.  Once again patient is not having any type of numbness or tingling into the extremity.  He does not wish to proceed on with a EMG/NCS.    Patient also has chronic knee pain with known osteoarthritis to both knees.  He still doing well the corticosteroid injection he was given to the right knee back in May.  I have ordered Visco supplementation for him if his pain begins to flare up.  We can proceed with these injections to both knees.    Otherwise patient is doing well with no new issues or complaints.    ROS:    Constiutional:Pt denies fever, chills, nausea, or vomiting.  MSK:as above    Objective      Past Medical History  Past Medical History:   Diagnosis Date   • Basal cell carcinoma    • CKD (chronic kidney disease)    • COPD (chronic obstructive pulmonary disease) (CMS/HCC)    • Coronary artery disease    • Diabetes mellitus (CMS/HCC)    • Ejection fraction < 50%     EF 35%   • H/O seasonal allergies    • Hypertension    • Myocardial infarction (CMS/HCC)    • JC (obstructive sleep apnea)     cpap   • Prostate cancer (CMS/HCC)     RADIATION TREATMENTS         Physical Exam  Pulse 70   Ht 180.3 cm (70.98\")   Wt 112 kg (246 lb 14.6 oz)   SpO2 96%   BMI 34.45 kg/m²     Body mass index is 34.45 kg/m².    Patient is well nourished and well developed.        Ortho Exam  Right ankle  Skin: Well-healed incision.  Tenderness: No " tenderness.  Motor: Grossly intact anterior tib, gastroc, EHL, FHL  Sensory: Patient with a history of diabetic neuropathy denies sensation to light touch throughout foot.     Bilateral knees  Skin: Grossly intact without any redness, warmth or swelling.  Tenderness: Minimal joint line tenderness, both knees are stable.  Motion: 0-120 degrees.      Imaging/Labs/EMG Reviewed:    Imaging Results (last 24 hours)     ** No results found for the last 24 hours. **      None today.      Assessment:  1. Status post ORIF of fracture of ankle    2. Closed bimalleolar fracture of right ankle with routine healing, subsequent encounter    3. Foot drop, left    4. Primary osteoarthritis of both knees        Plan:  1. Status post ORIF for right ankle bimalleolar fracture, stable and healed.  Patient is doing well.  2. Recommend he continue using his walker for weightbearing as tolerated to give him better stability and balance especially in light of his history of lower extremity neuropathy, AFO to the left side dropfoot and history of right ankle fracture.   3. With regards to his bilateral knee pain with osteoarthritis, if pain symptoms begin to increase then we can proceed on with the Visco supplementation injections that have been preauthorized through his insurance.  4. Continue wearing AFO for his left foot drop.    5. Recommend over-the-counter pain medications as needed.    6. Follow-up as needed.  7. Question and concerns answered.      Medical Decision Making  Management Options : over-the-counter medicine      Jocelyne Bruno PA-C  07/02/19  1:47 PM

## 2019-11-06 ENCOUNTER — HOSPITAL ENCOUNTER (INPATIENT)
Facility: HOSPITAL | Age: 84
LOS: 3 days | Discharge: HOME | End: 2019-11-09
Attending: INTERNAL MEDICINE | Admitting: FAMILY MEDICINE

## 2019-11-06 ENCOUNTER — APPOINTMENT (OUTPATIENT)
Dept: GENERAL RADIOLOGY | Facility: HOSPITAL | Age: 84
End: 2019-11-06

## 2019-11-06 PROBLEM — R09.02 HYPOXIA: Status: ACTIVE | Noted: 2019-11-06

## 2019-11-06 PROBLEM — R09.02 HYPOXIA: Status: RESOLVED | Noted: 2019-11-06 | Resolved: 2019-11-06

## 2019-11-06 PROBLEM — R33.9 URINARY RETENTION: Chronic | Status: ACTIVE | Noted: 2019-11-06

## 2019-11-06 LAB
ALBUMIN SERPL-MCNC: 3.6 G/DL (ref 3.5–5.2)
ALBUMIN/GLOB SERPL: 1.3 G/DL
ALP SERPL-CCNC: 47 U/L (ref 39–117)
ALT SERPL W P-5'-P-CCNC: 10 U/L (ref 1–41)
ANION GAP SERPL CALCULATED.3IONS-SCNC: 12 MMOL/L (ref 5–15)
AST SERPL-CCNC: 19 U/L (ref 1–40)
BILIRUB SERPL-MCNC: 0.5 MG/DL (ref 0.2–1.2)
BUN BLD-MCNC: 28 MG/DL (ref 8–23)
BUN/CREAT SERPL: 16.7 (ref 7–25)
CALCIUM SPEC-SCNC: 8.8 MG/DL (ref 8.6–10.5)
CHLORIDE SERPL-SCNC: 102 MMOL/L (ref 98–107)
CO2 SERPL-SCNC: 24 MMOL/L (ref 22–29)
CREAT BLD-MCNC: 1.68 MG/DL (ref 0.76–1.27)
DEPRECATED RDW RBC AUTO: 54.4 FL (ref 37–54)
ERYTHROCYTE [DISTWIDTH] IN BLOOD BY AUTOMATED COUNT: 14.4 % (ref 12.3–15.4)
GFR SERPL CREATININE-BSD FRML MDRD: 39 ML/MIN/1.73
GLOBULIN UR ELPH-MCNC: 2.7 GM/DL
GLUCOSE BLD-MCNC: 218 MG/DL (ref 65–99)
GLUCOSE BLDC GLUCOMTR-MCNC: 134 MG/DL (ref 70–130)
GLUCOSE BLDC GLUCOMTR-MCNC: 161 MG/DL (ref 70–130)
HBA1C MFR BLD: 5.9 % (ref 4.8–5.6)
HCT VFR BLD AUTO: 37.3 % (ref 37.5–51)
HGB BLD-MCNC: 11.2 G/DL (ref 13–17.7)
INR PPP: 1.15 (ref 0.85–1.16)
MAGNESIUM SERPL-MCNC: 2 MG/DL (ref 1.6–2.4)
MCH RBC QN AUTO: 31.2 PG (ref 26.6–33)
MCHC RBC AUTO-ENTMCNC: 30 G/DL (ref 31.5–35.7)
MCV RBC AUTO: 103.9 FL (ref 79–97)
NT-PROBNP SERPL-MCNC: 1764 PG/ML (ref 5–1800)
PLATELET # BLD AUTO: 167 10*3/MM3 (ref 140–450)
PMV BLD AUTO: 9.2 FL (ref 6–12)
POTASSIUM BLD-SCNC: 4.8 MMOL/L (ref 3.5–5.2)
PROT SERPL-MCNC: 6.3 G/DL (ref 6–8.5)
PROTHROMBIN TIME: 14.1 SECONDS (ref 11.2–14.3)
RBC # BLD AUTO: 3.59 10*6/MM3 (ref 4.14–5.8)
SODIUM BLD-SCNC: 138 MMOL/L (ref 136–145)
TROPONIN T SERPL-MCNC: 0.04 NG/ML (ref 0–0.03)
WBC NRBC COR # BLD: 5.35 10*3/MM3 (ref 3.4–10.8)

## 2019-11-06 PROCEDURE — 63710000001 INSULIN DETEMIR PER 5 UNITS: Performed by: INTERNAL MEDICINE

## 2019-11-06 PROCEDURE — 99223 1ST HOSP IP/OBS HIGH 75: CPT | Performed by: INTERNAL MEDICINE

## 2019-11-06 PROCEDURE — 25010000002 HEPARIN (PORCINE) PER 1000 UNITS: Performed by: INTERNAL MEDICINE

## 2019-11-06 PROCEDURE — 84484 ASSAY OF TROPONIN QUANT: CPT | Performed by: INTERNAL MEDICINE

## 2019-11-06 PROCEDURE — 83880 ASSAY OF NATRIURETIC PEPTIDE: CPT | Performed by: INTERNAL MEDICINE

## 2019-11-06 PROCEDURE — 85610 PROTHROMBIN TIME: CPT | Performed by: INTERNAL MEDICINE

## 2019-11-06 PROCEDURE — 85027 COMPLETE CBC AUTOMATED: CPT | Performed by: INTERNAL MEDICINE

## 2019-11-06 PROCEDURE — 93005 ELECTROCARDIOGRAM TRACING: CPT | Performed by: INTERNAL MEDICINE

## 2019-11-06 PROCEDURE — 63710000001 INSULIN LISPRO (HUMAN) PER 5 UNITS: Performed by: INTERNAL MEDICINE

## 2019-11-06 PROCEDURE — 80053 COMPREHEN METABOLIC PANEL: CPT | Performed by: INTERNAL MEDICINE

## 2019-11-06 PROCEDURE — 71045 X-RAY EXAM CHEST 1 VIEW: CPT

## 2019-11-06 PROCEDURE — 82962 GLUCOSE BLOOD TEST: CPT

## 2019-11-06 PROCEDURE — 83036 HEMOGLOBIN GLYCOSYLATED A1C: CPT | Performed by: INTERNAL MEDICINE

## 2019-11-06 PROCEDURE — 93010 ELECTROCARDIOGRAM REPORT: CPT | Performed by: INTERNAL MEDICINE

## 2019-11-06 PROCEDURE — 83735 ASSAY OF MAGNESIUM: CPT | Performed by: INTERNAL MEDICINE

## 2019-11-06 RX ORDER — BUDESONIDE AND FORMOTEROL FUMARATE DIHYDRATE 80; 4.5 UG/1; UG/1
2 AEROSOL RESPIRATORY (INHALATION)
Status: DISCONTINUED | OUTPATIENT
Start: 2019-11-07 | End: 2019-11-09 | Stop reason: HOSPADM

## 2019-11-06 RX ORDER — SODIUM CHLORIDE 0.9 % (FLUSH) 0.9 %
10 SYRINGE (ML) INJECTION AS NEEDED
Status: DISCONTINUED | OUTPATIENT
Start: 2019-11-06 | End: 2019-11-09 | Stop reason: HOSPADM

## 2019-11-06 RX ORDER — BUMETANIDE 0.25 MG/ML
1 INJECTION INTRAMUSCULAR; INTRAVENOUS ONCE
Status: COMPLETED | OUTPATIENT
Start: 2019-11-06 | End: 2019-11-06

## 2019-11-06 RX ORDER — FERROUS SULFATE 325(65) MG
325 TABLET ORAL
Status: DISCONTINUED | OUTPATIENT
Start: 2019-11-07 | End: 2019-11-09 | Stop reason: HOSPADM

## 2019-11-06 RX ORDER — CITALOPRAM 20 MG/1
10 TABLET ORAL DAILY
Status: DISCONTINUED | OUTPATIENT
Start: 2019-11-07 | End: 2019-11-09 | Stop reason: HOSPADM

## 2019-11-06 RX ORDER — IPRATROPIUM BROMIDE AND ALBUTEROL SULFATE 2.5; .5 MG/3ML; MG/3ML
3 SOLUTION RESPIRATORY (INHALATION) EVERY 4 HOURS PRN
Status: DISCONTINUED | OUTPATIENT
Start: 2019-11-06 | End: 2019-11-09 | Stop reason: HOSPADM

## 2019-11-06 RX ORDER — BUMETANIDE 0.25 MG/ML
1 INJECTION INTRAMUSCULAR; INTRAVENOUS EVERY 12 HOURS
Status: COMPLETED | OUTPATIENT
Start: 2019-11-07 | End: 2019-11-08

## 2019-11-06 RX ORDER — ALLOPURINOL 100 MG/1
100 TABLET ORAL DAILY
Status: DISCONTINUED | OUTPATIENT
Start: 2019-11-07 | End: 2019-11-07

## 2019-11-06 RX ORDER — HEPARIN SODIUM 5000 [USP'U]/ML
5000 INJECTION, SOLUTION INTRAVENOUS; SUBCUTANEOUS EVERY 12 HOURS SCHEDULED
Status: DISCONTINUED | OUTPATIENT
Start: 2019-11-06 | End: 2019-11-09 | Stop reason: HOSPADM

## 2019-11-06 RX ORDER — ASPIRIN 325 MG
162 TABLET ORAL DAILY
Status: DISCONTINUED | OUTPATIENT
Start: 2019-11-07 | End: 2019-11-09 | Stop reason: HOSPADM

## 2019-11-06 RX ORDER — FAMOTIDINE 20 MG/1
20 TABLET, FILM COATED ORAL
Status: DISCONTINUED | OUTPATIENT
Start: 2019-11-06 | End: 2019-11-09 | Stop reason: HOSPADM

## 2019-11-06 RX ORDER — LANOLIN ALCOHOL/MO/W.PET/CERES
1000 CREAM (GRAM) TOPICAL DAILY
Status: DISCONTINUED | OUTPATIENT
Start: 2019-11-07 | End: 2019-11-09 | Stop reason: HOSPADM

## 2019-11-06 RX ORDER — ALBUTEROL SULFATE 2.5 MG/3ML
2.5 SOLUTION RESPIRATORY (INHALATION) EVERY 8 HOURS PRN
Status: DISCONTINUED | OUTPATIENT
Start: 2019-11-06 | End: 2019-11-06

## 2019-11-06 RX ORDER — SODIUM CHLORIDE 0.9 % (FLUSH) 0.9 %
10 SYRINGE (ML) INJECTION EVERY 12 HOURS SCHEDULED
Status: DISCONTINUED | OUTPATIENT
Start: 2019-11-06 | End: 2019-11-09 | Stop reason: HOSPADM

## 2019-11-06 RX ORDER — ACETAMINOPHEN 500 MG
1500 TABLET ORAL 2 TIMES DAILY
Status: ON HOLD | COMMUNITY
End: 2019-11-06

## 2019-11-06 RX ORDER — ATORVASTATIN CALCIUM 10 MG/1
10 TABLET, FILM COATED ORAL NIGHTLY
Status: DISCONTINUED | OUTPATIENT
Start: 2019-11-06 | End: 2019-11-09 | Stop reason: HOSPADM

## 2019-11-06 RX ORDER — NICOTINE POLACRILEX 4 MG
15 LOZENGE BUCCAL
Status: DISCONTINUED | OUTPATIENT
Start: 2019-11-06 | End: 2019-11-09 | Stop reason: HOSPADM

## 2019-11-06 RX ORDER — BUMETANIDE 0.25 MG/ML
2 INJECTION INTRAMUSCULAR; INTRAVENOUS ONCE
Status: DISCONTINUED | OUTPATIENT
Start: 2019-11-06 | End: 2019-11-06

## 2019-11-06 RX ORDER — DOXAZOSIN 2 MG/1
2 TABLET ORAL NIGHTLY
COMMUNITY
End: 2019-12-03 | Stop reason: SDUPTHER

## 2019-11-06 RX ORDER — DEXTROSE MONOHYDRATE 25 G/50ML
25 INJECTION, SOLUTION INTRAVENOUS
Status: DISCONTINUED | OUTPATIENT
Start: 2019-11-06 | End: 2019-11-09 | Stop reason: HOSPADM

## 2019-11-06 RX ORDER — FLUTICASONE PROPIONATE 50 MCG
2 SPRAY, SUSPENSION (ML) NASAL DAILY
Status: DISCONTINUED | OUTPATIENT
Start: 2019-11-07 | End: 2019-11-09 | Stop reason: HOSPADM

## 2019-11-06 RX ORDER — NITROGLYCERIN 0.4 MG/1
0.4 TABLET SUBLINGUAL
Status: DISCONTINUED | OUTPATIENT
Start: 2019-11-06 | End: 2019-11-09 | Stop reason: HOSPADM

## 2019-11-06 RX ORDER — TERAZOSIN 2 MG/1
2 CAPSULE ORAL NIGHTLY
Status: DISCONTINUED | OUTPATIENT
Start: 2019-11-06 | End: 2019-11-09 | Stop reason: HOSPADM

## 2019-11-06 RX ORDER — CETIRIZINE HYDROCHLORIDE 10 MG/1
5 TABLET ORAL DAILY
Status: DISCONTINUED | OUTPATIENT
Start: 2019-11-07 | End: 2019-11-09 | Stop reason: HOSPADM

## 2019-11-06 RX ORDER — METOPROLOL SUCCINATE 25 MG/1
25 TABLET, EXTENDED RELEASE ORAL 2 TIMES DAILY
Status: DISCONTINUED | OUTPATIENT
Start: 2019-11-06 | End: 2019-11-09 | Stop reason: HOSPADM

## 2019-11-06 RX ORDER — AMOXICILLIN 250 MG
2 CAPSULE ORAL 2 TIMES DAILY PRN
Status: DISCONTINUED | OUTPATIENT
Start: 2019-11-06 | End: 2019-11-09 | Stop reason: HOSPADM

## 2019-11-06 RX ADMIN — SODIUM CHLORIDE, PRESERVATIVE FREE 10 ML: 5 INJECTION INTRAVENOUS at 21:20

## 2019-11-06 RX ADMIN — INSULIN LISPRO 2 UNITS: 100 INJECTION, SOLUTION INTRAVENOUS; SUBCUTANEOUS at 17:23

## 2019-11-06 RX ADMIN — FAMOTIDINE 20 MG: 20 TABLET ORAL at 17:24

## 2019-11-06 RX ADMIN — HEPARIN SODIUM 5000 UNITS: 5000 INJECTION, SOLUTION INTRAVENOUS; SUBCUTANEOUS at 21:20

## 2019-11-06 RX ADMIN — ATORVASTATIN CALCIUM 10 MG: 10 TABLET, FILM COATED ORAL at 21:19

## 2019-11-06 RX ADMIN — INSULIN DETEMIR 14 UNITS: 100 INJECTION, SOLUTION SUBCUTANEOUS at 21:20

## 2019-11-06 RX ADMIN — BUMETANIDE 1 MG: 0.25 INJECTION INTRAMUSCULAR; INTRAVENOUS at 16:23

## 2019-11-06 RX ADMIN — METOPROLOL SUCCINATE 25 MG: 25 TABLET, EXTENDED RELEASE ORAL at 21:20

## 2019-11-06 RX ADMIN — TERAZOSIN HYDROCHLORIDE ANHYDROUS 2 MG: 2 CAPSULE ORAL at 21:19

## 2019-11-06 NOTE — PLAN OF CARE
Problem: Pain, Chronic (Adult)  Goal: Identify Related Risk Factors and Signs and Symptoms  Outcome: Outcome(s) achieved Date Met: 11/06/19    Goal: Acceptable Pain/Comfort Level and Functional Ability  Outcome: Ongoing (interventions implemented as appropriate)      Problem: Patient Care Overview  Goal: Plan of Care Review  Outcome: Ongoing (interventions implemented as appropriate)      Problem: Fall Risk (Adult)  Goal: Identify Related Risk Factors and Signs and Symptoms  Outcome: Outcome(s) achieved Date Met: 11/06/19    Goal: Absence of Fall  Outcome: Ongoing (interventions implemented as appropriate)

## 2019-11-06 NOTE — H&P
[facility] Medicine Services  HISTORY AND PHYSICAL    Patient Name: Lico Diego  : 1935  MRN: [id number]  Primary Care Physician: Khai Menjivar DO  Date of admission: 2019      Subjective   Subjective     Chief Complaint:  Dyspnea, leg edema    HPI:  Lico Diego is a 84 y.o. male w/ hx cad, ischemic cardiomyopathy, ckd 3 (baseline cr ~1.7-1.8), insulin dep dm, obesity/jc who was admitted here 2019 with ankle fracture and, after operative repair, developed respiratory failure due to CHF, herbert and was seen by cardiology and nephrology during that hospital stay. In meantime has been following with nephrology in outpatient clinic and has been on lasix 40mg 3 x weekly (//). Over the past 10 days or so has had 20 lb weight gain, increased leg edema, dyspnea with exertion. No overt chest pain. No change in chronic sputum. No fever. No chills. At [facility] office today patient's oxygen saturations were reportedly 87% on room air, had significant peripheral edema and was directly admitted for aggressive iv diuresis. Was to see Dr. Hebert Govea in clinic on 12/3/19.     Review of Systems   No headache, no focal weakness, no chest pain. No dysuria. No palpitations    All other systems reviewed and are negative.     Personal History     Past Medical History:   Diagnosis Date   • Basal cell carcinoma    • CKD (chronic kidney disease)    • COPD (chronic obstructive pulmonary disease) (CMS/HCC)    • Coronary artery disease    • Diabetes mellitus (CMS/HCC)    • Ejection fraction < 50%     EF 35%   • H/O seasonal allergies    • Hypertension    • Myocardial infarction (CMS/HCC)    • JC (obstructive sleep apnea)     cpap   • Prostate cancer (CMS/HCC)     RADIATION TREATMENTS       Past Surgical History:   Procedure Laterality Date   • ANKLE OPEN REDUCTION INTERNAL FIXATION Right 2019    Procedure: OPEN REDUCTION INTERNAL FIXATION RIGHT LATERAL MALLEOLUS  FRACTURE AND SYNDESMOSIS;  Surgeon: Ken Abreu MD;  Location: [facility];  Service: Orthopedics   • CORONARY ARTERY BYPASS GRAFT     • SKIN BIOPSY         Family History: family history includes No Known Problems in his father and mother. Otherwise pertinent FHx was reviewed and unremarkable.     Social History:  reports that he has never smoked. He has never used smokeless tobacco. He reports that he drinks about 0.6 oz of alcohol per week. He reports that he does not use drugs.  Social History     Social History Narrative    Lori, daughter with pt today       Medications:    Available home medication information reviewed.  Medications Prior to Admission   Medication Sig Dispense Refill Last Dose   • albuterol (PROVENTIL) (2.5 MG/3ML) 0.083% nebulizer solution Take 2.5 mg by nebulization Every 8 (Eight) Hours As Needed for Wheezing.   11/6/2019 at Unknown time   • albuterol sulfate  (90 Base) MCG/ACT inhaler Inhale 1-2 puffs Every 4 (Four) Hours As Needed for Wheezing.   11/6/2019 at Unknown time   • allopurinol (ZYLOPRIM) 100 MG tablet Take 100 mg by mouth Daily.   11/6/2019 at Unknown time   • aspirin 325 MG EC tablet Take 325 mg by mouth Daily.   11/6/2019 at Unknown time   • Budesonide-Formoterol Fumarate (SYMBICORT IN) Inhale Daily.   11/6/2019 at Unknown time   • citalopram (CeleXA) 10 MG tablet Take 10 mg by mouth Daily.   11/6/2019 at Unknown time   • doxazosin (CARDURA) 2 MG tablet Take 2 mg by mouth Every Night.   11/5/2019 at Unknown time   • fluticasone (FLONASE) 50 MCG/ACT nasal spray 2 sprays by Each Nare route Daily.   11/6/2019 at Unknown time   • Fluticasone Furoate-Vilanterol (BREO ELLIPTA IN) Inhale Daily.   11/6/2019 at Unknown time   • furosemide (LASIX) 20 MG tablet Take 1 tablet by mouth Daily. (Patient taking differently: Take 40 mg by mouth 3 (Three) Times a Week. M-W-F)   11/6/2019 at Unknown time   • glipiZIDE (GLUCOTROL) 5 MG tablet Take 10 mg by mouth Every Morning.  5MG IN PM   11/6/2019 at Unknown time   • Insulin Glargine (TOUJEO MAX SOLOSTAR) 300 UNIT/ML solution pen-injector Inject 20 Units under the skin into the appropriate area as directed Every Night.   11/5/2019 at Unknown time   • iron polysaccharides (NIFEREX) 150 MG capsule Take 150 mg by mouth Daily.   11/6/2019 at Unknown time   • Loratadine (CLARITIN) 10 MG capsule Take  by mouth Daily.   11/6/2019 at Unknown time   • metoprolol succinate XL (TOPROL-XL) 100 MG 24 hr tablet Take 1 tablet by mouth Daily. (Patient taking differently: Take 25 mg by mouth 2 (Two) Times a Day.) 90 tablet 0 11/6/2019 at Unknown time   • Multiple Vitamin (MULTI-VITAMIN DAILY PO) Take  by mouth Daily.   11/6/2019 at Unknown time   • simvastatin (ZOCOR) 20 MG tablet Take 20 mg by mouth Every Night.   11/5/2019 at Unknown time   • vitamin B-12 (CYANOCOBALAMIN) 1000 MCG tablet Take 1,000 mcg by mouth Daily.   11/6/2019 at Unknown time   • nitroglycerin (NITROSTAT) 0.4 MG SL tablet Place 1 tablet under the tongue Every 5 (Five) Minutes As Needed for Chest Pain. Take no more than 3 doses in 15 minutes. 25 tablet 0 Taking       No Known Allergies    Objective   Objective     Vital Signs:   Temp:  [97.6 °F (36.4 °C)] 97.6 °F (36.4 °C)  Heart Rate:  [72-77] 72  Resp:  [18] 18  BP: (146-158)/(63-73) 146/63        Physical Exam   Alert, nontoxic appearing, no distress, obese, ox4  Ncat, oroph clear  rrr  Bibasilar insp crackles, no overt wheezes currently  abd soft, obese, nontender  2+ LE edema  Changes of chronic venous stasis BLE  Face symmetric, speech clear, equal   Normal affect  Knees full range of motion      Results Reviewed:  I have personally reviewed current lab and radiology data.    Results from last 7 days   Lab Units 11/06/19  1350   WBC 10*3/mm3 5.35   HEMOGLOBIN g/dL 11.2*   HEMATOCRIT % 37.3*   PLATELETS 10*3/mm3 167   INR  1.15     Results from last 7 days   Lab Units 11/06/19  1350   SODIUM mmol/L 138   POTASSIUM mmol/L  4.8   CHLORIDE mmol/L 102   CO2 mmol/L 24.0   BUN mg/dL 28*   CREATININE mg/dL 1.68*   GLUCOSE mg/dL 218*   CALCIUM mg/dL 8.8   ALT (SGPT) U/L 10   AST (SGOT) U/L 19   TROPONIN T ng/mL 0.041*   PROBNP pg/mL 1,764.0     Estimated Creatinine Clearance: 45.3 mL/min (A) (by C-G formula based on SCr of 1.68 mg/dL (H)).  Brief Urine Lab Results  (Last result in the past 365 days)      Color   Clarity   Blood   Leuk Est   Nitrite   Protein   CREAT   Urine HCG        02/28/19 2131 Yellow Clear Negative Negative Negative Negative             Imaging Results (Last 24 Hours)     Procedure Component Value Units Date/Time    XR Chest 1 View [[id number]] Collected:  11/06/19 1438     Updated:  11/06/19 1438    Narrative:          EXAMINATION: XR CHEST 1 VW-      INDICATION: dyspnea, chf      COMPARISON: 2/27/2019     FINDINGS: There are postoperative cardiac changes. Vascular congestive  changes have improved when compared with 2/27/2019. There are trace  bilateral pleural effusions. There is no consolidation or mass.           Impression:       Mild congestive changes, improved when compared with  2/27/2019.              Results for orders placed during the hospital encounter of 02/22/19   Adult Transthoracic Echo Complete W/ Cont if Necessary Per Protocol    Narrative · Left ventricular systolic function is low normal. Estimated EF = 45%.   Difficult to accurately assess regional wall motion abnormality and EF due   to frequent ventricular ectopy.  · Left ventricular diastolic dysfunction (grade I) consistent with   impaired relaxation.  · Left atrial cavity size is mildly dilated.  · Mild mitral valve regurgitation is present  · Moderate tricuspid valve regurgitation is present.  · Estimated right ventricular systolic pressure from tricuspid   regurgitation is markedly elevated (64 mmHg).          Assessment/Plan   Assessment / Plan     Active Hospital Problems    Diagnosis POA   • **Systolic CHF, acute on chronic (CMS/HCC)  [I50.23] Unknown     · Echo (2/26/2019): LVEF 45%.  No significant valvular abnormality.  RVSP 64 mmHg     • JC (obstructive sleep apnea) [G47.33] Yes     cpap     • Coronary artery disease involving native coronary artery of native heart with angina pectoris (CMS/[facility]) [I25.119] Yes     · CABG (1998): LIMA to LAD, SVG to circumflex, SVG to RPDA, SVG to diagonal. Normal LVEF  · Cardiac cath (2003): 3/4 grafts patent. Normal LVEF.  Medical management  · PET stress (2013): Inferior infarct with ischemia. Medical management recommended     • Ischemic cardiomyopathy [I25.5] Yes   • Severe obesity (BMI 35.0-35.9 with comorbidity) (CMS/HCC) [E66.01, Z68.35] Not Applicable   • Type 2 diabetes mellitus with diabetic polyneuropathy, with long-term current use of insulin (CMS/HCC) [E11.42, Z79.4] Not Applicable   • COPD (chronic obstructive pulmonary disease) (CMS/HCC) [J44.9] Yes       Plan:  -bumex 1mg iv now & q12 x 3 doses ordered; moniotor renal function (per February 2019 nephrology note, baseline creatinine ~1.7-1.8). If renal function rising above baseline consider nephrology consultation  -echo ordered  -continue asa, toprol, statin. Not on ace or arb presumably due to renal insufficiency  -cardiology consultation for 11/7/19 (was to see Dr. Govea 12/3/19 in clinic)  -cardiac, 2g sodium, diabetic diet; daily weights, strict I's/o's  -continue hytrin, bladder scan, in/out cath prn for retention  -basal bolus insulins, adjust prn  -duonebs prn; continue symbicort  -cpap nightly    Am labs: bmp,mag      DVT prophylaxis:  Sq heparin    CODE STATUS:    Code Status and Medical Interventions:   Ordered at: 11/06/19 1539     Code Status:    CPR     Medical Interventions (Level of Support Prior to Arrest):    Full       Admission Status:  I believe this patient meets inpatient status due to expected hospital stay > 2 midnights with need for intravenous diuresis and close monitoring of renal function      Electronically  signed by Kwan King MD, 11/06/19, 3:42 PM.

## 2019-11-07 ENCOUNTER — APPOINTMENT (OUTPATIENT)
Dept: CARDIOLOGY | Facility: HOSPITAL | Age: 84
End: 2019-11-07

## 2019-11-07 PROBLEM — I50.33 ACUTE ON CHRONIC DIASTOLIC CONGESTIVE HEART FAILURE (HCC): Status: ACTIVE | Noted: 2019-02-22

## 2019-11-07 PROBLEM — I50.43 ACUTE ON CHRONIC COMBINED SYSTOLIC AND DIASTOLIC CONGESTIVE HEART FAILURE: Status: ACTIVE | Noted: 2019-02-22

## 2019-11-07 PROBLEM — R77.8 ELEVATED TROPONIN: Status: ACTIVE | Noted: 2019-11-07

## 2019-11-07 LAB
ANION GAP SERPL CALCULATED.3IONS-SCNC: 12 MMOL/L (ref 5–15)
BH CV ECHO MEAS - AO MAX PG (FULL): 4.3 MMHG
BH CV ECHO MEAS - AO MAX PG: 7.2 MMHG
BH CV ECHO MEAS - AO MEAN PG (FULL): 2.4 MMHG
BH CV ECHO MEAS - AO MEAN PG: 3.9 MMHG
BH CV ECHO MEAS - AO ROOT AREA (BSA CORRECTED): 1.4
BH CV ECHO MEAS - AO ROOT AREA: 9.6 CM^2
BH CV ECHO MEAS - AO ROOT DIAM: 3.5 CM
BH CV ECHO MEAS - AO V2 MAX: 133.8 CM/SEC
BH CV ECHO MEAS - AO V2 MEAN: 93.9 CM/SEC
BH CV ECHO MEAS - AO V2 VTI: 27.5 CM
BH CV ECHO MEAS - AVA(I,A): 2.2 CM^2
BH CV ECHO MEAS - AVA(I,D): 2.2 CM^2
BH CV ECHO MEAS - AVA(V,A): 2.1 CM^2
BH CV ECHO MEAS - AVA(V,D): 2.1 CM^2
BH CV ECHO MEAS - BSA(HAYCOCK): 2.6 M^2
BH CV ECHO MEAS - BSA: 2.5 M^2
BH CV ECHO MEAS - BZI_BMI: 37.8 KILOGRAMS/M^2
BH CV ECHO MEAS - BZI_METRIC_HEIGHT: 182.9 CM
BH CV ECHO MEAS - BZI_METRIC_WEIGHT: 126.6 KG
BH CV ECHO MEAS - EDV(CUBED): 91.6 ML
BH CV ECHO MEAS - EDV(MOD-SP2): 243 ML
BH CV ECHO MEAS - EDV(MOD-SP4): 262 ML
BH CV ECHO MEAS - EDV(TEICH): 92.8 ML
BH CV ECHO MEAS - EF(CUBED): 58.7 %
BH CV ECHO MEAS - EF(MOD-BP): 45 %
BH CV ECHO MEAS - EF(MOD-SP2): 42.8 %
BH CV ECHO MEAS - EF(MOD-SP4): 46.2 %
BH CV ECHO MEAS - EF(TEICH): 50.4 %
BH CV ECHO MEAS - ESV(CUBED): 37.9 ML
BH CV ECHO MEAS - ESV(MOD-SP2): 139 ML
BH CV ECHO MEAS - ESV(MOD-SP4): 141 ML
BH CV ECHO MEAS - ESV(TEICH): 46 ML
BH CV ECHO MEAS - FS: 25.5 %
BH CV ECHO MEAS - IVS/LVPW: 1
BH CV ECHO MEAS - IVSD: 1.2 CM
BH CV ECHO MEAS - LA DIMENSION: 4.1 CM
BH CV ECHO MEAS - LA/AO: 1.2
BH CV ECHO MEAS - LAD MAJOR: 4.7 CM
BH CV ECHO MEAS - LAT PEAK E' VEL: 9.1 CM/SEC
BH CV ECHO MEAS - LATERAL E/E' RATIO: 8.6
BH CV ECHO MEAS - LV DIASTOLIC VOL/BSA (35-75): 106.8 ML/M^2
BH CV ECHO MEAS - LV MASS(C)D: 183.1 GRAMS
BH CV ECHO MEAS - LV MASS(C)DI: 74.6 GRAMS/M^2
BH CV ECHO MEAS - LV MAX PG: 2.8 MMHG
BH CV ECHO MEAS - LV MEAN PG: 1.5 MMHG
BH CV ECHO MEAS - LV SYSTOLIC VOL/BSA (12-30): 57.5 ML/M^2
BH CV ECHO MEAS - LV V1 MAX: 84.4 CM/SEC
BH CV ECHO MEAS - LV V1 MEAN: 57 CM/SEC
BH CV ECHO MEAS - LV V1 VTI: 18.3 CM
BH CV ECHO MEAS - LVIDD: 4.5 CM
BH CV ECHO MEAS - LVIDS: 3.4 CM
BH CV ECHO MEAS - LVLD AP2: 9.6 CM
BH CV ECHO MEAS - LVLD AP4: 9.9 CM
BH CV ECHO MEAS - LVLS AP2: 9 CM
BH CV ECHO MEAS - LVLS AP4: 8.9 CM
BH CV ECHO MEAS - LVOT AREA (M): 3.1 CM^2
BH CV ECHO MEAS - LVOT AREA: 3.3 CM^2
BH CV ECHO MEAS - LVOT DIAM: 2 CM
BH CV ECHO MEAS - LVPWD: 1.1 CM
BH CV ECHO MEAS - MED PEAK E' VEL: 10.5 CM/SEC
BH CV ECHO MEAS - MEDIAL E/E' RATIO: 7.5
BH CV ECHO MEAS - MV A MAX VEL: 96.7 CM/SEC
BH CV ECHO MEAS - MV DEC TIME: 0.25 SEC
BH CV ECHO MEAS - MV E MAX VEL: 80 CM/SEC
BH CV ECHO MEAS - MV E/A: 0.83
BH CV ECHO MEAS - MV MAX PG: 5.4 MMHG
BH CV ECHO MEAS - MV MEAN PG: 1.7 MMHG
BH CV ECHO MEAS - MV V2 MAX: 116.2 CM/SEC
BH CV ECHO MEAS - MV V2 MEAN: 59.8 CM/SEC
BH CV ECHO MEAS - MV V2 VTI: 35.8 CM
BH CV ECHO MEAS - MVA(VTI): 1.7 CM^2
BH CV ECHO MEAS - PA ACC SLOPE: 486.3 CM/SEC^2
BH CV ECHO MEAS - PA ACC TIME: 0.11 SEC
BH CV ECHO MEAS - PA PR(ACCEL): 28.3 MMHG
BH CV ECHO MEAS - SI(AO): 107.4 ML/M^2
BH CV ECHO MEAS - SI(CUBED): 21.9 ML/M^2
BH CV ECHO MEAS - SI(LVOT): 24.3 ML/M^2
BH CV ECHO MEAS - SI(MOD-SP2): 42.4 ML/M^2
BH CV ECHO MEAS - SI(MOD-SP4): 49.3 ML/M^2
BH CV ECHO MEAS - SI(TEICH): 19.1 ML/M^2
BH CV ECHO MEAS - SV(AO): 263.6 ML
BH CV ECHO MEAS - SV(CUBED): 53.7 ML
BH CV ECHO MEAS - SV(LVOT): 59.7 ML
BH CV ECHO MEAS - SV(MOD-SP2): 104 ML
BH CV ECHO MEAS - SV(MOD-SP4): 121 ML
BH CV ECHO MEAS - SV(TEICH): 46.8 ML
BH CV ECHO MEAS - TAPSE (>1.6): 1.8 CM2
BH CV ECHO MEASUREMENTS AVERAGE E/E' RATIO: 8.16
BH CV VAS BP LEFT ARM: NORMAL MMHG
BH CV XLRA - RV BASE: 3.9 CM
BH CV XLRA - RV LENGTH: 6.7 CM
BH CV XLRA - RV MID: 3.4 CM
BH CV XLRA - TDI S': 11.7 CM/SEC
BUN BLD-MCNC: 32 MG/DL (ref 8–23)
BUN/CREAT SERPL: 17.3 (ref 7–25)
CALCIUM SPEC-SCNC: 8.8 MG/DL (ref 8.6–10.5)
CHLORIDE SERPL-SCNC: 102 MMOL/L (ref 98–107)
CO2 SERPL-SCNC: 24 MMOL/L (ref 22–29)
CREAT BLD-MCNC: 1.85 MG/DL (ref 0.76–1.27)
GFR SERPL CREATININE-BSD FRML MDRD: 35 ML/MIN/1.73
GLUCOSE BLD-MCNC: 137 MG/DL (ref 65–99)
GLUCOSE BLDC GLUCOMTR-MCNC: 124 MG/DL (ref 70–130)
GLUCOSE BLDC GLUCOMTR-MCNC: 128 MG/DL (ref 70–130)
GLUCOSE BLDC GLUCOMTR-MCNC: 173 MG/DL (ref 70–130)
GLUCOSE BLDC GLUCOMTR-MCNC: 221 MG/DL (ref 70–130)
LEFT ATRIUM VOLUME INDEX: 37.9 ML/M^2
LEFT ATRIUM VOLUME: 93 ML
LV EF 2D ECHO EST: 47 %
MAGNESIUM SERPL-MCNC: 2.1 MG/DL (ref 1.6–2.4)
MAXIMAL PREDICTED HEART RATE: 137 BPM
POTASSIUM BLD-SCNC: 4.5 MMOL/L (ref 3.5–5.2)
SODIUM BLD-SCNC: 138 MMOL/L (ref 136–145)
STRESS TARGET HR: 116 BPM
TROPONIN T SERPL-MCNC: 0.05 NG/ML (ref 0–0.03)

## 2019-11-07 PROCEDURE — 80048 BASIC METABOLIC PNL TOTAL CA: CPT | Performed by: INTERNAL MEDICINE

## 2019-11-07 PROCEDURE — 83735 ASSAY OF MAGNESIUM: CPT | Performed by: INTERNAL MEDICINE

## 2019-11-07 PROCEDURE — 94640 AIRWAY INHALATION TREATMENT: CPT

## 2019-11-07 PROCEDURE — 99221 1ST HOSP IP/OBS SF/LOW 40: CPT | Performed by: INTERNAL MEDICINE

## 2019-11-07 PROCEDURE — 63710000001 INSULIN DETEMIR PER 5 UNITS: Performed by: INTERNAL MEDICINE

## 2019-11-07 PROCEDURE — 93306 TTE W/DOPPLER COMPLETE: CPT

## 2019-11-07 PROCEDURE — 25010000002 HEPARIN (PORCINE) PER 1000 UNITS: Performed by: INTERNAL MEDICINE

## 2019-11-07 PROCEDURE — 99232 SBSQ HOSP IP/OBS MODERATE 35: CPT | Performed by: FAMILY MEDICINE

## 2019-11-07 PROCEDURE — 84484 ASSAY OF TROPONIN QUANT: CPT | Performed by: NURSE PRACTITIONER

## 2019-11-07 PROCEDURE — 93306 TTE W/DOPPLER COMPLETE: CPT | Performed by: INTERNAL MEDICINE

## 2019-11-07 PROCEDURE — 82962 GLUCOSE BLOOD TEST: CPT

## 2019-11-07 PROCEDURE — 25010000002 SULFUR HEXAFLUORIDE MICROSPH 60.7-25 MG RECONSTITUTED SUSPENSION: Performed by: INTERNAL MEDICINE

## 2019-11-07 RX ORDER — NYSTATIN 100000 [USP'U]/G
POWDER TOPICAL EVERY 12 HOURS SCHEDULED
Status: DISCONTINUED | OUTPATIENT
Start: 2019-11-07 | End: 2019-11-09 | Stop reason: HOSPADM

## 2019-11-07 RX ADMIN — SODIUM CHLORIDE, PRESERVATIVE FREE 10 ML: 5 INJECTION INTRAVENOUS at 08:19

## 2019-11-07 RX ADMIN — BUMETANIDE 1 MG: 0.25 INJECTION INTRAMUSCULAR; INTRAVENOUS at 08:13

## 2019-11-07 RX ADMIN — FAMOTIDINE 20 MG: 20 TABLET ORAL at 17:18

## 2019-11-07 RX ADMIN — INSULIN DETEMIR 14 UNITS: 100 INJECTION, SOLUTION SUBCUTANEOUS at 20:29

## 2019-11-07 RX ADMIN — BUDESONIDE AND FORMOTEROL FUMARATE DIHYDRATE 2 PUFF: 80; 4.5 AEROSOL RESPIRATORY (INHALATION) at 09:41

## 2019-11-07 RX ADMIN — FAMOTIDINE 20 MG: 20 TABLET ORAL at 08:18

## 2019-11-07 RX ADMIN — METOPROLOL SUCCINATE 25 MG: 25 TABLET, EXTENDED RELEASE ORAL at 20:28

## 2019-11-07 RX ADMIN — INSULIN LISPRO 4 UNITS: 100 INJECTION, SOLUTION INTRAVENOUS; SUBCUTANEOUS at 12:16

## 2019-11-07 RX ADMIN — TERAZOSIN HYDROCHLORIDE ANHYDROUS 2 MG: 2 CAPSULE ORAL at 20:28

## 2019-11-07 RX ADMIN — ASPIRIN 325 MG ORAL TABLET 162 MG: 325 PILL ORAL at 08:15

## 2019-11-07 RX ADMIN — HEPARIN SODIUM 5000 UNITS: 5000 INJECTION, SOLUTION INTRAVENOUS; SUBCUTANEOUS at 08:15

## 2019-11-07 RX ADMIN — SODIUM CHLORIDE, PRESERVATIVE FREE 10 ML: 5 INJECTION INTRAVENOUS at 20:37

## 2019-11-07 RX ADMIN — HEPARIN SODIUM 5000 UNITS: 5000 INJECTION, SOLUTION INTRAVENOUS; SUBCUTANEOUS at 20:28

## 2019-11-07 RX ADMIN — ATORVASTATIN CALCIUM 10 MG: 10 TABLET, FILM COATED ORAL at 20:28

## 2019-11-07 RX ADMIN — INSULIN LISPRO 2 UNITS: 100 INJECTION, SOLUTION INTRAVENOUS; SUBCUTANEOUS at 17:18

## 2019-11-07 RX ADMIN — FERROUS SULFATE TAB 325 MG (65 MG ELEMENTAL FE) 325 MG: 325 (65 FE) TAB at 08:18

## 2019-11-07 RX ADMIN — METOPROLOL SUCCINATE 25 MG: 25 TABLET, EXTENDED RELEASE ORAL at 08:16

## 2019-11-07 RX ADMIN — CYANOCOBALAMIN TAB 1000 MCG 1000 MCG: 1000 TAB at 08:16

## 2019-11-07 RX ADMIN — CETIRIZINE HYDROCHLORIDE 5 MG: 10 TABLET, FILM COATED ORAL at 08:17

## 2019-11-07 RX ADMIN — FLUTICASONE PROPIONATE 2 SPRAY: 50 SPRAY, METERED NASAL at 08:14

## 2019-11-07 RX ADMIN — SULFUR HEXAFLUORIDE 3 ML: KIT at 10:35

## 2019-11-07 RX ADMIN — CITALOPRAM HYDROBROMIDE 10 MG: 20 TABLET ORAL at 08:17

## 2019-11-07 RX ADMIN — BUMETANIDE 1 MG: 0.25 INJECTION INTRAMUSCULAR; INTRAVENOUS at 20:28

## 2019-11-07 NOTE — CONSULTS
Inpatient Cardiology Consult  Consult performed by: Sanjay Govea IV, MD  Consult ordered by: Kwan King MD  Reason for consult: Acute on chronic CHF        [facility]  Cardiology Consult Note           84-year-old gentleman with a history of chronic diastolic heart failure with LVEF 45%, coronary artery disease status post bypass surgery, and CKD stage III being followed by [facility].  The patient was in nephrology office yesterday and was complaining of worsening swelling and shortness of breath at rest and with any exertion.  Nephrology service has been adjusting his loop diuretic therapy due to worsening renal function.  The patient has been taking furosemide 40 mg 3 times a week.  Prior to admission, the patient experience orthopnea.  The patient describes occasional chest pressure which is improved with albuterol inhaler.  He is not used nitroglycerin in years.    The patient was admitted to the hospitalist service and given IV bumetanide I with improvement in symptoms.  He still remains short of breath once getting out of his bed.  The patient's daughter is in the room and reports that the patient has had poor dietary discretion and increased salt intake due to buying premade pieces and prepackaged food at the grocery store.    An echocardiogram was performed this morning and shows LVEF 45%, which is unchanged from February when he was admitted for a fall and ankle fracture.    No Known Allergies    No current facility-administered medications on file prior to encounter.      Current Outpatient Medications on File Prior to Encounter   Medication Sig   • albuterol (PROVENTIL) (2.5 MG/3ML) 0.083% nebulizer solution Take 2.5 mg by nebulization Every 8 (Eight) Hours As Needed for Wheezing.   • albuterol sulfate  (90 Base) MCG/ACT inhaler Inhale 1-2 puffs Every 4 (Four) Hours As Needed for Wheezing.   • allopurinol  (ZYLOPRIM) 100 MG tablet Take 100 mg by mouth Daily.   • aspirin 325 MG EC tablet Take 325 mg by mouth Daily.   • Budesonide-Formoterol Fumarate (SYMBICORT IN) Inhale Daily.   • citalopram (CeleXA) 10 MG tablet Take 10 mg by mouth Daily.   • doxazosin (CARDURA) 2 MG tablet Take 2 mg by mouth Every Night.   • fluticasone (FLONASE) 50 MCG/ACT nasal spray 2 sprays by Each Nare route Daily.   • Fluticasone Furoate-Vilanterol (BREO ELLIPTA IN) Inhale Daily.   • furosemide (LASIX) 20 MG tablet Take 1 tablet by mouth Daily. (Patient taking differently: Take 40 mg by mouth 3 (Three) Times a Week. M-W-F)   • glipiZIDE (GLUCOTROL) 5 MG tablet Take 10 mg by mouth Every Morning. 5MG IN PM   • Insulin Glargine (TOUJEO MAX SOLOSTAR) 300 UNIT/ML solution pen-injector Inject 20 Units under the skin into the appropriate area as directed Every Night.   • iron polysaccharides (NIFEREX) 150 MG capsule Take 150 mg by mouth Daily.   • Loratadine (CLARITIN) 10 MG capsule Take  by mouth Daily.   • metoprolol succinate XL (TOPROL-XL) 100 MG 24 hr tablet Take 1 tablet by mouth Daily. (Patient taking differently: Take 25 mg by mouth 2 (Two) Times a Day.)   • Multiple Vitamin (MULTI-VITAMIN DAILY PO) Take  by mouth Daily.   • simvastatin (ZOCOR) 20 MG tablet Take 20 mg by mouth Every Night.   • vitamin B-12 (CYANOCOBALAMIN) 1000 MCG tablet Take 1,000 mcg by mouth Daily.   • nitroglycerin (NITROSTAT) 0.4 MG SL tablet Place 1 tablet under the tongue Every 5 (Five) Minutes As Needed for Chest Pain. Take no more than 3 doses in 15 minutes.         Past Medical History:   Diagnosis Date   • Basal cell carcinoma    • CKD (chronic kidney disease)    • COPD (chronic obstructive pulmonary disease) (CMS/HCC)    • Coronary artery disease    • Diabetes mellitus (CMS/HCC)    • Ejection fraction < 50%     EF 35%   • H/O seasonal allergies    • Hypertension    • Myocardial infarction (CMS/HCC)    • JC (obstructive sleep apnea)     cpap   • Prostate cancer  (CMS/[facility])     RADIATION TREATMENTS     Past Surgical History:   Procedure Laterality Date   • ANKLE OPEN REDUCTION INTERNAL FIXATION Right 2/23/2019    Procedure: OPEN REDUCTION INTERNAL FIXATION RIGHT LATERAL MALLEOLUS FRACTURE AND SYNDESMOSIS;  Surgeon: Ken Abreu MD;  Location:  [facility];  Service: Orthopedics   • CORONARY ARTERY BYPASS GRAFT     • SKIN BIOPSY       Past Surgical History:   Procedure Laterality Date   • ANKLE OPEN REDUCTION INTERNAL FIXATION Right 2/23/2019    Procedure: OPEN REDUCTION INTERNAL FIXATION RIGHT LATERAL MALLEOLUS FRACTURE AND SYNDESMOSIS;  Surgeon: Ken Abreu MD;  Location:  [facility];  Service: Orthopedics   • CORONARY ARTERY BYPASS GRAFT     • SKIN BIOPSY        Family History   Problem Relation Age of Onset   • No Known Problems Mother    • No Known Problems Father      Social History     Tobacco Use   Smoking Status Never Smoker   Smokeless Tobacco Never Used     Social History     Substance and Sexual Activity   Alcohol Use Yes   • Alcohol/week: 0.6 oz   • Types: 1 Shots of liquor per week     Past Surgical History:   Procedure Laterality Date   • ANKLE OPEN REDUCTION INTERNAL FIXATION Right 2/23/2019    Procedure: OPEN REDUCTION INTERNAL FIXATION RIGHT LATERAL MALLEOLUS FRACTURE AND SYNDESMOSIS;  Surgeon: Ken Abreu MD;  Location:  [facility];  Service: Orthopedics   • CORONARY ARTERY BYPASS GRAFT     • SKIN BIOPSY           Review of Systems:   See admission H&P dated 11/7/2019 for review of systems       Vital Sign Min/Max for last 24 hours  Temp  Min: 98.1 °F (36.7 °C)  Max: 98.6 °F (37 °C)   BP  Min: 126/60  Max: 151/72   Pulse  Min: 69  Max: 87   Resp  Min: 20  Max: 20   SpO2  Min: 90 %  Max: 94 %   No Data Recorded      Intake/Output Summary (Last 24 hours) at 11/7/2019 1515  Last data filed at 11/7/2019 1430  Gross per 24 hour   Intake 240 ml   Output 4090 ml   Net -3850 ml           Physical Exam   Constitutional: He appears  well-developed.   HENT:   Head: Normocephalic and atraumatic.   Eyes: Conjunctivae are normal.   Neck: Neck supple. No JVD present. No thyromegaly present.   Cardiovascular: Normal rate and regular rhythm. Exam reveals no gallop.   No murmur heard.  Pulses:       Carotid pulses are 2+ on the right side, and 2+ on the left side.  2+ edema BLE   Pulmonary/Chest: Effort normal and breath sounds normal.   Abdominal: Soft. There is no tenderness.   Musculoskeletal: He exhibits no edema.   Skin: Skin is warm and dry.   Psychiatric: He has a normal mood and affect. His behavior is normal.       Tele: Sinus rhythm    DATA REVIEW:    EKG (11/6/2019): Sinus rhythm with first-degree AV block    CXR (11/6/2019): Mild congestive changes which appear improved compared to 2/27/2019    Images of echo performed today were reviewed.  LVEF 47%.  No significant valvular abnormality.      Results from last 7 days   Lab Units 11/07/19  0536 11/06/19  1350   SODIUM mmol/L 138 138   POTASSIUM mmol/L 4.5 4.8   CHLORIDE mmol/L 102 102   BUN mg/dL 32* 28*   CREATININE mg/dL 1.85* 1.68*   MAGNESIUM mg/dL 2.1 2.0     Results from last 7 days   Lab Units 11/07/19  1159 11/06/19  1350   TROPONIN T ng/mL 0.053* 0.041*     Results from last 7 days   Lab Units 11/06/19  1350   WBC 10*3/mm3 5.35   HEMOGLOBIN g/dL 11.2*   HEMATOCRIT % 37.3*   PLATELETS 10*3/mm3 167     Lab Results   Component Value Date    HGBA1C 5.90 (H) 11/06/2019     Lab Results   Component Value Date    AST 19 11/06/2019    ALT 10 11/06/2019            Active Hospital Problems    Diagnosis POA   • **Acute on chronic diastolic congestive heart failure (CMS/HCC) [I50.33] Yes     Priority: High     · Echo (2/26/2019): LVEF 45%.  Mild MR.  Moderate TR.  RVSP 64 mmHg  · Echo (11/7/2019): LVEF 47%. Mild septal asymmetric hypertrophy.     • Elevated troponin [R79.89] Yes     Priority: Medium     · Mild and flat troponin elevation in the setting of chronic renal insufficiency not  concerning for acute coronary syndrome     • Coronary artery disease involving native coronary artery of native heart with angina pectoris (CMS/[facility]) [I25.119] Yes     Priority: Medium     · CABG (1998): LIMA to LAD, SVG to circumflex, SVG to RPDA, SVG to diagonal. Normal LVEF  · Cardiac cath (2003): 3/4 grafts patent. Normal LVEF.  Medical management  · PET stress (2013): Inferior infarct with ischemia. Medical management recommended     • COPD (chronic obstructive pulmonary disease) (CMS/[facility]) [J44.9] Yes     Priority: Medium   • CKD (chronic kidney disease) stage 3, GFR 30-59 ml/min (CMS/[facility]) [N18.3] Yes     Priority: Medium   • Hyperlipidemia LDL goal <70 [E78.5] Yes   • JC (obstructive sleep apnea) [G47.33] Yes   • Severe obesity (BMI 35.0-35.9 with comorbidity) (CMS/[facility]) [E66.01, Z68.35] Not Applicable   • Type 2 diabetes mellitus with diabetic polyneuropathy, with long-term current use of insulin (CMS/HCC) [E11.42, Z79.4] Not Applicable   • Essential hypertension [I10] Yes     84-year-old gentleman admitted for acute on chronic diastolic heart failure in the setting of advancing chronic kidney disease.  Nephrology has been adjusting diuretics and unfortunately this is resulted in volume overload precipitating this admission.  Given his renal function sensitivity to loop diuretics, I would prefer these be handled by the nephrology service.  Echocardiogram shows stable LV systolic function and the patient has no clinical history to suggest acute coronary syndrome as a etiology of his decompensated heart failure.  We discussed diet and particularly salt restriction.  The patient's daughter appears eager to help with her father make better dietary choices.         · Nephrology to dictate diuretic regimen  · No ischemic evaluation recommended at this time  · Follow-up with Sara Gutierrez on 12/3/2019  · CHF nurse navigator to see  · Please call with any additional questions    ALIYAH Govea MD [facility],  FSCAI  Interventional and General Cardiology

## 2019-11-07 NOTE — PROGRESS NOTES
[facility] Medicine Services  PROGRESS NOTE    Patient Name: Lico Diego  : 1935  MRN: [id number]    Date of Admission: 2019  Primary Care Physician: Khai Menjivar DO    Subjective   Subjective     CC:  F/U CHF exacerbation    HPI:  Patient seen and examined.  Nursing notes reviewed.  No acute events overnight.  Patient states his breathing is improved this morning.  His lower extremity swelling is improved.    Review of Systems  Gen- No fevers, chills  CV- No chest pain, palpitations  Resp- No cough, +dyspnea (improved)  GI- No N/V/D, abd pain        Objective   Objective     Vital Signs:   Temp:  [98.1 °F (36.7 °C)-98.6 °F (37 °C)] 98.1 °F (36.7 °C)  Heart Rate:  [69-84] 79  Resp:  [20] 20  BP: (126-151)/(59-73) 146/59        Physical Exam:  Constitutional: No acute distress, awake, alert  HENT: NCAT, mucous membranes moist  Respiratory: Bibasilar rales, respiratory effort normal   Cardiovascular: RRR, no murmurs, rubs, or gallops, palpable pedal pulses bilaterally  Gastrointestinal: Positive bowel sounds, soft, nontender, nondistended  Musculoskeletal: B/L LE 1+ edema  Psychiatric: Appropriate affect, cooperative  Neurologic: Oriented x 3, strength symmetric in all extremities, Cranial Nerves grossly intact to confrontation, speech clear  Skin: No rashes    Results Reviewed:    Results from last 7 days   Lab Units 19  1350   WBC 10*3/mm3 5.35   HEMOGLOBIN g/dL 11.2*   HEMATOCRIT % 37.3*   PLATELETS 10*3/mm3 167   INR  1.15     Results from last 7 days   Lab Units 19  0536 19  1350   SODIUM mmol/L 138 138   POTASSIUM mmol/L 4.5 4.8   CHLORIDE mmol/L 102 102   CO2 mmol/L 24.0 24.0   BUN mg/dL 32* 28*   CREATININE mg/dL 1.85* 1.68*   GLUCOSE mg/dL 137* 218*   CALCIUM mg/dL 8.8 8.8   ALT (SGPT) U/L  --  10   AST (SGOT) U/L  --  19   TROPONIN T ng/mL  --  0.041*   PROBNP pg/mL  --  1,764.0     Estimated Creatinine Clearance: 41.7 mL/min (A) (by C-G  formula based on SCr of 1.85 mg/dL (H)).    Microbiology Results Abnormal     None          Imaging Results (Last 24 Hours)     Procedure Component Value Units Date/Time    XR Chest 1 View [[id number]] Collected:  11/06/19 1438     Updated:  11/07/19 0838    Narrative:       EXAMINATION: XR CHEST 1 VW- 11/06/2019     INDICATION: Dyspnea, CHF      COMPARISON: 02/27/2019     FINDINGS: There are postoperative cardiac changes. Vascular congestive  changes have improved when compared with 02/27/2019. There are trace  bilateral pleural effusions. There is no consolidation or mass.           Impression:       Mild congestive changes, improved when compared with  02/27/2019.     D:  11/06/2019  E:  11/06/2019     This report was finalized on 11/7/2019 8:35 AM by Dr. Wilman Dougherty MD.             Results for orders placed during the hospital encounter of 11/06/19   Adult Transthoracic Echo Complete W/ Cont if Necessary Per Protocol    Narrative · Left ventricular systolic function is low normal. Estimated EF = 47%.  · Left ventricular wall thickness is consistent with mild septal   asymmetric hypertrophy.  · Left atrial cavity size is moderately dilated.  · Right ventricular cavity is mildly dilated.          I have reviewed the medications:  Scheduled Meds:  aspirin 162 mg Oral Daily   atorvastatin 10 mg Oral Nightly   budesonide-formoterol 2 puff Inhalation Daily - RT   bumetanide 1 mg Intravenous Q12H   cetirizine 5 mg Oral Daily   citalopram 10 mg Oral Daily   famotidine 20 mg Oral BID AC   ferrous sulfate 325 mg Oral Daily With Breakfast   fluticasone 2 spray Each Nare Daily   heparin (porcine) 5,000 Units Subcutaneous Q12H   insulin detemir 14 Units Subcutaneous Nightly   insulin lispro 0-9 Units Subcutaneous 4x Daily With Meals & Nightly   metoprolol succinate XL 25 mg Oral BID   pharmacy consult - MTM  Does not apply Daily   sodium chloride 10 mL Intravenous Q12H   terazosin 2 mg Oral Nightly   vitamin B-12 1,000 mcg  Oral Daily     Continuous Infusions:   PRN Meds:.dextrose  •  dextrose  •  glucagon (human recombinant)  •  ipratropium-albuterol  •  ipratropium-albuterol  •  nitroglycerin  •  sennosides-docusate  •  sodium chloride      Assessment/Plan   Assessment / Plan     Active Hospital Problems    Diagnosis  POA   • **Acute on chronic combined systolic and diastolic congestive heart failure (CMS/[facility]) [I50.43]  Yes   • Elevated troponin [R79.89]  Yes   • Hyperlipidemia LDL goal <70 [E78.5]  Yes   • Premature ventricular contractions (PVCs) (VPCs) [I49.3]  Yes   • JC (obstructive sleep apnea) [G47.33]  Yes   • Coronary artery disease involving native coronary artery of native heart with angina pectoris (CMS/[facility]) [I25.119]  Yes   • Ischemic cardiomyopathy [I25.5]  Yes   • Severe obesity (BMI 35.0-35.9 with comorbidity) (CMS/[facility]) [E66.01, Z68.35]  Not Applicable   • Type 2 diabetes mellitus with diabetic polyneuropathy, with long-term current use of insulin (CMS/[facility]) [E11.42, Z79.4]  Not Applicable   • COPD (chronic obstructive pulmonary disease) (CMS/[facility]) [J44.9]  Yes   • Essential hypertension [I10]  Yes   • CKD (chronic kidney disease) stage 3, GFR 30-59 ml/min (CMS/[facility]) [N18.3]  Yes      Resolved Hospital Problems    Diagnosis Date Resolved POA   • Hypoxia [R09.02] 11/06/2019 Yes        Brief Hospital Course to date:  Lico Diego is a 84 y.o. male with PMHx of CHF, sent from Nephrology clinic due to weight gain and SOA.    AE CHF with reduced EF  CAD  Ischemic Cardiomyopathy  Elevated Troponin  -Continue Bumex 1mg IV BID  -Strict I's/O's, daily weights  -2g Sodium diet  -Continue Metoprolol Succinate   -No ACE/ARB due to CKD  -Repeat Echo pending    DM2  -Levemir 14 units at night  -Moderate dose SSI  -Glucoscans ACHS  -a1c 5.9    AKILA on CKD3  -Nephrology consulted, follows with Dr. Martinez  -Pt on Bumex, will monitor closely  -Pt in negative fluid balance    HLD  -continue statin therapy  -FLP in AM    COPD  -Kaden  prn  -Home inhalers continued  -No supplemental O2    HTN  -BP stable    JC  -CPAP at night    DVT Prophylaxis:  Subq Heparin    Disposition: I expect the patient to be discharged home once medically stable      CODE STATUS:   Code Status and Medical Interventions:   Ordered at: 11/06/19 1539     Code Status:    CPR     Medical Interventions (Level of Support Prior to Arrest):    Full         Electronically signed by Tracie Sharma DO, 11/07/19, 1:29 PM.

## 2019-11-07 NOTE — PLAN OF CARE
Problem: Pain, Chronic (Adult)  Goal: Acceptable Pain/Comfort Level and Functional Ability  Outcome: Ongoing (interventions implemented as appropriate)      Problem: Patient Care Overview  Goal: Plan of Care Review  Outcome: Ongoing (interventions implemented as appropriate)   11/07/19 6367   Coping/Psychosocial   Plan of Care Reviewed With patient   OTHER   Outcome Summary Pt. very pleasant; VSS; denies pain except for pressure on chest that he states is related to COPD; UOP on dashift 1500 mL; weight decreased by 10 lbs; pt. improving; will continue to monitor.       Problem: Fall Risk (Adult)  Goal: Absence of Fall  Outcome: Ongoing (interventions implemented as appropriate)      Problem: Activity Intolerance (Adult)  Goal: Identify Related Risk Factors and Signs and Symptoms  Outcome: Ongoing (interventions implemented as appropriate)    Goal: Activity Tolerance  Outcome: Ongoing (interventions implemented as appropriate)    Goal: Effective Energy Conservation Techniques  Outcome: Ongoing (interventions implemented as appropriate)

## 2019-11-07 NOTE — PROGRESS NOTES
Discharge Planning Assessment  [facility]     Patient Name: Lico Diego  MRN: [id number]  Today's Date: 11/7/2019    Admit Date: 11/6/2019    Discharge Needs Assessment     Row Name 11/07/19 1447       Living Environment    Lives With  alone    Current Living Arrangements  home/apartment/condo    Primary Care Provided by  self    Provides Primary Care For  no one    Family Caregiver if Needed  child(kaylin), adult    Able to Return to Prior Arrangements  yes       Transition Planning    Patient/Family Anticipates Transition to  home    Transportation Anticipated  family or friend will provide       Discharge Needs Assessment    Equipment Currently Used at Home  wheelchair;cane, straight;walker, rolling;shower chair;commode;power chair,(recliner lift)        Discharge Plan     Row Name 11/07/19 0084       Plan    Plan  Home    Patient/Family in Agreement with Plan  yes    Plan Comments  Spoke with patient and dtr, Ankita, at bedside. Patient lives alone in a one story in [facility] [place] [facility]. PTA he was independent w/ ADL's and primarily used a WC, and two straight canes, for mobility assist. He was able to transfer independently. Patient has oxygen and CPAP equiment at home, he has had them for several years, and does not have script for either. He has two dtrs, Ankita and Tiffany, both are retired nurses and live within a mile of patient. Patient's wife is currently a resident at the [facility],  [facility]. Patient denies any needs at this time. Plan is to return home. CM following.      Final Discharge Disposition Code  01 - home or self-care        Destination      No service coordination in this encounter.      Durable Medical Equipment      No service coordination in this encounter.      Dialysis/Infusion      No service coordination in this encounter.      Home Medical Care      No service coordination in this encounter.      Therapy      No service coordination in this encounter.      Community  Resources      No service coordination in this encounter.          Demographic Summary     Row Name 11/07/19 1447       General Information    Arrived From  home    Reason for Consult  discharge planning        Functional Status     Row Name 11/07/19 1447       Functional Status    Usual Activity Tolerance  fair    Current Activity Tolerance  fair        Psychosocial    No documentation.       Abuse/Neglect    No documentation.       Legal    No documentation.       Substance Abuse    No documentation.       Patient Forms    No documentation.           Phuong Shaffer RN

## 2019-11-07 NOTE — CONSULTS
Patient Care Team:  Khai Menjivar DO as PCP - General (Family Medicine)  Ti Santiago MD as Consulting Physician (Interventional Cardiology)    Chief complaint: Volume overload     Subjective     84-year-old gentleman with a history of chronic diastolic heart failure with LVEF 45%, coronary artery disease status post bypass surgery, and CKD stage III being followed by [facility].  The patient was in nephrology office yesterday and was complaining of worsening swelling and shortness of breath at rest and with any exertion. Patient was transferred from office to the hospital for IV diuresis.Per review of the records patient has stable CKD stage III his renal function has been up and down but hasn't shown steady decline in last 2 yr. His cr has been btw 1.6-1.9mg/dl occasionally in 2s as well. He has been on thrice weekly lasix but clearly that seems to be not enough. Patient did admit consuming excessive salt intake.        Review of Systems   Constitutional: Negative.  Negative for activity change.   HENT: Negative.    Respiratory: Positive for shortness of breath. Negative for cough, chest tightness, wheezing and stridor.    Cardiovascular: Positive for leg swelling. Negative for chest pain and palpitations.   Gastrointestinal: Negative.  Negative for abdominal distention and abdominal pain.   Endocrine: Negative.    Genitourinary: Negative.    Musculoskeletal: Negative.         Past Medical History:   Diagnosis Date   • Basal cell carcinoma    • CKD (chronic kidney disease)    • COPD (chronic obstructive pulmonary disease) (CMS/HCC)    • Coronary artery disease    • Diabetes mellitus (CMS/HCC)    • Ejection fraction < 50%     EF 35%   • H/O seasonal allergies    • Hypertension    • Myocardial infarction (CMS/HCC)    • JC (obstructive sleep apnea)     cpap   • Prostate cancer (CMS/HCC)     RADIATION TREATMENTS   ,   Past Surgical History:   Procedure Laterality Date   • ANKLE OPEN  REDUCTION INTERNAL FIXATION Right 2/23/2019    Procedure: OPEN REDUCTION INTERNAL FIXATION RIGHT LATERAL MALLEOLUS FRACTURE AND SYNDESMOSIS;  Surgeon: Ken Abreu MD;  Location: [facility], 1928–1983;  Service: Orthopedics   • CORONARY ARTERY BYPASS GRAFT     • SKIN BIOPSY     ,   Family History   Problem Relation Age of Onset   • No Known Problems Mother    • No Known Problems Father    ,   Social History     Tobacco Use   • Smoking status: Never Smoker   • Smokeless tobacco: Never Used   Substance Use Topics   • Alcohol use: Yes     Alcohol/week: 0.6 oz     Types: 1 Shots of liquor per week   • Drug use: No    and   Medications Prior to Admission   Medication Sig Dispense Refill Last Dose   • albuterol (PROVENTIL) (2.5 MG/3ML) 0.083% nebulizer solution Take 2.5 mg by nebulization Every 8 (Eight) Hours As Needed for Wheezing.   11/6/2019 at Unknown time   • albuterol sulfate  (90 Base) MCG/ACT inhaler Inhale 1-2 puffs Every 4 (Four) Hours As Needed for Wheezing.   11/6/2019 at Unknown time   • allopurinol (ZYLOPRIM) 100 MG tablet Take 100 mg by mouth Daily.   11/6/2019 at Unknown time   • aspirin 325 MG EC tablet Take 325 mg by mouth Daily.   11/6/2019 at Unknown time   • citalopram (CeleXA) 10 MG tablet Take 10 mg by mouth Daily.   11/6/2019 at Unknown time   • doxazosin (CARDURA) 2 MG tablet Take 2 mg by mouth Every Night.   11/5/2019 at Unknown time   • fluticasone (FLONASE) 50 MCG/ACT nasal spray 2 sprays by Each Nare route Daily.   11/6/2019 at Unknown time   • Fluticasone Furoate-Vilanterol (BREO ELLIPTA IN) Inhale 1 puff Daily.   11/6/2019 at Unknown time   • furosemide (LASIX) 20 MG tablet Take 1 tablet by mouth Daily. (Patient taking differently: Take 40 mg by mouth 3 (Three) Times a Week. M-W-F)   11/6/2019 at Unknown time   • glipiZIDE (GLUCOTROL) 5 MG tablet Take 5 mg by mouth 2 (Two) Times a Day Before Meals. Take 5mg by mouth in the morning and 2.5mg at 1600   11/6/2019 at Unknown time   •  Insulin Glargine (TOUJEO MAX SOLOSTAR) 300 UNIT/ML solution pen-injector Inject 20 Units under the skin into the appropriate area as directed Every Night.   11/5/2019 at Unknown time   • iron polysaccharides (NIFEREX) 150 MG capsule Take 150 mg by mouth Daily.   11/6/2019 at Unknown time   • Loratadine (CLARITIN) 10 MG capsule Take  by mouth Daily.   11/6/2019 at Unknown time   • metoprolol succinate XL (TOPROL-XL) 100 MG 24 hr tablet Take 1 tablet by mouth Daily. (Patient taking differently: Take 25 mg by mouth 2 (Two) Times a Day.) 90 tablet 0 11/6/2019 at Unknown time   • Multiple Vitamin (MULTI-VITAMIN DAILY PO) Take 1 tablet by mouth Daily.   11/6/2019 at Unknown time   • simvastatin (ZOCOR) 20 MG tablet Take 20 mg by mouth Every Night.   11/5/2019 at Unknown time   • vitamin B-12 (CYANOCOBALAMIN) 1000 MCG tablet Take 1,000 mcg by mouth Daily.   11/6/2019 at Unknown time   • nitroglycerin (NITROSTAT) 0.4 MG SL tablet Place 1 tablet under the tongue Every 5 (Five) Minutes As Needed for Chest Pain. Take no more than 3 doses in 15 minutes. 25 tablet 0 Taking       Objective     Vital Signs  Temp:  [98.1 °F (36.7 °C)-98.6 °F (37 °C)] 98.1 °F (36.7 °C)  Heart Rate:  [69-87] 87  Resp:  [20] 20  BP: (126-151)/(59-73) 146/59    I/O this shift:  In: -   Out: 1488 [Urine:1488]  I/O last 3 completed shifts:  In: 240 [P.O.:240]  Out: 2602 [Urine:2602]    Physical Exam   Constitutional: He is oriented to person, place, and time. He appears well-developed and well-nourished.   HENT:   Head: Normocephalic and atraumatic.   Eyes: EOM are normal. Pupils are equal, round, and reactive to light.   Neck: Normal range of motion. Neck supple.   Cardiovascular: Normal rate, regular rhythm and normal heart sounds.   No murmur heard.  Pulmonary/Chest: Effort normal. He has rales.   Abdominal: Soft. Bowel sounds are normal.   Musculoskeletal: Normal range of motion. He exhibits edema.   Neurological: He is alert and oriented to person,  place, and time.   Skin: Skin is warm and dry.       Physical Exam   Constitutional: He appears well-developed.   HENT:   Head: Normocephalic and atraumatic.   Eyes: Conjunctivae are normal.   Neck: Neck supple. No JVD present. No thyromegaly present.   Cardiovascular: Normal rate and regular rhythm. Exam reveals no gallop.   No murmur heard.  Pulses:       Carotid pulses are 2+ on the right side, and 2+ on the left side.  2+ edema BLE   Pulmonary/Chest: Effort normal and breath sounds normal.   Abdominal: Soft. There is no tenderness.   Musculoskeletal: He exhibits no edema.   Skin: Skin is warm and dry.   Psychiatric: He has a normal mood and affect. His behavior is normal.       Results Review:    I reviewed the patient's new clinical results.    WBC WBC   Date Value Ref Range Status   11/06/2019 5.35 3.40 - 10.80 10*3/mm3 Final      HGB Hemoglobin   Date Value Ref Range Status   11/06/2019 11.2 (L) 13.0 - 17.7 g/dL Final      HCT Hematocrit   Date Value Ref Range Status   11/06/2019 37.3 (L) 37.5 - 51.0 % Final      Platlets No results found for: LABPLAT   MCV MCV   Date Value Ref Range Status   11/06/2019 103.9 (H) 79.0 - 97.0 fL Final          Sodium Sodium   Date Value Ref Range Status   11/07/2019 138 136 - 145 mmol/L Final   11/06/2019 138 136 - 145 mmol/L Final      Potassium Potassium   Date Value Ref Range Status   11/07/2019 4.5 3.5 - 5.2 mmol/L Final   11/06/2019 4.8 3.5 - 5.2 mmol/L Final      Chloride Chloride   Date Value Ref Range Status   11/07/2019 102 98 - 107 mmol/L Final   11/06/2019 102 98 - 107 mmol/L Final      CO2 CO2   Date Value Ref Range Status   11/07/2019 24.0 22.0 - 29.0 mmol/L Final   11/06/2019 24.0 22.0 - 29.0 mmol/L Final      BUN BUN   Date Value Ref Range Status   11/07/2019 32 (H) 8 - 23 mg/dL Final   11/06/2019 28 (H) 8 - 23 mg/dL Final      Creatinine Creatinine   Date Value Ref Range Status   11/07/2019 1.85 (H) 0.76 - 1.27 mg/dL Final   11/06/2019 1.68 (H) 0.76 - 1.27  mg/dL Final      Calcium Calcium   Date Value Ref Range Status   11/07/2019 8.8 8.6 - 10.5 mg/dL Final   11/06/2019 8.8 8.6 - 10.5 mg/dL Final      PO4 No results found for: CAPO4   Albumin Albumin   Date Value Ref Range Status   11/06/2019 3.60 3.50 - 5.20 g/dL Final      Magnesium Magnesium   Date Value Ref Range Status   11/07/2019 2.1 1.6 - 2.4 mg/dL Final   11/06/2019 2.0 1.6 - 2.4 mg/dL Final      Uric Acid No results found for: URICACID         Assessment/Plan       Acute on chronic diastolic congestive heart failure (CMS/[facility])    CKD (chronic kidney disease) stage 3, GFR 30-59 ml/min (CMS/[facility])    Severe obesity (BMI 35.0-35.9 with comorbidity) (CMS/[facility])    Type 2 diabetes mellitus with diabetic polyneuropathy, with long-term current use of insulin (CMS/[facility])    COPD (chronic obstructive pulmonary disease) (CMS/[facility])    Essential hypertension    Coronary artery disease involving native coronary artery of native heart with angina pectoris (CMS/[facility])    JC (obstructive sleep apnea)    Hyperlipidemia LDL goal <70    Elevated troponin      Assessment:    Condition: In stable condition.  Improving.       Plan:   (AKILA on CKD vs CKD likely has CRS with passive renal venous congestin. Clearly at this poin> patient needs aggressive diuretics. Will monitor renal function. Generally patients with CRS do better overall in terms 90 days outcome with aggressive diuresis despite acute changes in serum cr  - Continue bumex 1 mg BID IV  - Strict I/O  - Net negative fluid balance -1, 1.5 liter  - Daily weight   - Low Na diet    - Monitor renal function. ).         #CKD Stage III  #Volume overload   #CHF   #Anemia   I discussed the patients findings and my recommendations with patient    Praneeth Vann MD  11/07/19  4:26 PM

## 2019-11-07 NOTE — PLAN OF CARE
Problem: Patient Care Overview  Goal: Plan of Care Review   11/07/19 0229   Coping/Psychosocial   Plan of Care Reviewed With patient   Plan of Care Review   Progress no change   OTHER   Outcome Summary uneventful evening; pt seems to be resting well.

## 2019-11-08 LAB
ALBUMIN SERPL-MCNC: 3.4 G/DL (ref 3.5–5.2)
ANION GAP SERPL CALCULATED.3IONS-SCNC: 11 MMOL/L (ref 5–15)
BUN BLD-MCNC: 33 MG/DL (ref 8–23)
BUN/CREAT SERPL: 16.4 (ref 7–25)
CALCIUM SPEC-SCNC: 9 MG/DL (ref 8.6–10.5)
CHLORIDE SERPL-SCNC: 100 MMOL/L (ref 98–107)
CHOLEST SERPL-MCNC: 122 MG/DL (ref 0–200)
CO2 SERPL-SCNC: 26 MMOL/L (ref 22–29)
CREAT BLD-MCNC: 2.01 MG/DL (ref 0.76–1.27)
DEPRECATED RDW RBC AUTO: 51.2 FL (ref 37–54)
ERYTHROCYTE [DISTWIDTH] IN BLOOD BY AUTOMATED COUNT: 14.5 % (ref 12.3–15.4)
FOLATE SERPL-MCNC: >20 NG/ML (ref 4.78–24.2)
GFR SERPL CREATININE-BSD FRML MDRD: 32 ML/MIN/1.73
GLUCOSE BLD-MCNC: 138 MG/DL (ref 65–99)
GLUCOSE BLDC GLUCOMTR-MCNC: 137 MG/DL (ref 70–130)
GLUCOSE BLDC GLUCOMTR-MCNC: 139 MG/DL (ref 70–130)
GLUCOSE BLDC GLUCOMTR-MCNC: 185 MG/DL (ref 70–130)
GLUCOSE BLDC GLUCOMTR-MCNC: 203 MG/DL (ref 70–130)
HCT VFR BLD AUTO: 35 % (ref 37.5–51)
HDLC SERPL-MCNC: 33 MG/DL (ref 40–60)
HGB BLD-MCNC: 11.2 G/DL (ref 13–17.7)
LDLC SERPL CALC-MCNC: 59 MG/DL (ref 0–100)
LDLC/HDLC SERPL: 1.78 {RATIO}
MAGNESIUM SERPL-MCNC: 2.1 MG/DL (ref 1.6–2.4)
MCH RBC QN AUTO: 30.9 PG (ref 26.6–33)
MCHC RBC AUTO-ENTMCNC: 32 G/DL (ref 31.5–35.7)
MCV RBC AUTO: 96.4 FL (ref 79–97)
PHOSPHATE SERPL-MCNC: 3.9 MG/DL (ref 2.5–4.5)
PLATELET # BLD AUTO: 155 10*3/MM3 (ref 140–450)
PMV BLD AUTO: 9.4 FL (ref 6–12)
POTASSIUM BLD-SCNC: 4.2 MMOL/L (ref 3.5–5.2)
RBC # BLD AUTO: 3.63 10*6/MM3 (ref 4.14–5.8)
SODIUM BLD-SCNC: 137 MMOL/L (ref 136–145)
TRIGL SERPL-MCNC: 152 MG/DL (ref 0–150)
VIT B12 BLD-MCNC: 734 PG/ML (ref 211–946)
VLDLC SERPL-MCNC: 30.4 MG/DL
WBC NRBC COR # BLD: 5.89 10*3/MM3 (ref 3.4–10.8)

## 2019-11-08 PROCEDURE — 97161 PT EVAL LOW COMPLEX 20 MIN: CPT

## 2019-11-08 PROCEDURE — 83735 ASSAY OF MAGNESIUM: CPT | Performed by: FAMILY MEDICINE

## 2019-11-08 PROCEDURE — 80061 LIPID PANEL: CPT | Performed by: FAMILY MEDICINE

## 2019-11-08 PROCEDURE — 82746 ASSAY OF FOLIC ACID SERUM: CPT | Performed by: FAMILY MEDICINE

## 2019-11-08 PROCEDURE — 25010000002 HEPARIN (PORCINE) PER 1000 UNITS: Performed by: INTERNAL MEDICINE

## 2019-11-08 PROCEDURE — 63710000001 INSULIN DETEMIR PER 5 UNITS: Performed by: INTERNAL MEDICINE

## 2019-11-08 PROCEDURE — 80069 RENAL FUNCTION PANEL: CPT | Performed by: FAMILY MEDICINE

## 2019-11-08 PROCEDURE — 82962 GLUCOSE BLOOD TEST: CPT

## 2019-11-08 PROCEDURE — 99232 SBSQ HOSP IP/OBS MODERATE 35: CPT | Performed by: FAMILY MEDICINE

## 2019-11-08 PROCEDURE — 82607 VITAMIN B-12: CPT | Performed by: FAMILY MEDICINE

## 2019-11-08 PROCEDURE — 85027 COMPLETE CBC AUTOMATED: CPT | Performed by: FAMILY MEDICINE

## 2019-11-08 RX ORDER — GLIPIZIDE 5 MG/1
5 TABLET ORAL DAILY
COMMUNITY

## 2019-11-08 RX ADMIN — FAMOTIDINE 20 MG: 20 TABLET ORAL at 17:32

## 2019-11-08 RX ADMIN — FERROUS SULFATE TAB 325 MG (65 MG ELEMENTAL FE) 325 MG: 325 (65 FE) TAB at 09:25

## 2019-11-08 RX ADMIN — BUMETANIDE 1 MG: 0.25 INJECTION INTRAMUSCULAR; INTRAVENOUS at 09:25

## 2019-11-08 RX ADMIN — SODIUM CHLORIDE, PRESERVATIVE FREE 10 ML: 5 INJECTION INTRAVENOUS at 09:23

## 2019-11-08 RX ADMIN — HEPARIN SODIUM 5000 UNITS: 5000 INJECTION, SOLUTION INTRAVENOUS; SUBCUTANEOUS at 20:42

## 2019-11-08 RX ADMIN — INSULIN DETEMIR 14 UNITS: 100 INJECTION, SOLUTION SUBCUTANEOUS at 20:43

## 2019-11-08 RX ADMIN — SODIUM CHLORIDE, PRESERVATIVE FREE 10 ML: 5 INJECTION INTRAVENOUS at 20:48

## 2019-11-08 RX ADMIN — FLUTICASONE PROPIONATE 2 SPRAY: 50 SPRAY, METERED NASAL at 09:23

## 2019-11-08 RX ADMIN — HEPARIN SODIUM 5000 UNITS: 5000 INJECTION, SOLUTION INTRAVENOUS; SUBCUTANEOUS at 09:23

## 2019-11-08 RX ADMIN — TERAZOSIN HYDROCHLORIDE ANHYDROUS 2 MG: 2 CAPSULE ORAL at 20:42

## 2019-11-08 RX ADMIN — INSULIN LISPRO 2 UNITS: 100 INJECTION, SOLUTION INTRAVENOUS; SUBCUTANEOUS at 20:43

## 2019-11-08 RX ADMIN — CETIRIZINE HYDROCHLORIDE 5 MG: 10 TABLET, FILM COATED ORAL at 09:25

## 2019-11-08 RX ADMIN — FAMOTIDINE 20 MG: 20 TABLET ORAL at 09:24

## 2019-11-08 RX ADMIN — NYSTATIN: 100000 POWDER TOPICAL at 17:32

## 2019-11-08 RX ADMIN — NYSTATIN: 100000 POWDER TOPICAL at 20:43

## 2019-11-08 RX ADMIN — METOPROLOL SUCCINATE 25 MG: 25 TABLET, EXTENDED RELEASE ORAL at 09:24

## 2019-11-08 RX ADMIN — ATORVASTATIN CALCIUM 10 MG: 10 TABLET, FILM COATED ORAL at 20:42

## 2019-11-08 RX ADMIN — CITALOPRAM HYDROBROMIDE 10 MG: 20 TABLET ORAL at 09:24

## 2019-11-08 RX ADMIN — CYANOCOBALAMIN TAB 1000 MCG 1000 MCG: 1000 TAB at 09:25

## 2019-11-08 RX ADMIN — METOPROLOL SUCCINATE 25 MG: 25 TABLET, EXTENDED RELEASE ORAL at 20:42

## 2019-11-08 RX ADMIN — ASPIRIN 325 MG ORAL TABLET 162 MG: 325 PILL ORAL at 09:23

## 2019-11-08 RX ADMIN — INSULIN LISPRO 4 UNITS: 100 INJECTION, SOLUTION INTRAVENOUS; SUBCUTANEOUS at 13:06

## 2019-11-08 NOTE — PROGRESS NOTES
Continued Stay Note  [facility]     Patient Name: Lico Diego  MRN: [id number]  Today's Date: 11/8/2019    Admit Date: 11/6/2019    Discharge Plan     Row Name 11/08/19 1505       Plan    Plan  TBD    Plan Comments  Spoke with pt. and family at bedside. Plan currently is to dc to home, but will await eval. by PT/OT. Will cont. to follow and update as needed.     Final Discharge Disposition Code  01 - home or self-care        Discharge Codes    No documentation.             Ana Cedeño RN

## 2019-11-08 NOTE — PROGRESS NOTES
[facility] Medicine Services  PROGRESS NOTE    Patient Name: Lico Digeo  : 1935  MRN: [id number]    Date of Admission: 2019  Primary Care Physician: Khai Menjivar DO    Subjective   Subjective     CC:  F/U CHF exacerbation    HPI:  Patient seen and examined.  Nursing notes reviewed.  No acute events overnight.  Daughter present at bedside this morning.  Patient not requiring any supplemental oxygen.  Lower extremity swelling much improved. Patient denies any chest pain.  Shortness of breath improved.    Review of Systems  Gen- No fevers, chills  CV- No chest pain, palpitations  Resp- No cough, +dyspnea (improved)  GI- No N/V/D, abd pain        Objective   Objective     Vital Signs:   Temp:  [97.9 °F (36.6 °C)-98 °F (36.7 °C)] 98 °F (36.7 °C)  Heart Rate:  [72-87] 83  Resp:  [18-20] 20  BP: (128-163)/(57-78) 135/78        Physical Exam:  Constitutional: No acute distress, awake, alert  HENT: NCAT, mucous membranes moist  Respiratory: Bibasilar rales improved, respiratory effort normal   Cardiovascular: RRR, no murmurs, rubs, or gallops, palpable pedal pulses bilaterally  Gastrointestinal: Positive bowel sounds, soft, nontender, nondistended  Musculoskeletal: B/L LE trace edema  Psychiatric: Appropriate affect, cooperative  Neurologic: Oriented x 3, strength symmetric in all extremities, Cranial Nerves grossly intact to confrontation, speech clear  Skin: No rashes    Results Reviewed:    Results from last 7 days   Lab Units 19  0617 19  1350   WBC 10*3/mm3 5.89 5.35   HEMOGLOBIN g/dL 11.2* 11.2*   HEMATOCRIT % 35.0* 37.3*   PLATELETS 10*3/mm3 155 167   INR   --  1.15     Results from last 7 days   Lab Units 19  0617 19  1159 19  0536 19  1350   SODIUM mmol/L 137  --  138 138   POTASSIUM mmol/L 4.2  --  4.5 4.8   CHLORIDE mmol/L 100  --  102 102   CO2 mmol/L 26.0  --  24.0 24.0   BUN mg/dL 33*  --  32* 28*   CREATININE mg/dL 2.01*   --  1.85* 1.68*   GLUCOSE mg/dL 138*  --  137* 218*   CALCIUM mg/dL 9.0  --  8.8 8.8   ALT (SGPT) U/L  --   --   --  10   AST (SGOT) U/L  --   --   --  19   TROPONIN T ng/mL  --  0.053*  --  0.041*   PROBNP pg/mL  --   --   --  1,764.0     Estimated Creatinine Clearance: 37.7 mL/min (A) (by C-G formula based on SCr of 2.01 mg/dL (H)).    Microbiology Results Abnormal     None          Imaging Results (Last 24 Hours)     ** No results found for the last 24 hours. **          Results for orders placed during the hospital encounter of 11/06/19   Adult Transthoracic Echo Complete W/ Cont if Necessary Per Protocol    Narrative · Left ventricular systolic function is low normal. Estimated EF = 47%.  · Left ventricular wall thickness is consistent with mild septal   asymmetric hypertrophy.  · Left atrial cavity size is moderately dilated.  · Right ventricular cavity is mildly dilated.          I have reviewed the medications:  Scheduled Meds:    aspirin 162 mg Oral Daily   atorvastatin 10 mg Oral Nightly   budesonide-formoterol 2 puff Inhalation Daily - RT   cetirizine 5 mg Oral Daily   citalopram 10 mg Oral Daily   famotidine 20 mg Oral BID AC   ferrous sulfate 325 mg Oral Daily With Breakfast   fluticasone 2 spray Each Nare Daily   heparin (porcine) 5,000 Units Subcutaneous Q12H   insulin detemir 14 Units Subcutaneous Nightly   insulin lispro 0-9 Units Subcutaneous 4x Daily With Meals & Nightly   metoprolol succinate XL 25 mg Oral BID   nystatin  Topical Q12H   pharmacy consult - MTM  Does not apply Daily   sodium chloride 10 mL Intravenous Q12H   terazosin 2 mg Oral Nightly   vitamin B-12 1,000 mcg Oral Daily     Continuous Infusions:   PRN Meds:.dextrose  •  dextrose  •  glucagon (human recombinant)  •  ipratropium-albuterol  •  ipratropium-albuterol  •  nitroglycerin  •  sennosides-docusate  •  sodium chloride      Assessment/Plan   Assessment / Plan     Active Hospital Problems    Diagnosis  POA   • **Acute on  chronic diastolic congestive heart failure (CMS/[facility]) [I50.33]  Yes   • Elevated troponin [R79.89]  Yes   • Hyperlipidemia LDL goal <70 [E78.5]  Yes   • JC (obstructive sleep apnea) [G47.33]  Yes   • Coronary artery disease involving native coronary artery of native heart with angina pectoris (CMS/[facility]) [I25.119]  Yes   • Severe obesity (BMI 35.0-35.9 with comorbidity) (CMS/HCC) [E66.01, Z68.35]  Not Applicable   • Type 2 diabetes mellitus with diabetic polyneuropathy, with long-term current use of insulin (CMS/HCC) [E11.42, Z79.4]  Not Applicable   • COPD (chronic obstructive pulmonary disease) (CMS/[facility]) [J44.9]  Yes   • Essential hypertension [I10]  Yes   • CKD (chronic kidney disease) stage 3, GFR 30-59 ml/min (CMS/[facility]) [N18.3]  Yes      Resolved Hospital Problems    Diagnosis Date Resolved POA   • Hypoxia [R09.02] 11/06/2019 Yes        Brief Hospital Course to date:  Lico Diego is a 84 y.o. male with PMHx of CHF, sent from Nephrology clinic due to weight gain and SOA.    AE CHF with reduced EF  CAD  Ischemic Cardiomyopathy  Elevated Troponin  -Continue Bumex 1mg IV BID, Nephrology managing   -Strict I's/O's, daily weights (pt in negative fluid balance, has lost 10 pounds since admission)  -2g Sodium diet  -Continue Metoprolol Succinate   -No ACE/ARB due to CKD  -Echo reviewed, results discussed with patient, EF 47%    DM2  -Levemir 14 units at night  -Moderate dose SSI  -Glucoscans ACHS  -a1c 5.9    AKILA on CKD3  -Nephrology consulted, appreciate recs  -Creatinine further elevated, per Nephrology, likely has CRS  -Pt on Bumex, will monitor closely  -Pt in negative fluid balance    HLD  -continue statin therapy  -FLP reviewed, results discussed    COPD  -DuoNebs prn  -Home inhalers continued  -No supplemental O2    HTN  -BP stable    JC  -CPAP at night    DVT Prophylaxis:  Subq Heparin    Disposition: I expect the patient to be discharged home once medically stable. PT/OT consulted today.      CODE STATUS:    Code Status and Medical Interventions:   Ordered at: 11/06/19 1539     Code Status:    CPR     Medical Interventions (Level of Support Prior to Arrest):    Full         Electronically signed by Tracie Sharma DO, 11/08/19, 12:50 PM.

## 2019-11-08 NOTE — PLAN OF CARE
Problem: Patient Care Overview  Goal: Plan of Care Review  Outcome: Ongoing (interventions implemented as appropriate)   11/08/19 1500   Coping/Psychosocial   Plan of Care Reviewed With patient   OTHER   Outcome Summary PT eval is completed patient is able to ambulate 50 ft with walker and CGA he has slight drop in O2 sats to 87% ambulating on room air but has a quick recovery with 1 min of sitting. anticipate patient to go home with family assist at D/C

## 2019-11-08 NOTE — PLAN OF CARE
Problem: Patient Care Overview  Goal: Plan of Care Review  Outcome: Ongoing (interventions implemented as appropriate)   11/08/19 0124   Coping/Psychosocial   Plan of Care Reviewed With patient   Plan of Care Review   Progress no change   OTHER   Outcome Summary pt has had uneventful night; has rested well

## 2019-11-08 NOTE — NURSING NOTE
Patient Name:  Lico Diego  :  1935  DOS:  19    Active Hospital Problems    Diagnosis   • **Acute on chronic diastolic congestive heart failure (CMS/[facility])     · Echo (2019): LVEF 45%.  Mild MR.  Moderate TR.  RVSP 64 mmHg  · Echo (2019): LVEF 47%. Mild septal asymmetric hypertrophy.     • Elevated troponin     · Mild and flat troponin elevation in the setting of chronic renal insufficiency not concerning for acute coronary syndrome     • Hyperlipidemia LDL goal <70   • JC (obstructive sleep apnea)   • Coronary artery disease involving native coronary artery of native heart with angina pectoris (CMS/[facility])     · CABG (): LIMA to LAD, SVG to circumflex, SVG to RPDA, SVG to diagonal. Normal LVEF  · Cardiac cath (): 3/4 grafts patent. Normal LVEF.  Medical management  · PET stress (): Inferior infarct with ischemia. Medical management recommended     • Severe obesity (BMI 35.0-35.9 with comorbidity) (CMS/[facility])   • Type 2 diabetes mellitus with diabetic polyneuropathy, with long-term current use of insulin (CMS/[facility])   • COPD (chronic obstructive pulmonary disease) (CMS/[facility])   • Essential hypertension   • CKD (chronic kidney disease) stage 3, GFR 30-59 ml/min (CMS/[facility])       Consult has been received.  Medical records have been reviewed.  Patient with chronic diastolic heart failure with LVEF 45%, coronary disease status post CABG CKD stage III admitted with acute on chronic CHF with advancing chronic kidney disease.  Patient is a good candidate for the [facility].  Education provided.  Education time 10min.  Patient to be scheduled follow up 1wk post discharge.    Echo Results:  · Left ventricular systolic function is low normal. Estimated EF = 47%.  · Left ventricular wall thickness is consistent with mild septal asymmetric hypertrophy.  · Left atrial cavity size is moderately dilated.  · Right ventricular cavity is mildly dilated.       NYHA Class:III    Heart  Failure Quality Measures    ACE I, ARB, ARNI (if EF <40%)     If no contraindications:  AKILA / CKD / Renal Stenosis    Evidence-based Beta Blocker (EF<40%)    (Bisoprolol, Carvedilol, Metoprolol Succinate)  Yes  Metoprolol succinate    Aldosterone Antagonist (EF <40%)  If no contraindications:  Hypotension    Heart Failure Education    Medications management and adherance, Low Na diet, Signs / symptoms, Fluid restriction: 1.5, Daily weight monitoring and Role of [facility] and when to call

## 2019-11-08 NOTE — THERAPY EVALUATION
Patient Name: Lico iDego  : 1935    MRN: [id number]                              Today's Date: 2019       Admit Date: 2019    Visit Dx: No diagnosis found.  Patient Active Problem List   Diagnosis   • Fall at home   • CKD (chronic kidney disease) stage 3, GFR 30-59 ml/min (CMS/[facility])   • Diarrhea of presumed infectious origin   • Bilious vomiting with nausea   • Ischemic cardiomyopathy   • Acute on chronic diastolic congestive heart failure (CMS/[facility])   • Severe obesity (BMI 35.0-35.9 with comorbidity) (CMS/[facility])   • Type 2 diabetes mellitus with diabetic polyneuropathy, with long-term current use of insulin (CMS/[facility])   • COPD (chronic obstructive pulmonary disease) (CMS/[facility])   • Essential hypertension   • Bimalleolar ankle fracture, right, closed, initial encounter   • Syndesmotic disruption of right ankle   • Coronary artery disease involving native coronary artery of native heart with angina pectoris (CMS/[facility])   • JC (obstructive sleep apnea)   • Premature ventricular contractions (PVCs) (VPCs)   • Hyperlipidemia LDL goal <70   • Urinary retention   • Elevated troponin     Past Medical History:   Diagnosis Date   • Basal cell carcinoma    • CKD (chronic kidney disease)    • COPD (chronic obstructive pulmonary disease) (CMS/[facility])    • Coronary artery disease    • Diabetes mellitus (CMS/[facility])    • Ejection fraction < 50%     EF 35%   • H/O seasonal allergies    • Hypertension    • Myocardial infarction (CMS/[facility])    • JC (obstructive sleep apnea)     cpap   • Prostate cancer (CMS/[facility])     RADIATION TREATMENTS     Past Surgical History:   Procedure Laterality Date   • ANKLE OPEN REDUCTION INTERNAL FIXATION Right 2019    Procedure: OPEN REDUCTION INTERNAL FIXATION RIGHT LATERAL MALLEOLUS FRACTURE AND SYNDESMOSIS;  Surgeon: Ken Abreu MD;  Location: [facility];  Service: Orthopedics   • CORONARY ARTERY BYPASS GRAFT     • SKIN BIOPSY       General Information     Row Name 19  1530          PT Evaluation Time/Intention    Document Type  evaluation  -FABRICIO     Mode of Treatment  physical therapy  -FABRICIO     Row Name 11/08/19 1530          General Information    Patient Profile Reviewed?  yes  -FABRICIO     Prior Level of Function  independent:;gait;transfer;bed mobility;ADL's  -FABRICIO     Existing Precautions/Restrictions  no known precautions/restrictions  -FABRICIO     Barriers to Rehab  previous functional deficit  -FABRICIO     Row Name 11/08/19 1530          Relationship/Environment    Lives With  alone  -FABRICIO     Row Name 11/08/19 1530          Resource/Environmental Concerns    Current Living Arrangements  home/apartment/condo  -FABRICIO     Row Name 11/08/19 1530          Home Main Entrance    Number of Stairs, Main Entrance  none  -FABRICIO     Row Name 11/08/19 1530          Cognitive Assessment/Intervention- PT/OT    Orientation Status (Cognition)  oriented x 4  -FABRICIO     Row Name 11/08/19 1530          Safety Issues, Functional Mobility    Safety Issues Affecting Function (Mobility)  safety precautions follow-through/compliance;safety precaution awareness  -FABRICIO       User Key  (r) = Recorded By, (t) = Taken By, (c) = Cosigned By    Initials Name Provider Type    FABRICIO Karime Edge, PT Physical Therapist        Mobility     Row Name 11/08/19 1538          Bed Mobility Assessment/Treatment    Bed Mobility Assessment/Treatment  rolling left;rolling right;scooting/bridging;supine-sit;sit-supine  -FABRICIO     Rolling Left [place] (Bed Mobility)  independent  -FABRICIO     Rolling Right [place] (Bed Mobility)  independent  -FABRICIO     Scooting/Bridging [place] (Bed Mobility)  independent  -FABRICIO     Supine-Sit [place] (Bed Mobility)  independent  -FABRICIO     Sit-Supine [place] (Bed Mobility)  independent  -FABRICIO     Assistive Device (Bed Mobility)  bed rails  -FABRICIO     Row Name 11/08/19 1538          Bed-Chair Transfer    Bed-Chair [place] (Transfers)  contact guard  -FABRICIO     Assistive Device (Bed-Chair Transfers)  phil  front-wheeled  -FABRICIO     Row Name 11/08/19 1538          Sit-Stand Transfer    Sit-Stand [place] (Transfers)  contact guard  -FABRICIO     Assistive Device (Sit-Stand Transfers)  walker, front-wheeled  -FABRICIO     Row Name 11/08/19 1538          Gait/Stairs Assessment/Training    Gait/Stairs Assessment/Training  gait/ambulation independence  -FABRICIO     [place] Level (Gait)  contact guard  -FABRICIO     Assistive Device (Gait)  walker, front-wheeled  -FABRICIO     Pattern (Gait)  step-through  -FABRICIO     Left Sided Gait Deviations  foot drop/toe drag patient has brace for shoe  -FABRICIO     Comment (Gait/Stairs)  patient normally gets around at home in W/C He is able to ambulate 50 ft with walker small drop in O2 sats with activity  -FABRICIO       User Key  (r) = Recorded By, (t) = Taken By, (c) = Cosigned By    Initials Name Provider Type    Karime Meoller, PT Physical Therapist        Obj/Interventions     Row Name 11/08/19 1540          General ROM    GENERAL ROM COMMENTS  no ROM deficits  -FABRICIO     Row Name 11/08/19 1540          MMT (Manual Muscle Testing)    General MMT Comments  generalized weakness 4/5  -FABRICIO     Row Name 11/08/19 1540          Therapeutic Exercise    Lower Extremity Range of Motion (Therapeutic Exercise)  hip flexion/extension, bilateral;knee flexion/extension, bilateral;ankle dorsiflexion/plantar flexion, bilateral  -FABRICIO     Exercise Type (Therapeutic Exercise)  AROM (active range of motion)  -FABRICIO     Position (Therapeutic Exercise)  seated  -FABRICIO     Sets/Reps (Therapeutic Exercise)  1/10  -FABRICIO     Expected Outcome (Therapeutic Exercise)  improve functional stability;improve functional tolerance, self-care activity;facilitate normal movement patterns  -FABRICIO     Row Name 11/08/19 1540          Static Sitting Balance    Level of [place] (Unsupported Sitting, Static Balance)  independent  -FABRICIO     Sitting Position (Unsupported Sitting, Static Balance)  sitting on edge of bed  -FABRICIO     Time Able to Maintain Position  (Unsupported Sitting, Static Balance)  more than 5 minutes  -FABRICIO     Row Name 11/08/19 1540          Dynamic Sitting Balance    Level of [place], Reaches Outside Midline (Sitting, Dynamic Balance)  independent  -FABRICIO     Sitting Position, Reaches Outside Midline (Sitting, Dynamic Balance)  sitting on edge of bed  -FABRICIO     Row Name 11/08/19 1540          Static Standing Balance    Level of [place] (Supported Standing, Static Balance)  contact guard assist  -FABRICIO     Time Able to Maintain Position (Supported Standing, Static Balance)  1 to 2 minutes  -FABRICIO     Assistive Device Utilized (Supported Standing, Static Balance)  walker, rolling  -FABRICIO     Row Name 11/08/19 1540          Dynamic Standing Balance    Level of [place], Reaches Outside Midline (Standing, Dynamic Balance)  contact guard assist  -FABRICIO     Time Able to Maintain Position, Reaches Outside Midline (Standing, Dynamic Balance)  4 to 5 minutes  -FABRICIO     Assistive Device Utilized (Supported Standing, Dynamic Balance)  walker, rolling  -FABRICIO       User Key  (r) = Recorded By, (t) = Taken By, (c) = Cosigned By    Initials Name Provider Type    FABRICIO Karime Edge A, PT Physical Therapist        Goals/Plan     Row Name 11/08/19 1543          Bed Mobility Goal 1 (PT)    Activity/Assistive Device (Bed Mobility Goal 1, PT)  sit to supine/supine to sit  -FABRICIO     [place] Level/Cues Needed (Bed Mobility Goal 1, PT)  independent  -FABRICIO     Time Frame (Bed Mobility Goal 1, PT)  1 day  -FABRICIO     Progress/Outcomes (Bed Mobility Goal 1, PT)  goal met  -     Row Name 11/08/19 1543          Transfer Goal 1 (PT)    Activity/Assistive Device (Transfer Goal 1, PT)  sit-to-stand/stand-to-sit  -FABRICIO     [place] Level/Cues Needed (Transfer Goal 1, PT)  independent  -FABRICIO     Time Frame (Transfer Goal 1, PT)  long term goal (LTG);10 days  -FABRICIO     Progress/Outcome (Transfer Goal 1, PT)  goal ongoing  -     Row Name 11/08/19 1543          Gait Training Goal 1 (PT)     Activity/Assistive Device (Gait Training Goal 1, PT)  gait (walking locomotion);walker, rolling  -FABRICIO     [place] Level (Gait Training Goal 1, PT)  independent  -FABRICIO     Distance (Gait Goal 1, PT)  150  -FABRICIO     Time Frame (Gait Training Goal 1, PT)  long term goal (LTG);10 days  -FABRICIO     Progress/Outcome (Gait Training Goal 1, PT)  goal ongoing  -FABRICIO     Row Name 11/08/19 1543          Patient Education Goal (PT)    Activity (Patient Education Goal, PT)  HEP  -FABRICIO     [place]/Cues/Accuracy (Memory Goal 2, PT)  verbalizes understanding  -FABRICIO     Time Frame (Patient Education Goal, PT)  long term goal (LTG);10 days  -FABRICIO     Progress/Outcome (Patient Education Goal, PT)  goal ongoing  -FABRICIO       User Key  (r) = Recorded By, (t) = Taken By, (c) = Cosigned By    Initials Name Provider Type    Karime Moeller, PT Physical Therapist        Clinical Impression     Row Name 11/08/19 1541          Pain Assessment    Additional Documentation  Pain Scale: Numbers Pre/Post-Treatment (Group)  -FABRICIO     Row Name 11/08/19 1541          Pain Scale: Numbers Pre/Post-Treatment    Pain Scale: Numbers, Pretreatment  0/10 - no pain  -FABRICIO     Pain Scale: Numbers, Post-Treatment  0/10 - no pain  -FABRICIO     Row Name 11/08/19 1541          Plan of Care Review    Plan of Care Reviewed With  patient  -FABRICIO     Row Name 11/08/19 1541          Physical Therapy Clinical Impression    Patient/Family Goals Statement (PT Clinical Impression)  patient to go home alone. daughters to check in on him  -FABRICIO     Criteria for Skilled Interventions Met (PT Clinical Impression)  yes;treatment indicated  -FABRICIO     Rehab Potential (PT Clinical Summary)  good, to achieve stated therapy goals  -FABRICIO     Row Name 11/08/19 1541          Vital Signs    Pretreatment Heart Rate (beats/min)  76  -FABRICIO     Posttreatment Heart Rate (beats/min)  82  -FABRICIO     Pre SpO2 (%)  94  -FABRICIO     O2 Delivery Pre Treatment  room air  -FABRICIO     Intra SpO2 (%)  87  -FABRICIO     O2 Delivery Intra  Treatment  room air  -FABRICIO     Post SpO2 (%)  92  -FABRICIO     O2 Delivery Post Treatment  room air  -FABRICIO     Pre Patient Position  Supine  -FABRICIO     Intra Patient Position  Standing  -FABRICIO     Post Patient Position  Supine  -FABRICIO     Recovery Time  1 min  -FABRICIO     Row Name 11/08/19 1541          Positioning and Restraints    Pre-Treatment Position  in bed  -FABRICIO     Post Treatment Position  bed  -FABRICIO     In Bed  notified nsg;supine;call light within reach  -FABRICIO       User Key  (r) = Recorded By, (t) = Taken By, (c) = Cosigned By    Initials Name Provider Type    Karime Moeller PT Physical Therapist        Outcome Measures     Row Name 11/08/19 1546          How much help from another person do you currently need...    Turning from your back to your side while in flat bed without using bedrails?  4  -FABRICIO     Moving from lying on back to sitting on the side of a flat bed without bedrails?  4  -FABRICIO     Moving to and from a bed to a chair (including a wheelchair)?  3  -FABRICIO     Standing up from a chair using your arms (e.g., wheelchair, bedside chair)?  3  -FABRICIO     Climbing 3-5 steps with a railing?  3  -FABRICIO     To walk in hospital room?  3  -FABRICIO     AM-PAC 6 Clicks Score (PT)  20  -FABRICIO     Row Name 11/08/19 1546          Functional Assessment    Outcome Measure Options  AM-PAC 6 Clicks Basic Mobility (PT)  -FABRICIO       User Key  (r) = Recorded By, (t) = Taken By, (c) = Cosigned By    Initials Name Provider Type    Karime Moeller PT Physical Therapist        Physical Therapy Education     Title: PT OT SLP Therapies (In Progress)     Topic: Physical Therapy (In Progress)     Point: Mobility training (In Progress)     Learning Progress Summary           Patient Acceptance, E, NR by FABRICIO at 11/8/2019  3:00 PM                   Point: Home exercise program (In Progress)     Learning Progress Summary           Patient Acceptance, E, NR by FABRICIO at 11/8/2019  3:00 PM                   Point: Body mechanics (In Progress)     Learning Progress  Summary           Patient Acceptance, E, NR by FABRICIO at 11/8/2019  3:00 PM                   Point: Precautions (In Progress)     Learning Progress Summary           Patient Acceptance, E, NR by FABRICIO at 11/8/2019  3:00 PM                               User Key     Initials Effective Dates Name Provider Type Discipline    FABRICIO 06/19/15 -  Karime Edge PT Physical Therapist PT              PT Recommendation and Plan  Planned Therapy Interventions (PT Eval): balance training, gait training, home exercise program, strengthening, transfer training  Outcome Summary/Treatment Plan (PT)  Anticipated Equipment Needs at Discharge (PT): front wheeled walker  Anticipated Discharge Disposition (PT): home with assist  Plan of Care Reviewed With: patient  Outcome Summary: PT eval is completed patient is able to ambulate 50 ft with walker and CGA he has slight drop in O2 sats to 87% ambulating on room air but has a quick recovery with 1 min of sitting. anticipate patient to go home with family assist at D/C     Time Calculation:   PT Charges     Row Name 11/08/19 1500             Time Calculation    Start Time  1500  -FABRICIO      PT Received On  11/08/19  -FABRICIO      PT Goal Re-Cert Due Date  11/18/19  -        User Key  (r) = Recorded By, (t) = Taken By, (c) = Cosigned By    Initials Name Provider Type    Karime Moeller PT Physical Therapist        Therapy Charges for Today     Code Description Service Date Service Provider Modifiers Qty    [id number] HC PT EVAL LOW COMPLEXITY 4 11/8/2019 Karime Edge PT GP 1          PT G-Codes  Outcome Measure Options: AM-PAC 6 Clicks Basic Mobility (PT)  AM-PAC 6 Clicks Score (PT): 20    Karime Edge PT  11/8/2019

## 2019-11-08 NOTE — PROGRESS NOTES
" LOS: 2 days   Patient Care Team:  Khai Menjivar DO as PCP - General (Family Medicine)  Ti Santiago MD as Consulting Physician (Interventional Cardiology)    Chief Complaint: Volume overload   CKD     Subjective     Doing well off supplemental o2. SOB improved. UOP 5.5 liter. Cr slightly uptrend in the setting of aggressive diuresis        Subjective:  Symptoms:  No shortness of breath, cough, chest pain or headache.    Diet:  Adequate intake.    Activity level: Normal.    Pain:  He reports no pain.        History taken from: patient    Objective    Physical examination     Constitutional: He is oriented to person, place, and time. He appears well-developed and well-nourished.   HENT:   Head: Normocephalic and atraumatic.   Eyes: EOM are normal. Pupils are equal, round, and reactive to light.   Neck: Normal range of motion. Neck supple.   Cardiovascular: Normal rate, regular rhythm and normal heart sounds.   No murmur heard.  Pulmonary/Chest: Effort normal. He has rales.   Abdominal: Soft. Bowel sounds are normal.   Musculoskeletal: Normal range of motion. He exhibits edema.   Neurological: He is alert and oriented to person, place, and time.   Skin: Skin is warm and dry.   Vital Sign Min/Max for last 24 hours  Temp  Min: 97.9 °F (36.6 °C)  Max: 98 °F (36.7 °C)   BP  Min: 128/62  Max: 163/78   Pulse  Min: 72  Max: 87   Resp  Min: 18  Max: 20   SpO2  Min: 90 %  Max: 94 %   No Data Recorded   Weight  Min: 122 kg (269 lb)  Max: 126 kg (277 lb)     Flowsheet Rows      First Filed Value   Admission Height  182.9 cm (72\") Documented at 11/06/2019 1312   Admission Weight  124 kg (272 lb 9.6 oz) Documented at 11/06/2019 1312          I/O this shift:  In: 360 [P.O.:360]  Out: 525 [Urine:525]  I/O last 3 completed shifts:  In: 600 [P.O.:600]  Out: 5555 [Urine:5555]    Objective    Results Review:     I reviewed the patient's new clinical results.    WBC WBC   Date Value Ref Range Status   11/08/2019 5.89 3.40 - 10.80 " 10*3/mm3 Final   11/06/2019 5.35 3.40 - 10.80 10*3/mm3 Final      HGB Hemoglobin   Date Value Ref Range Status   11/08/2019 11.2 (L) 13.0 - 17.7 g/dL Final   11/06/2019 11.2 (L) 13.0 - 17.7 g/dL Final      HCT Hematocrit   Date Value Ref Range Status   11/08/2019 35.0 (L) 37.5 - 51.0 % Final   11/06/2019 37.3 (L) 37.5 - 51.0 % Final      Platlets No results found for: LABPLAT   MCV MCV   Date Value Ref Range Status   11/08/2019 96.4 79.0 - 97.0 fL Final   11/06/2019 103.9 (H) 79.0 - 97.0 fL Final          Sodium Sodium   Date Value Ref Range Status   11/08/2019 137 136 - 145 mmol/L Final   11/07/2019 138 136 - 145 mmol/L Final   11/06/2019 138 136 - 145 mmol/L Final      Potassium Potassium   Date Value Ref Range Status   11/08/2019 4.2 3.5 - 5.2 mmol/L Final   11/07/2019 4.5 3.5 - 5.2 mmol/L Final   11/06/2019 4.8 3.5 - 5.2 mmol/L Final      Chloride Chloride   Date Value Ref Range Status   11/08/2019 100 98 - 107 mmol/L Final   11/07/2019 102 98 - 107 mmol/L Final   11/06/2019 102 98 - 107 mmol/L Final      CO2 CO2   Date Value Ref Range Status   11/08/2019 26.0 22.0 - 29.0 mmol/L Final   11/07/2019 24.0 22.0 - 29.0 mmol/L Final   11/06/2019 24.0 22.0 - 29.0 mmol/L Final      BUN BUN   Date Value Ref Range Status   11/08/2019 33 (H) 8 - 23 mg/dL Final   11/07/2019 32 (H) 8 - 23 mg/dL Final   11/06/2019 28 (H) 8 - 23 mg/dL Final      Creatinine Creatinine   Date Value Ref Range Status   11/08/2019 2.01 (H) 0.76 - 1.27 mg/dL Final   11/07/2019 1.85 (H) 0.76 - 1.27 mg/dL Final   11/06/2019 1.68 (H) 0.76 - 1.27 mg/dL Final      Calcium Calcium   Date Value Ref Range Status   11/08/2019 9.0 8.6 - 10.5 mg/dL Final   11/07/2019 8.8 8.6 - 10.5 mg/dL Final   11/06/2019 8.8 8.6 - 10.5 mg/dL Final      PO4 No results found for: CAPO4   Albumin Albumin   Date Value Ref Range Status   11/08/2019 3.40 (L) 3.50 - 5.20 g/dL Final   11/06/2019 3.60 3.50 - 5.20 g/dL Final      Magnesium Magnesium   Date Value Ref Range Status    11/08/2019 2.1 1.6 - 2.4 mg/dL Final   11/07/2019 2.1 1.6 - 2.4 mg/dL Final   11/06/2019 2.0 1.6 - 2.4 mg/dL Final      Uric Acid No results found for: URICACID     Medication Review: Yes    Assessment/Plan       Acute on chronic diastolic congestive heart failure (CMS/[facility])    CKD (chronic kidney disease) stage 3, GFR 30-59 ml/min (CMS/[facility])    Severe obesity (BMI 35.0-35.9 with comorbidity) (CMS/[facility])    Type 2 diabetes mellitus with diabetic polyneuropathy, with long-term current use of insulin (CMS/[facility])    COPD (chronic obstructive pulmonary disease) (CMS/[facility])    Essential hypertension    Coronary artery disease involving native coronary artery of native heart with angina pectoris (CMS/[facility])    JC (obstructive sleep apnea)    Hyperlipidemia LDL goal <70    Elevated troponin      Assessment & Plan     #CKD Stage III  #Volume overload   #CHF   #Anemia     Plan:   (AKILA on CKD vs CKD likely has CRS with passive renal venous congestin. Clearly at this poin> patient needs aggressive diuretics. Will monitor renal function. Generally patients with CRS do better overall in terms 90 days outcome with aggressive diuresis despite acute changes in serum cr    - Will hold diuretics for one day to stabilize renal function   - Will start him on low dose Bumex 0.5mg once daily from tomorrow  - Strict I/O  - Daily weight   - Low Na diet    - Monitor renal function.   - If he goes home he will need f/u in 2 weeks and low dose diuretics as metioned        I discussed the patients findings and my recommendations with patient      Praneeth Vann MD  11/08/19  1:30 PM

## 2019-11-08 NOTE — PLAN OF CARE
Problem: Pain, Chronic (Adult)  Goal: Acceptable Pain/Comfort Level and Functional Ability  Outcome: Ongoing (interventions implemented as appropriate)      Problem: Patient Care Overview  Goal: Plan of Care Review  Outcome: Ongoing (interventions implemented as appropriate)   11/08/19 6175   Coping/Psychosocial   Plan of Care Reviewed With patient   Plan of Care Review   Progress improving   OTHER   Outcome Summary PT evaluated patient today, SA02 drops to the 70's with any activity then recovers back to the low 90's at rest. pt had BM today. possible home tomorrow       Problem: Fall Risk (Adult)  Goal: Absence of Fall  Outcome: Ongoing (interventions implemented as appropriate)      Problem: Activity Intolerance (Adult)  Goal: Activity Tolerance  Outcome: Ongoing (interventions implemented as appropriate)      Problem: Skin Injury Risk (Adult)  Goal: Skin Health and Integrity  Outcome: Ongoing (interventions implemented as appropriate)

## 2019-11-09 VITALS
HEART RATE: 83 BPM | SYSTOLIC BLOOD PRESSURE: 164 MMHG | HEIGHT: 71 IN | BODY MASS INDEX: 38.78 KG/M2 | TEMPERATURE: 97.6 F | RESPIRATION RATE: 18 BRPM | DIASTOLIC BLOOD PRESSURE: 88 MMHG | OXYGEN SATURATION: 91 % | WEIGHT: 277 LBS

## 2019-11-09 PROBLEM — I50.33 ACUTE ON CHRONIC DIASTOLIC CONGESTIVE HEART FAILURE (HCC): Status: RESOLVED | Noted: 2019-02-22 | Resolved: 2019-11-09

## 2019-11-09 PROBLEM — R77.8 ELEVATED TROPONIN: Status: RESOLVED | Noted: 2019-11-07 | Resolved: 2019-11-09

## 2019-11-09 LAB
ALBUMIN SERPL-MCNC: 3.6 G/DL (ref 3.5–5.2)
ANION GAP SERPL CALCULATED.3IONS-SCNC: 11 MMOL/L (ref 5–15)
BUN BLD-MCNC: 36 MG/DL (ref 8–23)
BUN/CREAT SERPL: 20 (ref 7–25)
CALCIUM SPEC-SCNC: 9 MG/DL (ref 8.6–10.5)
CHLORIDE SERPL-SCNC: 100 MMOL/L (ref 98–107)
CO2 SERPL-SCNC: 25 MMOL/L (ref 22–29)
CREAT BLD-MCNC: 1.8 MG/DL (ref 0.76–1.27)
DEPRECATED RDW RBC AUTO: 50.1 FL (ref 37–54)
ERYTHROCYTE [DISTWIDTH] IN BLOOD BY AUTOMATED COUNT: 14.3 % (ref 12.3–15.4)
GFR SERPL CREATININE-BSD FRML MDRD: 36 ML/MIN/1.73
GLUCOSE BLD-MCNC: 176 MG/DL (ref 65–99)
GLUCOSE BLDC GLUCOMTR-MCNC: 177 MG/DL (ref 70–130)
HCT VFR BLD AUTO: 35.5 % (ref 37.5–51)
HGB BLD-MCNC: 11.4 G/DL (ref 13–17.7)
MCH RBC QN AUTO: 30.8 PG (ref 26.6–33)
MCHC RBC AUTO-ENTMCNC: 32.1 G/DL (ref 31.5–35.7)
MCV RBC AUTO: 95.9 FL (ref 79–97)
PHOSPHATE SERPL-MCNC: 3.6 MG/DL (ref 2.5–4.5)
PLATELET # BLD AUTO: 164 10*3/MM3 (ref 140–450)
PMV BLD AUTO: 8.9 FL (ref 6–12)
POTASSIUM BLD-SCNC: 4.5 MMOL/L (ref 3.5–5.2)
RBC # BLD AUTO: 3.7 10*6/MM3 (ref 4.14–5.8)
SODIUM BLD-SCNC: 136 MMOL/L (ref 136–145)
WBC NRBC COR # BLD: 7.11 10*3/MM3 (ref 3.4–10.8)

## 2019-11-09 PROCEDURE — 85027 COMPLETE CBC AUTOMATED: CPT | Performed by: FAMILY MEDICINE

## 2019-11-09 PROCEDURE — 80069 RENAL FUNCTION PANEL: CPT | Performed by: FAMILY MEDICINE

## 2019-11-09 PROCEDURE — 94799 UNLISTED PULMONARY SVC/PX: CPT

## 2019-11-09 PROCEDURE — 82962 GLUCOSE BLOOD TEST: CPT

## 2019-11-09 PROCEDURE — 99239 HOSP IP/OBS DSCHRG MGMT >30: CPT | Performed by: FAMILY MEDICINE

## 2019-11-09 PROCEDURE — 25010000002 HEPARIN (PORCINE) PER 1000 UNITS: Performed by: INTERNAL MEDICINE

## 2019-11-09 RX ORDER — POTASSIUM CHLORIDE 750 MG/1
10 TABLET, EXTENDED RELEASE ORAL 3 TIMES WEEKLY
Qty: 15 TABLET | Refills: 0 | Status: SHIPPED | OUTPATIENT
Start: 2019-11-11 | End: 2019-12-03

## 2019-11-09 RX ORDER — BUMETANIDE 0.5 MG/1
0.5 TABLET ORAL DAILY
Qty: 30 TABLET | Refills: 0 | Status: SHIPPED | OUTPATIENT
Start: 2019-11-09 | End: 2019-12-03 | Stop reason: SDUPTHER

## 2019-11-09 RX ORDER — METOPROLOL SUCCINATE 25 MG/1
25 TABLET, EXTENDED RELEASE ORAL 2 TIMES DAILY
Qty: 60 TABLET | Refills: 0 | Status: SHIPPED | OUTPATIENT
Start: 2019-11-09 | End: 2019-12-03 | Stop reason: SDUPTHER

## 2019-11-09 RX ADMIN — INSULIN LISPRO 2 UNITS: 100 INJECTION, SOLUTION INTRAVENOUS; SUBCUTANEOUS at 08:49

## 2019-11-09 RX ADMIN — BUDESONIDE AND FORMOTEROL FUMARATE DIHYDRATE 2 PUFF: 80; 4.5 AEROSOL RESPIRATORY (INHALATION) at 07:57

## 2019-11-09 RX ADMIN — FLUTICASONE PROPIONATE 2 SPRAY: 50 SPRAY, METERED NASAL at 08:48

## 2019-11-09 RX ADMIN — ASPIRIN 325 MG ORAL TABLET 162 MG: 325 PILL ORAL at 08:49

## 2019-11-09 RX ADMIN — SODIUM CHLORIDE, PRESERVATIVE FREE 10 ML: 5 INJECTION INTRAVENOUS at 08:48

## 2019-11-09 RX ADMIN — CETIRIZINE HYDROCHLORIDE 5 MG: 10 TABLET, FILM COATED ORAL at 08:50

## 2019-11-09 RX ADMIN — CYANOCOBALAMIN TAB 1000 MCG 1000 MCG: 1000 TAB at 08:50

## 2019-11-09 RX ADMIN — CITALOPRAM HYDROBROMIDE 10 MG: 20 TABLET ORAL at 08:49

## 2019-11-09 RX ADMIN — FERROUS SULFATE TAB 325 MG (65 MG ELEMENTAL FE) 325 MG: 325 (65 FE) TAB at 08:50

## 2019-11-09 RX ADMIN — FAMOTIDINE 20 MG: 20 TABLET ORAL at 08:49

## 2019-11-09 RX ADMIN — HEPARIN SODIUM 5000 UNITS: 5000 INJECTION, SOLUTION INTRAVENOUS; SUBCUTANEOUS at 08:48

## 2019-11-09 RX ADMIN — METOPROLOL SUCCINATE 25 MG: 25 TABLET, EXTENDED RELEASE ORAL at 08:49

## 2019-11-09 RX ADMIN — NYSTATIN: 100000 POWDER TOPICAL at 08:48

## 2019-11-09 NOTE — PLAN OF CARE
Problem: Patient Care Overview  Goal: Plan of Care Review  Outcome: Ongoing (interventions implemented as appropriate)   11/09/19 0503 11/09/19 0504   Coping/Psychosocial   Plan of Care Reviewed With --  patient   Plan of Care Review   Progress --  no change   OTHER   Outcome Summary Pt had uneventful night; has rested well --

## 2019-11-09 NOTE — DISCHARGE SUMMARY
[facility] Medicine Services  DISCHARGE SUMMARY    Patient Name: Lico Diego  : 1935  MRN: [id number]    Date of Admission: 2019  Date of Discharge:  2019  Primary Care Physician: Khai Menjivar DO    Consults     Date and Time Order Name Status Description    2019 0726 Inpatient Nephrology Consult      2019 0030 Inpatient Cardiology Consult Completed           Hospital Course     Presenting Problem:   Hypoxia [R09.02]    Active Hospital Problems    Diagnosis  POA   • Hyperlipidemia LDL goal <70 [E78.5]  Yes   • JC (obstructive sleep apnea) [G47.33]  Yes   • Coronary artery disease involving native coronary artery of native heart with angina pectoris (CMS/[facility]) [I25.119]  Yes   • Severe obesity (BMI 35.0-35.9 with comorbidity) (CMS/[facility]) [E66.01, Z68.35]  Not Applicable   • Type 2 diabetes mellitus with diabetic polyneuropathy, with long-term current use of insulin (CMS/[facility]) [E11.42, Z79.4]  Not Applicable   • COPD (chronic obstructive pulmonary disease) (CMS/[facility]) [J44.9]  Yes   • Essential hypertension [I10]  Yes   • CKD (chronic kidney disease) stage 3, GFR 30-59 ml/min (CMS/[facility]) [N18.3]  Yes      Resolved Hospital Problems    Diagnosis Date Resolved POA   • **Acute on chronic diastolic congestive heart failure (CMS/[facility]) [I50.33] 2019 Yes   • Elevated troponin [R79.89] 2019 Yes   • Hypoxia [R09.02] 2019 Yes          Hospital Course:  Lico Diego is a 84 y.o. male w/ hx cad, ischemic cardiomyopathy, ckd 3 (baseline cr ~1.7-1.8), insulin dep dm, obesity/jc who was admitted here 2019 with ankle fracture and, after operative repair, developed respiratory failure due to CHF, herbert and was seen by cardiology and nephrology during that hospital stay. In meantime has been following with nephrology in outpatient clinic and has been on lasix 40mg 3 x weekly (m/w/f). Over the past 10 days or so has had 20 lb weight gain, increased leg  edema, dyspnea with exertion. No overt chest pain. No change in chronic sputum. No fever. No chills. At nephrology associates office today patient's oxygen saturations were reportedly 87% on room air, had significant peripheral edema and was directly admitted for aggressive iv diuresis.  Patient was admitted to the medical floor.  He was continued on Bumex 1 mg IV every 12 hours.  Renal function monitored and did rise to 2.0.  Echocardiogram obtained, ejection fraction 47%.  Patient was continued on aspirin, beta-blocker and statin.  Cardiology was consulted to see the patient.  Patient was on a cardiac diet with a 2 g sodium restriction with strict I's and O's and daily weights.  Cardiology stated they wanted nephrology to dictate patient's diuretic regimen.  No ischemic evaluation recommended at this time.  Patient has a follow-up with Sara Gutierrez on 12/3/2019.  Patient was seen in consult by the CHF nurse navigator arterial was given.  Patient was seen in consult by Dr. Vann of nephrology.  Is felt that patient has cardiorenal syndrome.  Mid to continue with Bumex twice daily.  She has lost 10 pounds since admission.  Due to further elevation in creatinine, patient's IV Bumex was held yesterday and he was placed on Bumex 0.5 mg oral daily.  His creatinine is trended back down to 1.8 which appears to be his baseline.  Patient will need to follow-up in the nephrology clinic in 2 weeks.  He will be discharged home on oral Bumex with potassium supplementation 3 days a week to avoid hyperkalemia with patient's chronic kidney disease.  Patient was evaluated with PT and OT.  Okay to go home with family assistance at discharge.  They, patient states he feels well and is ready to go home.  Shortness of breath resolved.  No lower extremity edema.  Acacian changes were discussed with the patient and his daughter at bedside.  Patient will be discharged home in improved and stable condition.      Discharge Follow Up  Recommendations for labs/diagnostics:  Follow-up with PCP Dr. Menjivar in 3-5 days. Repeat BMP at visit.  Follow-up with Dr. Vann, Nephrology, in 2 weeks  Pt has Cardiology follow-up scheduled 12/3/19    Day of Discharge     HPI:   Patient seen and examined.  Nursing notes reviewed.  No acute events overnight.  Patient states he feels well and is ready to go home.  No shortness of breath.    Review of Systems  Gen- No fevers, chills  CV- No chest pain, palpitations  Resp- No cough, dyspnea  GI- No N/V/D, abd pain      Otherwise ROS is negative except as mentioned in the HPI.    Vital Signs:   Temp:  [97.6 °F (36.4 °C)-99.9 °F (37.7 °C)] 97.6 °F (36.4 °C)  Heart Rate:  [68-83] 83  Resp:  [18-20] 18  BP: (142-164)/(60-88) 164/88     Physical Exam:  Constitutional: No acute distress, awake, alert  HENT: NCAT, mucous membranes moist  Respiratory: Clear to auscultation bilaterally, respiratory effort normal   Cardiovascular: RRR, no murmurs, rubs, or gallops, palpable pedal pulses bilaterally  Gastrointestinal: Positive bowel sounds, soft, nontender, nondistended  Musculoskeletal: No bilateral ankle edema  Psychiatric: Appropriate affect, cooperative  Neurologic: Oriented x 3, strength symmetric in all extremities, Cranial Nerves grossly intact to confrontation, speech clear  Skin: No rashes    Pertinent  and/or Most Recent Results     Results from last 7 days   Lab Units 11/09/19  0518 11/08/19  0617 11/07/19  0536 11/06/19  1350   WBC 10*3/mm3 7.11 5.89  --  5.35   HEMOGLOBIN g/dL 11.4* 11.2*  --  11.2*   HEMATOCRIT % 35.5* 35.0*  --  37.3*   PLATELETS 10*3/mm3 164 155  --  167   SODIUM mmol/L 136 137 138 138   POTASSIUM mmol/L 4.5 4.2 4.5 4.8   CHLORIDE mmol/L 100 100 102 102   CO2 mmol/L 25.0 26.0 24.0 24.0   BUN mg/dL 36* 33* 32* 28*   CREATININE mg/dL 1.80* 2.01* 1.85* 1.68*   GLUCOSE mg/dL 176* 138* 137* 218*   CALCIUM mg/dL 9.0 9.0 8.8 8.8     Results from last 7 days   Lab Units 11/06/19  1350   BILIRUBIN mg/dL  0.5   ALK PHOS U/L 47   ALT (SGPT) U/L 10   AST (SGOT) U/L 19   PROTIME Seconds 14.1   INR  1.15     Results from last 7 days   Lab Units 11/08/19  0617   CHOLESTEROL mg/dL 122   TRIGLYCERIDES mg/dL 152*   HDL CHOL mg/dL 33*     Results from last 7 days   Lab Units 11/07/19  1159 11/06/19  1350   HEMOGLOBIN A1C %  --  5.90*   PROBNP pg/mL  --  1,764.0   TROPONIN T ng/mL 0.053* 0.041*       Brief Urine Lab Results  (Last result in the past 365 days)      Color   Clarity   Blood   Leuk Est   Nitrite   Protein   CREAT   Urine HCG        02/28/19 2131 Yellow Clear Negative Negative Negative Negative               Microbiology Results Abnormal     None          Imaging Results (All)     Procedure Component Value Units Date/Time    XR Chest 1 View [[id number]] Collected:  11/06/19 1438     Updated:  11/07/19 0838    Narrative:       EXAMINATION: XR CHEST 1 VW- 11/06/2019     INDICATION: Dyspnea, CHF      COMPARISON: 02/27/2019     FINDINGS: There are postoperative cardiac changes. Vascular congestive  changes have improved when compared with 02/27/2019. There are trace  bilateral pleural effusions. There is no consolidation or mass.           Impression:       Mild congestive changes, improved when compared with  02/27/2019.     D:  11/06/2019  E:  11/06/2019     This report was finalized on 11/7/2019 8:35 AM by Dr. Wilman Dougherty MD.                       Results for orders placed during the hospital encounter of 11/06/19   Adult Transthoracic Echo Complete W/ Cont if Necessary Per Protocol    Narrative · Left ventricular systolic function is low normal. Estimated EF = 47%.  · Left ventricular wall thickness is consistent with mild septal   asymmetric hypertrophy.  · Left atrial cavity size is moderately dilated.  · Right ventricular cavity is mildly dilated.            Discharge Details        Discharge Medications      New Medications      Instructions Start Date   aspirin 81 MG tablet  Replaces:  aspirin 325 MG EC  tablet   81 mg, Oral, Daily      bumetanide 0.5 MG tablet  Commonly known as:  BUMEX   0.5 mg, Oral, Daily      potassium chloride 10 MEQ CR tablet  Commonly known as:  K-DUR,KLOR-CON   10 mEq, Oral, 3 Times Weekly   Start Date:  11/11/2019        Changes to Medications      Instructions Start Date   metoprolol succinate XL 25 MG 24 hr tablet  Commonly known as:  TOPROL-XL  What changed:    · medication strength  · how much to take  · when to take this   25 mg, Oral, 2 Times Daily         Continue These Medications      Instructions Start Date   albuterol (2.5 MG/3ML) 0.083% nebulizer solution  Commonly known as:  PROVENTIL   2.5 mg, Nebulization, Every 8 Hours PRN      albuterol sulfate  (90 Base) MCG/ACT inhaler  Commonly known as:  PROVENTIL HFA;VENTOLIN HFA;PROAIR HFA   1-2 puffs, Inhalation, Every 4 Hours PRN      allopurinol 100 MG tablet  Commonly known as:  ZYLOPRIM   100 mg, Oral, Daily      BREO ELLIPTA IN   1 puff, Inhalation, Daily      citalopram 10 MG tablet  Commonly known as:  CeleXA   10 mg, Oral, Daily      CLARITIN 10 MG capsule  Generic drug:  Loratadine   Oral, Daily      doxazosin 2 MG tablet  Commonly known as:  CARDURA   2 mg, Oral, Nightly      fluticasone 50 MCG/ACT nasal spray  Commonly known as:  FLONASE   2 sprays, Each Nare, Daily      glipiZIDE 2.5 MG half tablet  Commonly known as:  GLUCOTROL   2.5-5 mg, Oral, See Admin Instructions, Take 5mg in the morning and 2.5mg at night       iron polysaccharides 150 MG capsule  Commonly known as:  NIFEREX   150 mg, Oral, Daily      MULTI-VITAMIN DAILY PO   1 tablet, Oral, Daily      nitroglycerin 0.4 MG SL tablet  Commonly known as:  NITROSTAT   0.4 mg, Sublingual, Every 5 Minutes PRN, Take no more than 3 doses in 15 minutes.      TOUJEO MAX SOLOSTAR 300 UNIT/ML solution pen-injector injection  Generic drug:  Insulin Glargine   20 Units, Subcutaneous, Nightly      vitamin B-12 1000 MCG tablet  Commonly known as:  CYANOCOBALAMIN   1,000  mcg, Oral, Daily      ZOCOR 20 MG tablet  Generic drug:  simvastatin   20 mg, Oral, Nightly         Stop These Medications    aspirin 325 MG EC tablet  Replaced by:  aspirin 81 MG tablet     furosemide 20 MG tablet  Commonly known as:  LASIX            No Known Allergies      Discharge Disposition:  Home or Self Care    Diet:  Hospital:  Diet Order   Procedures   • Diet Regular; Cardiac, Consistent Carbohydrate, Low Sodium   2g sodium daily    Discharge Activity:   Activity Instructions     Activity as Tolerated              CODE STATUS:    Code Status and Medical Interventions:   Ordered at: 11/06/19 1539     Code Status:    CPR     Medical Interventions (Level of Support Prior to Arrest):    Full         Future Appointments   Date Time Provider Department Center   12/3/2019  9:20 AM Ysabel Gutierrez APRN [facility] None       Additional Instructions for the Follow-ups that You Need to Schedule     Discharge Follow-up with PCP   As directed       Currently Documented PCP:    Khai Menjivar DO    PCP Phone Number:    [phone]     Follow Up Details:  3-5 days, repeat BMP at appointment         Discharge Follow-up with Specified Provider: Dr. Vann, Nephrology; 2 Weeks   As directed      To:  Dr. Vann, Nephrology    Follow Up:  2 Weeks               Time Spent on Discharge:  50 minutes, 30 minutes spent face to face with patient and daughter counseling, remaining time spent coordinating care.    Electronically signed by Tracie Sharma DO, 11/09/19, 9:58 AM.

## 2019-11-09 NOTE — PROGRESS NOTES
Case Management Discharge Note    Final Note: Plan is for pt. to dc to home with family. Pt. voiced no needs. Family to transport.     Destination      No service has been selected for the patient.      Durable Medical Equipment      No service has been selected for the patient.      Dialysis/Infusion      No service has been selected for the patient.      Home Medical Care      No service has been selected for the patient.      Therapy      No service has been selected for the patient.      Community Resources      No service has been selected for the patient.             Final Discharge Disposition Code: 01 - home or self-care

## 2019-11-10 ENCOUNTER — READMISSION MANAGEMENT (OUTPATIENT)
Dept: CALL CENTER | Facility: HOSPITAL | Age: 84
End: 2019-11-10

## 2019-11-10 NOTE — OUTREACH NOTE
Prep Survey      Responses   Facility patient discharged from?  [place]   Is patient eligible?  Yes   Discharge diagnosis  Acute on chronic diastolic congestive heart failure    Does the patient have one of the following disease processes/diagnoses(primary or secondary)?  CHF   Does the patient have Home health ordered?  No   Is there a DME ordered?  No   Prep survey completed?  Yes          Maggie Ambrose RN

## 2019-11-12 ENCOUNTER — READMISSION MANAGEMENT (OUTPATIENT)
Dept: CALL CENTER | Facility: HOSPITAL | Age: 84
End: 2019-11-12

## 2019-11-12 NOTE — OUTREACH NOTE
CHF Week 1 Survey      Responses   Facility patient discharged from?  [facility]   Does the patient have one of the following disease processes/diagnoses(primary or secondary)?  CHF   Is there a successful TCM telephone encounter documented?  No   CHF Week 1 attempt successful?  Yes   Call start time  1227   Call end time  1232   Discharge diagnosis  Acute on chronic diastolic congestive heart failure    Is patient permission given to speak with other caregiver?  Yes   List who call center can speak with  Lori, jose   Meds reviewed with patient/caregiver?  Yes   Is the patient having any side effects they believe may be caused by any medication additions or changes?  No   Does the patient have all medications ordered at discharge?  Yes   Is the patient taking all medications as directed (includes completed medication regime)?  Yes   Does the patient have a primary care provider?   Yes   Does the patient have an appointment with their PCP within 7 days of discharge?  Yes   Comments regarding PCP  Khai Menjivar DO, Jose Manuelt Thurs 11/14/19   Has the patient kept scheduled appointments due by today?  N/A   Comments  Patient denies need for assistance in getting any needed follow up appts.    Has home health visited the patient within 72 hours of discharge?  N/A   Psychosocial issues?  No   Did the patient receive a copy of their discharge instructions?  Yes   Nursing interventions  Reviewed instructions with patient   What is the patient's perception of their health status since discharge?  Improving   Nursing interventions  Nurse provided patient education   Is the patient weighing daily?  Yes   Does the patient have scales?  Yes   Daily weight interventions  Education provided on importance of daily weight   Is the patient able to teach back signs and symptoms of worsening condition? (i.e. weight gain, shortness of air, etc.)  Yes   Is the patient/caregiver able to teach back the hierarchy of who to call/visit  for symptoms/problems? PCP, Specialist, Home health nurse, Urgent Care, ED, 911  Yes    CHF Week 1 call completed?  Yes          Eleonora Lund RN

## 2019-11-15 ENCOUNTER — OFFICE VISIT (OUTPATIENT)
Dept: CARDIOLOGY | Facility: HOSPITAL | Age: 84
End: 2019-11-15

## 2019-11-15 ENCOUNTER — DOCUMENTATION (OUTPATIENT)
Dept: CARDIOLOGY | Facility: HOSPITAL | Age: 84
End: 2019-11-15

## 2019-11-15 VITALS
BODY MASS INDEX: 36.44 KG/M2 | RESPIRATION RATE: 18 BRPM | OXYGEN SATURATION: 95 % | SYSTOLIC BLOOD PRESSURE: 131 MMHG | TEMPERATURE: 97 F | WEIGHT: 260.25 LBS | DIASTOLIC BLOOD PRESSURE: 60 MMHG | HEIGHT: 71 IN | HEART RATE: 81 BPM

## 2019-11-15 DIAGNOSIS — N18.30 CKD (CHRONIC KIDNEY DISEASE) STAGE 3, GFR 30-59 ML/MIN (HCC): ICD-10-CM

## 2019-11-15 DIAGNOSIS — I10 ESSENTIAL HYPERTENSION: ICD-10-CM

## 2019-11-15 DIAGNOSIS — I50.22 CHRONIC SYSTOLIC CONGESTIVE HEART FAILURE (HCC): Primary | ICD-10-CM

## 2019-11-15 PROCEDURE — 99214 OFFICE O/P EST MOD 30 MIN: CPT | Performed by: NURSE PRACTITIONER

## 2019-11-15 NOTE — PROGRESS NOTES
[facility]  Heart and Valve Center      Encounter Date:11/15/2019     Lico Diego  [address] [address]  [unfilled]    1935    Khai Menjivar DO    Lico Diego is a 84 y.o. male.      Subjective:     Chief Complaint:  Congestive Heart Failure and Establish Care       HPI 84-year-old male presents to the office today for ongoing evaluation of his heart failure with borderline EF.  He was recently hospitalized at [facility] on 11 6/2/2011 9/2019 with hypoxia.  He was followed by both nephrology and cardiology while in the hospital.  He was transitioned to [facility].  It was felt patient's rising creatinine was related to cardiorenal syndrome.  Prior to discharge from the hospital, patient's creatinine was down to 1.8 which is his baseline.  Patient does not have a history of chronic kidney disease stage III and is followed closely by nephrology.  Patient notes he was up 20 pounds prior to to admission to the hospital.  He reports he is feeling better overall.  He is wearing his compression socks daily.  Ambulates with a rolling walker.  Notes mild dyspnea on exertion and fatigue but denies chest pain, dizziness, orthopnea or PND.      Patient Active Problem List   Diagnosis   • Fall at home   • CKD (chronic kidney disease) stage 3, GFR 30-59 ml/min (CMS/[facility])   • Diarrhea of presumed infectious origin   • Bilious vomiting with nausea   • Ischemic cardiomyopathy   • Severe obesity (BMI 35.0-35.9 with comorbidity) (CMS/HCC)   • Type 2 diabetes mellitus with diabetic polyneuropathy, with long-term current use of insulin (CMS/HCC)   • COPD (chronic obstructive pulmonary disease) (CMS/HCC)   • Essential hypertension   • Bimalleolar ankle fracture, right, closed, initial encounter   • Syndesmotic disruption of right ankle   • Coronary artery disease involving native coronary artery of native heart with angina pectoris (CMS/HCC)   • JC (obstructive sleep apnea)   •  Premature ventricular contractions (PVCs) (VPCs)   • Hyperlipidemia LDL goal <70   • Urinary retention       Past Medical History:   Diagnosis Date   • Basal cell carcinoma    • CKD (chronic kidney disease)    • COPD (chronic obstructive pulmonary disease) (CMS/HCC)    • Coronary artery disease    • Diabetes mellitus (CMS/HCC)    • Ejection fraction < 50%     EF 35%   • H/O seasonal allergies    • Hypertension    • Myocardial infarction (CMS/HCC)    • JC (obstructive sleep apnea)     cpap   • Prostate cancer (CMS/HCC)     RADIATION TREATMENTS       Past Surgical History:   Procedure Laterality Date   • ANKLE OPEN REDUCTION INTERNAL FIXATION Right 2/23/2019    Procedure: OPEN REDUCTION INTERNAL FIXATION RIGHT LATERAL MALLEOLUS FRACTURE AND SYNDESMOSIS;  Surgeon: Ken Abreu MD;  Location: [facility];  Service: Orthopedics   • CORONARY ARTERY BYPASS GRAFT     • SKIN BIOPSY         Family History   Problem Relation Age of Onset   • No Known Problems Mother    • No Known Problems Father    • No Known Problems Maternal Grandmother    • Kidney disease Maternal Grandfather        Social History     Socioeconomic History   • Marital status:      Spouse name: Ladi   • Number of children: Not on file   • Years of education: Not on file   • Highest education level: Not on file   Tobacco Use   • Smoking status: Never Smoker   • Smokeless tobacco: Never Used   Substance and Sexual Activity   • Alcohol use: Yes     Alcohol/week: 0.6 oz     Types: 1 Shots of liquor per week     Frequency: Monthly or less   • Drug use: No   • Sexual activity: Defer   Social History Narrative    Lori, daughter with pt today    Caffeine: 1 pot coffee daily       No Known Allergies      Current Outpatient Medications:   •  albuterol (PROVENTIL) (2.5 MG/3ML) 0.083% nebulizer solution, Take 2.5 mg by nebulization Every 8 (Eight) Hours As Needed for Wheezing., Disp: , Rfl:   •  albuterol sulfate  (90 Base) MCG/ACT inhaler,  Inhale 1-2 puffs Every 4 (Four) Hours As Needed for Wheezing., Disp: , Rfl:   •  allopurinol (ZYLOPRIM) 100 MG tablet, Take 100 mg by mouth Daily., Disp: , Rfl:   •  aspirin 81 MG tablet, Take 1 tablet by mouth Daily., Disp: 30 tablet, Rfl: 0  •  bumetanide (BUMEX) 0.5 MG tablet, Take 1 tablet by mouth Daily., Disp: 30 tablet, Rfl: 0  •  citalopram (CeleXA) 10 MG tablet, Take 10 mg by mouth Daily., Disp: , Rfl:   •  doxazosin (CARDURA) 2 MG tablet, Take 2 mg by mouth Every Night., Disp: , Rfl:   •  fluticasone (FLONASE) 50 MCG/ACT nasal spray, 2 sprays by Each Nare route Daily., Disp: , Rfl:   •  Fluticasone Furoate-Vilanterol (BREO ELLIPTA IN), Inhale 1 puff Daily., Disp: , Rfl:   •  glipiZIDE (GLUCOTROL) 2.5 MG half tablet, Take 2.5-5 mg by mouth See Admin Instructions. Take 5mg in the morning and 2.5mg at night, Disp: , Rfl:   •  Insulin Glargine (TOUJEO MAX SOLOSTAR) 300 UNIT/ML solution pen-injector, Inject 20 Units under the skin into the appropriate area as directed Every Night., Disp: , Rfl:   •  iron polysaccharides (NIFEREX) 150 MG capsule, Take 150 mg by mouth Daily., Disp: , Rfl:   •  Loratadine (CLARITIN) 10 MG capsule, Take  by mouth Daily., Disp: , Rfl:   •  metoprolol succinate XL (TOPROL-XL) 25 MG 24 hr tablet, Take 1 tablet by mouth 2 (Two) Times a Day for 30 days., Disp: 60 tablet, Rfl: 0  •  Multiple Vitamin (MULTI-VITAMIN DAILY PO), Take 1 tablet by mouth Daily., Disp: , Rfl:   •  nitroglycerin (NITROSTAT) 0.4 MG SL tablet, Place 1 tablet under the tongue Every 5 (Five) Minutes As Needed for Chest Pain. Take no more than 3 doses in 15 minutes., Disp: 25 tablet, Rfl: 0  •  potassium chloride (K-DUR,KLOR-CON) 10 MEQ CR tablet, Take 1 tablet by mouth 3 (Three) Times a Week., Disp: 15 tablet, Rfl: 0  •  simvastatin (ZOCOR) 20 MG tablet, Take 20 mg by mouth Every Night., Disp: , Rfl:   •  vitamin B-12 (CYANOCOBALAMIN) 1000 MCG tablet, Take 1,000 mcg by mouth Daily., Disp: , Rfl:     The following  portions of the patient's history were reviewed today and updated as appropriate: allergies, current medications, past family history, past medical history, past social history, past surgical history and problem list     Review of Systems   Constitution: Positive for malaise/fatigue. Negative for chills, decreased appetite, diaphoresis, fever, weakness, night sweats, weight gain and weight loss.   HENT: Negative for congestion, hearing loss, hoarse voice and nosebleeds.    Eyes: Negative for blurred vision, visual disturbance and visual halos.   Cardiovascular: Positive for dyspnea on exertion. Negative for chest pain, claudication, cyanosis, irregular heartbeat, leg swelling, near-syncope, orthopnea, palpitations, paroxysmal nocturnal dyspnea and syncope.   Respiratory: Negative for cough, hemoptysis, shortness of breath, sleep disturbances due to breathing, snoring, sputum production and wheezing.    Hematologic/Lymphatic: Negative for bleeding problem. Does not bruise/bleed easily.   Skin: Negative for dry skin, itching and rash.   Musculoskeletal: Negative for arthritis, falls, joint pain, joint swelling and myalgias.   Gastrointestinal: Negative for bloating, abdominal pain, constipation, diarrhea, flatus, heartburn, hematemesis, hematochezia, melena, nausea and vomiting.   Genitourinary: Negative for dysuria, frequency, hematuria, nocturia and urgency.   Neurological: Negative for excessive daytime sleepiness, dizziness, headaches, light-headedness and loss of balance.   Psychiatric/Behavioral: Negative for depression. The patient does not have insomnia and is not nervous/anxious.        Objective:     Vitals:    11/15/19 1249 11/15/19 1250 11/15/19 1251   BP: 148/64 148/65 131/60   BP Location: Right arm Left arm Left arm   Patient Position: Sitting Sitting Standing   Cuff Size: Adult Large Adult Large Adult   Pulse: 74 75 81   Resp: 18     Temp: 97 °F (36.1 °C)     TempSrc: Temporal     SpO2: 92% 94% 95%  "  Weight: 118 kg (260 lb 4 oz)     Height: 180.3 cm (71\")       Body mass index is 36.3 kg/m².  Physical Exam   Constitutional: He is oriented to person, place, and time. He appears well-developed and well-nourished. He is active and cooperative. No distress.   HENT:   Head: Normocephalic and atraumatic.   Mouth/Throat: Oropharynx is clear and moist.   Eyes: Conjunctivae and EOM are normal. Pupils are equal, round, and reactive to light.   Neck: Normal range of motion. Neck supple. No JVD present. No tracheal deviation present. No thyromegaly present.   Cardiovascular: Normal rate, regular rhythm, normal heart sounds and intact distal pulses.   Pulmonary/Chest: Effort normal and breath sounds normal.   Abdominal: Soft. Bowel sounds are normal. He exhibits no distension. There is no tenderness.   Musculoskeletal: Normal range of motion.   Compression socks in place   Neurological: He is alert and oriented to person, place, and time.   Skin: Skin is warm, dry and intact.   Psychiatric: He has a normal mood and affect. His behavior is normal.   Nursing note and vitals reviewed.      Lab and Diagnostic Review:  Results for orders placed or performed during the hospital encounter of 11/06/19   BNP   Result Value Ref Range    proBNP 1,764.0 5.0-1,800.0 pg/mL   CBC (No Diff)   Result Value Ref Range    WBC 5.35 3.40 - 10.80 10*3/mm3    RBC 3.59 (L) 4.14 - 5.80 10*6/mm3    Hemoglobin 11.2 (L) 13.0 - 17.7 g/dL    Hematocrit 37.3 (L) 37.5 - 51.0 %    .9 (H) 79.0 - 97.0 fL    MCH 31.2 26.6 - 33.0 pg    MCHC 30.0 (L) 31.5 - 35.7 g/dL    RDW 14.4 12.3 - 15.4 %    RDW-SD 54.4 (H) 37.0 - 54.0 fl    MPV 9.2 6.0 - 12.0 fL    Platelets 167 140 - 450 10*3/mm3   Comprehensive Metabolic Panel   Result Value Ref Range    Glucose 218 (H) 65 - 99 mg/dL    BUN 28 (H) 8 - 23 mg/dL    Creatinine 1.68 (H) 0.76 - 1.27 mg/dL    Sodium 138 136 - 145 mmol/L    Potassium 4.8 3.5 - 5.2 mmol/L    Chloride 102 98 - 107 mmol/L    CO2 24.0 22.0 - " 29.0 mmol/L    Calcium 8.8 8.6 - 10.5 mg/dL    Total Protein 6.3 6.0 - 8.5 g/dL    Albumin 3.60 3.50 - 5.20 g/dL    ALT (SGPT) 10 1 - 41 U/L    AST (SGOT) 19 1 - 40 U/L    Alkaline Phosphatase 47 39 - 117 U/L    Total Bilirubin 0.5 0.2 - 1.2 mg/dL    eGFR Non African Amer 39 (L) >60 mL/min/1.73    Globulin 2.7 gm/dL    A/G Ratio 1.3 g/dL    BUN/Creatinine Ratio 16.7 7.0 - 25.0    Anion Gap 12.0 5.0 - 15.0 mmol/L   Protime-INR   Result Value Ref Range    Protime 14.1 11.2 - 14.3 Seconds    INR 1.15 0.85 - 1.16   Troponin   Result Value Ref Range    Troponin T 0.041 (C) 0.000 - 0.030 ng/mL   Hemoglobin A1c   Result Value Ref Range    Hemoglobin A1C 5.90 (H) 4.80 - 5.60 %   Magnesium   Result Value Ref Range    Magnesium 2.0 1.6 - 2.4 mg/dL   Basic Metabolic Panel   Result Value Ref Range    Glucose 137 (H) 65 - 99 mg/dL    BUN 32 (H) 8 - 23 mg/dL    Creatinine 1.85 (H) 0.76 - 1.27 mg/dL    Sodium 138 136 - 145 mmol/L    Potassium 4.5 3.5 - 5.2 mmol/L    Chloride 102 98 - 107 mmol/L    CO2 24.0 22.0 - 29.0 mmol/L    Calcium 8.8 8.6 - 10.5 mg/dL    eGFR Non African Amer 35 (L) >60 mL/min/1.73    BUN/Creatinine Ratio 17.3 7.0 - 25.0    Anion Gap 12.0 5.0 - 15.0 mmol/L   Magnesium   Result Value Ref Range    Magnesium 2.1 1.6 - 2.4 mg/dL   Troponin   Result Value Ref Range    Troponin T 0.053 (C) 0.000 - 0.030 ng/mL   Lipid Panel   Result Value Ref Range    Total Cholesterol 122 0 - 200 mg/dL    Triglycerides 152 (H) 0 - 150 mg/dL    HDL Cholesterol 33 (L) 40 - 60 mg/dL    LDL Cholesterol  59 0 - 100 mg/dL    VLDL Cholesterol 30.4 mg/dL    LDL/HDL Ratio 1.78    CBC (No Diff)   Result Value Ref Range    WBC 5.89 3.40 - 10.80 10*3/mm3    RBC 3.63 (L) 4.14 - 5.80 10*6/mm3    Hemoglobin 11.2 (L) 13.0 - 17.7 g/dL    Hematocrit 35.0 (L) 37.5 - 51.0 %    MCV 96.4 79.0 - 97.0 fL    MCH 30.9 26.6 - 33.0 pg    MCHC 32.0 31.5 - 35.7 g/dL    RDW 14.5 12.3 - 15.4 %    RDW-SD 51.2 37.0 - 54.0 fl    MPV 9.4 6.0 - 12.0 fL    Platelets  155 140 - 450 10*3/mm3   Renal Function Panel   Result Value Ref Range    Glucose 138 (H) 65 - 99 mg/dL    BUN 33 (H) 8 - 23 mg/dL    Creatinine 2.01 (H) 0.76 - 1.27 mg/dL    Sodium 137 136 - 145 mmol/L    Potassium 4.2 3.5 - 5.2 mmol/L    Chloride 100 98 - 107 mmol/L    CO2 26.0 22.0 - 29.0 mmol/L    Calcium 9.0 8.6 - 10.5 mg/dL    Albumin 3.40 (L) 3.50 - 5.20 g/dL    Phosphorus 3.9 2.5 - 4.5 mg/dL    Anion Gap 11.0 5.0 - 15.0 mmol/L    BUN/Creatinine Ratio 16.4 7.0 - 25.0    eGFR Non African Amer 32 (L) >60 mL/min/1.73   Vitamin B12   Result Value Ref Range    Vitamin B-12 734 211 - 946 pg/mL   Folate   Result Value Ref Range    Folate >20.00 4.78 - 24.20 ng/mL   Magnesium   Result Value Ref Range    Magnesium 2.1 1.6 - 2.4 mg/dL   CBC (No Diff)   Result Value Ref Range    WBC 7.11 3.40 - 10.80 10*3/mm3    RBC 3.70 (L) 4.14 - 5.80 10*6/mm3    Hemoglobin 11.4 (L) 13.0 - 17.7 g/dL    Hematocrit 35.5 (L) 37.5 - 51.0 %    MCV 95.9 79.0 - 97.0 fL    MCH 30.8 26.6 - 33.0 pg    MCHC 32.1 31.5 - 35.7 g/dL    RDW 14.3 12.3 - 15.4 %    RDW-SD 50.1 37.0 - 54.0 fl    MPV 8.9 6.0 - 12.0 fL    Platelets 164 140 - 450 10*3/mm3   Renal Function Panel   Result Value Ref Range    Glucose 176 (H) 65 - 99 mg/dL    BUN 36 (H) 8 - 23 mg/dL    Creatinine 1.80 (H) 0.76 - 1.27 mg/dL    Sodium 136 136 - 145 mmol/L    Potassium 4.5 3.5 - 5.2 mmol/L    Chloride 100 98 - 107 mmol/L    CO2 25.0 22.0 - 29.0 mmol/L    Calcium 9.0 8.6 - 10.5 mg/dL    Albumin 3.60 3.50 - 5.20 g/dL    Phosphorus 3.6 2.5 - 4.5 mg/dL    Anion Gap 11.0 5.0 - 15.0 mmol/L    BUN/Creatinine Ratio 20.0 7.0 - 25.0    eGFR Non African Amer 36 (L) >60 mL/min/1.73   POC Glucose Once   Result Value Ref Range    Glucose 161 (H) 70 - 130 mg/dL   POC Glucose Once   Result Value Ref Range    Glucose 134 (H) 70 - 130 mg/dL   POC Glucose Once   Result Value Ref Range    Glucose 124 70 - 130 mg/dL   POC Glucose Once   Result Value Ref Range    Glucose 221 (H) 70 - 130 mg/dL   POC  Glucose Once   Result Value Ref Range    Glucose 173 (H) 70 - 130 mg/dL   POC Glucose Once   Result Value Ref Range    Glucose 128 70 - 130 mg/dL   POC Glucose Once   Result Value Ref Range    Glucose 139 (H) 70 - 130 mg/dL   POC Glucose Once   Result Value Ref Range    Glucose 203 (H) 70 - 130 mg/dL   POC Glucose Once   Result Value Ref Range    Glucose 137 (H) 70 - 130 mg/dL   POC Glucose Once   Result Value Ref Range    Glucose 185 (H) 70 - 130 mg/dL   POC Glucose Once   Result Value Ref Range    Glucose 177 (H) 70 - 130 mg/dL   Adult Transthoracic Echo Complete W/ Cont if Necessary Per Protocol   Result Value Ref Range    BSA 2.5 m^2    IVSd 1.2 cm    LVIDd 4.5 cm    LVIDs 3.4 cm    LVPWd 1.1 cm    IVS/LVPW 1.0     FS 25.5 %    EDV(Teich) 92.8 ml    ESV(Teich) 46.0 ml    EF(Teich) 50.4 %    EDV(cubed) 91.6 ml    ESV(cubed) 37.9 ml    EF(cubed) 58.7 %    LV mass(C)d 183.1 grams    LV mass(C)dI 74.6 grams/m^2    SV(Teich) 46.8 ml    SI(Teich) 19.1 ml/m^2    SV(cubed) 53.7 ml    SI(cubed) 21.9 ml/m^2    Ao root diam 3.5 cm    Ao root area 9.6 cm^2    LA dimension 4.1 cm    LA/Ao 1.2     LVOT diam 2.0 cm    LVOT area 3.3 cm^2    LVOT area(traced) 3.1 cm^2    LAd major 4.7 cm    LVLd ap4 9.9 cm    EDV(MOD-sp4) 262.0 ml    LVLs ap4 8.9 cm    ESV(MOD-sp4) 141.0 ml    EF(MOD-sp4) 46.2 %    LVLd ap2 9.6 cm    EDV(MOD-sp2) 243.0 ml    LVLs ap2 9.0 cm    ESV(MOD-sp2) 139.0 ml    EF(MOD-sp2) 42.8 %    LA volume 93.0 ml    EF(MOD-bp) 45.0 %    SV(MOD-sp4) 121.0 ml    SI(MOD-sp4) 49.3 ml/m^2    SV(MOD-sp2) 104.0 ml    SI(MOD-sp2) 42.4 ml/m^2    Ao root area (BSA corrected) 1.4     LV Duran Vol (BSA corrected) 106.8 ml/m^2    LV Sys Vol (BSA corrected) 57.5 ml/m^2    LA Volume Index 37.9 ml/m^2    MV E max michell 80.0 cm/sec    MV A max michell 96.7 cm/sec    MV E/A 0.83     MV V2 max 116.2 cm/sec    MV max PG 5.4 mmHg    MV V2 mean 59.8 cm/sec    MV mean PG 1.7 mmHg    MV V2 VTI 35.8 cm    MVA(VTI) 1.7 cm^2    MV dec time 0.25 sec     Ao pk nick 133.8 cm/sec    Ao max PG 7.2 mmHg    Ao max PG (full) 4.3 mmHg    Ao V2 mean 93.9 cm/sec    Ao mean PG 3.9 mmHg    Ao mean PG (full) 2.4 mmHg    Ao V2 VTI 27.5 cm    KAYA(I,A) 2.2 cm^2    KAYA(I,D) 2.2 cm^2    KAYA(V,A) 2.1 cm^2    KAYA(V,D) 2.1 cm^2    LV V1 max PG 2.8 mmHg    LV V1 mean PG 1.5 mmHg    LV V1 max 84.4 cm/sec    LV V1 mean 57.0 cm/sec    LV V1 VTI 18.3 cm    SV(Ao) 263.6 ml    SI(Ao) 107.4 ml/m^2    SV(LVOT) 59.7 ml    SI(LVOT) 24.3 ml/m^2    PA acc slope 486.3 cm/sec^2    PA acc time 0.11 sec    PA pr(Accel) 28.3 mmHg    Lat E/e'  8.6     Med E/e' 7.5     Lat Peak E' Nick 9.1 cm/sec    Med Peak E' Nick 10.5 cm/sec     CV ECHO TAMMY - BZI_BMI 37.8 kilograms/m^2     CV ECHO TAMMY - BSA(HAYCOCK) 2.6 m^2     CV ECHO TAMMY - BZI_METRIC_WEIGHT 126.6 kg     CV ECHO TAMMY - BZI_METRIC_HEIGHT 182.9 cm    Avg E/e' ratio 8.16     Target HR (85%) 116 bpm    Max. Pred. HR (100%) 137 bpm     CV VAS BP LEFT /67 mmHg    TDI S' 11.70 cm/sec    RV Base 3.90 cm    RV Length 6.70 cm    RV Mid 3.40 cm    TAPSE (>1.6) 1.80 cm2    Echo EF Estimated 47 %     [facility] will [facility]  Assessment and Plan:   1. Chronic systolic congestive heart failure (CMS/HCC)  euvolemic  Heart failure education today including signs and symptoms, the role of the heart failure center, daily weights, low sodium diet (less than 1500 mg per day), and daily physical activity. Reviewed HF Zones with patient and family.  Patient to continue current medications as previously ordered.     2. Essential hypertension  Well controlled  HTN Education provided today including signs and symptoms, medication management, daily blood pressure monitoring. Patient encouraged to call the Heart and Valve center with any abnormal readings.     3. CKD (chronic kidney disease) stage 3, GFR 30-59 ml/min (CMS/[facility])    Creatinine at baseline prior to discharge from the hospital (1.8.  Patient had labs drawn yesterday.   [facility]          It has been a pleasure to participate in the care of this patient.  Patient was instructed to call the Heart and Valve Center with any questions, concerns, or worsening symptoms.    *Please note that portions of this note were completed with a voice recognition program. Efforts were made to edit the dictations, but occasionally words are mistranscribed.

## 2019-11-15 NOTE — PROGRESS NOTES
Labs drawn 11/14/2019: WBC 5.6, RBC 3.79, hemoglobin 11.6, hematocrit 34.9, platelets 253, glucose 154, BUN 34, creatinine 1.85, estimated GFR 33, sodium 137, potassium 5.4, chloride 99, carbon dioxide 22, calcium 9.1  Hold potassium supplement MWF

## 2019-11-20 ENCOUNTER — READMISSION MANAGEMENT (OUTPATIENT)
Dept: CALL CENTER | Facility: HOSPITAL | Age: 84
End: 2019-11-20

## 2019-11-20 NOTE — OUTREACH NOTE
CHF Week 2 Survey      Responses   Facility patient discharged from?  [facility]   Does the patient have one of the following disease processes/diagnoses(primary or secondary)?  CHF   Week 2 attempt successful?  Yes   Call start time  0921   Call end time  0924   General alerts for this patient  .   Discharge diagnosis  Acute on chronic diastolic congestive heart failure    Meds reviewed with patient/caregiver?  Yes   Is the patient having any side effects they believe may be caused by any medication additions or changes?  No   Is the patient taking all medications as directed (includes completed medication regime)?  Yes   Psychosocial issues?  No   What is the patient's perception of their health status since discharge?  Improving   Is the patient weighing daily?  Yes   Is the patient able to teach back Heart Failure diet management?  Yes   Is the patient able to teach back Heart Failure Zones?  Yes   Is the patient able to teach back signs and symptoms of worsening condition? (i.e. weight gain, shortness of air, etc.)  Yes   CHF Week 2 call completed?  Yes          Eleonora Amaral RN

## 2019-11-27 ENCOUNTER — READMISSION MANAGEMENT (OUTPATIENT)
Dept: CALL CENTER | Facility: HOSPITAL | Age: 84
End: 2019-11-27

## 2019-11-27 NOTE — OUTREACH NOTE
CHF Week 3 Survey      Responses   Facility patient discharged from?  [place]   Does the patient have one of the following disease processes/diagnoses(primary or secondary)?  CHF   Week 3 attempt successful?  No   Unsuccessful attempts  Attempt 1          Ju Davis RN

## 2019-11-29 ENCOUNTER — READMISSION MANAGEMENT (OUTPATIENT)
Dept: CALL CENTER | Facility: HOSPITAL | Age: 84
End: 2019-11-29

## 2019-11-29 NOTE — OUTREACH NOTE
CHF Week 3 Survey      Responses   Facility patient discharged from?  [place]   Does the patient have one of the following disease processes/diagnoses(primary or secondary)?  CHF   Week 3 attempt successful?  Yes   Call start time  0917   Call end time  0919   Discharge diagnosis  Acute on chronic diastolic congestive heart failure    Is patient permission given to speak with other caregiver?  Yes   List who call center can speak with  jose Sandoval   Is the patient taking all medications as directed (includes completed medication regime)?  Yes   Does the patient have a primary care provider?   Yes   Has the patient kept scheduled appointments due by today?  Yes   Has home health visited the patient within 72 hours of discharge?  N/A   Psychosocial issues?  No   Did the patient receive a copy of their discharge instructions?  Yes   Nursing interventions  Reviewed instructions with patient   What is the patient's perception of their health status since discharge?  Improving   Nursing interventions  Nurse provided patient education   Is the patient weighing daily?  Yes [Patient reports stable weight. ]   Does the patient have scales?  Yes   Daily weight interventions  Education provided on importance of daily weight   Is the patient able to teach back signs and symptoms of worsening condition? (i.e. weight gain, shortness of air, etc.)  Yes   Is the patient/caregiver able to teach back the hierarchy of who to call/visit for symptoms/problems? PCP, Specialist, Home health nurse, Urgent Care, ED, 911  Yes   CHF Week 3 call completed?  Yes          Eleonora Lund RN

## 2019-12-03 ENCOUNTER — OFFICE VISIT (OUTPATIENT)
Dept: CARDIOLOGY | Facility: CLINIC | Age: 84
End: 2019-12-03

## 2019-12-03 VITALS
DIASTOLIC BLOOD PRESSURE: 62 MMHG | SYSTOLIC BLOOD PRESSURE: 118 MMHG | HEIGHT: 71 IN | BODY MASS INDEX: 36.26 KG/M2 | WEIGHT: 259 LBS | HEART RATE: 80 BPM | OXYGEN SATURATION: 97 %

## 2019-12-03 DIAGNOSIS — E78.5 HYPERLIPIDEMIA LDL GOAL <70: ICD-10-CM

## 2019-12-03 DIAGNOSIS — I10 ESSENTIAL HYPERTENSION: ICD-10-CM

## 2019-12-03 DIAGNOSIS — I25.119 CORONARY ARTERY DISEASE INVOLVING NATIVE CORONARY ARTERY OF NATIVE HEART WITH ANGINA PECTORIS (HCC): Primary | ICD-10-CM

## 2019-12-03 DIAGNOSIS — I50.32 CHRONIC DIASTOLIC CONGESTIVE HEART FAILURE (HCC): ICD-10-CM

## 2019-12-03 PROBLEM — R19.7 DIARRHEA OF PRESUMED INFECTIOUS ORIGIN: Status: RESOLVED | Noted: 2019-02-22 | Resolved: 2019-12-03

## 2019-12-03 PROBLEM — R33.9 URINARY RETENTION: Status: ACTIVE | Noted: 2019-11-06

## 2019-12-03 PROBLEM — I50.33 ACUTE ON CHRONIC DIASTOLIC CONGESTIVE HEART FAILURE: Status: RESOLVED | Noted: 2019-02-22 | Resolved: 2019-12-03

## 2019-12-03 PROBLEM — R11.14 BILIOUS VOMITING WITH NAUSEA: Status: RESOLVED | Noted: 2019-02-22 | Resolved: 2019-12-03

## 2019-12-03 PROCEDURE — 99214 OFFICE O/P EST MOD 30 MIN: CPT | Performed by: INTERNAL MEDICINE

## 2019-12-03 RX ORDER — SIMVASTATIN 20 MG
20 TABLET ORAL NIGHTLY
Qty: 90 TABLET | Refills: 1 | Status: SHIPPED | OUTPATIENT
Start: 2019-12-03

## 2019-12-03 RX ORDER — DOXAZOSIN 2 MG/1
2 TABLET ORAL NIGHTLY
Qty: 90 TABLET | Refills: 1
Start: 2019-12-03

## 2019-12-03 RX ORDER — METOPROLOL SUCCINATE 50 MG/1
50 TABLET, EXTENDED RELEASE ORAL DAILY
Qty: 90 TABLET | Refills: 1 | Status: SHIPPED | OUTPATIENT
Start: 2019-12-03

## 2019-12-03 RX ORDER — BUMETANIDE 0.5 MG/1
0.5 TABLET ORAL DAILY
Qty: 30 TABLET | Refills: 0 | Status: SHIPPED | OUTPATIENT
Start: 2019-12-03 | End: 2020-06-23 | Stop reason: SDUPTHER

## 2019-12-03 NOTE — PROGRESS NOTES
[facility] at [facility]  Follow-up note    Encounter Date:12/03/2019    Patient ID: Lico Diego is a  84 y.o. male who resides in [place], [place].    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 Normal LVEF  · Cardiac cath (2003): 3/4 grafts patent. Normal LVEF.  Medical management  · PET stress (2013): Inferior infarct with ischemia. Medical management recommended         Current Assessment & Plan     · No angina  · Continue low-dose aspirin, beta-blocker, and statin therapy         Relevant Medications    aspirin 81 MG tablet    metoprolol succinate XL (TOPROL-XL) 50 MG 24 hr tablet    Chronic diastolic congestive heart failure (CMS/HCC)    Overview     · Echo (2/26/2019): LVEF 45%.  Mild MR.  Moderate TR.  RVSP 64 mmHg  · Echo (11/7/2019): LVEF 47%. Mild septal asymmetric hypertrophy.         Current Assessment & Plan     · Stable NYHA class II symptoms  · Continue Bumex with dosing prescribed by nephrology         Relevant Medications    metoprolol succinate XL (TOPROL-XL) 50 MG 24 hr tablet    bumetanide (BUMEX) 0.5 MG tablet    Essential hypertension    Current Assessment & Plan     · Controlled  · Continue present medical therapy         Relevant Medications    doxazosin (CARDURA) 2 MG tablet    metoprolol succinate XL (TOPROL-XL) 50 MG 24 hr tablet    bumetanide (BUMEX) 0.5 MG tablet    Hyperlipidemia LDL goal <70    Overview     · High intensity statin therapy indicated given the presence of CAD         Current Assessment & Plan     · Patient would prefer to keep current statin regimen, although he is a candidate for high intensity  statin therapy i.e. atorvastatin 40+ mg  given the presence of coronary disease         Relevant Medications    simvastatin (ZOCOR) 20 MG tablet               · Continue present medical therapy  Return in about 6 months (around 6/3/2020).            Lico Diego is a 84 y.o. male who returns to my office for follow-up of his coronary artery disease, chronic diastolic heart failure, and cardiac risk factors.  The patient has been doing well over the last several months.  He has been following closely with nephrology who have discontinued his ACE inhibitor and started him on doxazosin.  He has had improvement in his blood pressure and his daughter who accompanies him states that he has been better adherent to a low salt diet.  The patient denies any angina.    Review of Systems   Constitution: Negative for weakness and malaise/fatigue.   Eyes: Negative for vision loss in left eye and vision loss in right eye.   Cardiovascular: Positive for leg swelling. Negative for chest pain, dyspnea on exertion, near-syncope, orthopnea, palpitations, paroxysmal nocturnal dyspnea and syncope.   Respiratory: Positive for cough, shortness of breath, snoring, sputum production and wheezing.    Musculoskeletal: Negative for myalgias.   Neurological: Positive for light-headedness. Negative for brief paralysis, excessive daytime sleepiness, focal weakness, numbness and paresthesias.   All other systems reviewed and are negative.      The patient's past medical, social, family history and ROS reviewed in the patient's electronic medical record.    Allergies  Patient has no known allergies.    Outpatient Medications Marked as Taking for the 12/3/19 encounter (Office Visit) with Sanjay Govea IV, MD   Medication Sig Dispense Refill   • albuterol (PROVENTIL) (2.5 MG/3ML) 0.083% nebulizer solution Take 2.5 mg by nebulization Every 8 (Eight) Hours As Needed for Wheezing.     • albuterol sulfate  (90 Base) MCG/ACT inhaler  Inhale 1-2 puffs Every 4 (Four) Hours As Needed for Wheezing.     • allopurinol (ZYLOPRIM) 100 MG tablet Take 100 mg by mouth Daily.     • aspirin 81 MG tablet Take 1 tablet by mouth Daily. 30 tablet 0   • bumetanide (BUMEX) 0.5 MG tablet Take 1 tablet by mouth Daily. 30 tablet 0   • citalopram (CeleXA) 10 MG tablet Take 10 mg by mouth Daily.     • doxazosin (CARDURA) 2 MG tablet Take 1 tablet by mouth Every Night. 90 tablet 1   • fluticasone (FLONASE) 50 MCG/ACT nasal spray 2 sprays by Each Nare route Daily.     • glipiZIDE (GLUCOTROL) 2.5 MG half tablet Take 2.5-5 mg by mouth See Admin Instructions. Take 5mg in the morning and 2.5mg at night     • Insulin Glargine (TOUJEO MAX SOLOSTAR) 300 UNIT/ML solution pen-injector Inject 20 Units under the skin into the appropriate area as directed Every Night.     • iron polysaccharides (NIFEREX) 150 MG capsule Take 150 mg by mouth Daily.     • Loratadine (CLARITIN) 10 MG capsule Take  by mouth Daily.     • metoprolol succinate XL (TOPROL-XL) 50 MG 24 hr tablet Take 1 tablet by mouth Daily. 90 tablet 1   • Multiple Vitamin (MULTI-VITAMIN DAILY PO) Take 1 tablet by mouth Daily.     • nitroglycerin (NITROSTAT) 0.4 MG SL tablet Place 1 tablet under the tongue Every 5 (Five) Minutes As Needed for Chest Pain. Take no more than 3 doses in 15 minutes. 25 tablet 0   • simvastatin (ZOCOR) 20 MG tablet Take 1 tablet by mouth Every Night. 90 tablet 1   • vitamin B-12 (CYANOCOBALAMIN) 1000 MCG tablet Take 1,000 mcg by mouth Daily.     • [DISCONTINUED] aspirin 81 MG tablet Take 1 tablet by mouth Daily. 30 tablet 0   • [DISCONTINUED] bumetanide (BUMEX) 0.5 MG tablet Take 1 tablet by mouth Daily. 30 tablet 0   • [DISCONTINUED] doxazosin (CARDURA) 2 MG tablet Take 2 mg by mouth Every Night.     • [DISCONTINUED] metoprolol succinate XL (TOPROL-XL) 25 MG 24 hr tablet Take 1 tablet by mouth 2 (Two) Times a Day for 30 days. 60 tablet 0   • [DISCONTINUED] simvastatin (ZOCOR) 20 MG tablet Take 20  "mg by mouth Every Night.             Blood pressure 118/62, pulse 80, height 180.3 cm (71\"), weight 117 kg (259 lb), SpO2 97 %.  Body mass index is 36.12 kg/m².  Vitals:    12/03/19 0942   Patient Position: Sitting       Physical Exam   Constitutional: He is oriented to person, place, and time. He appears well-developed and well-nourished.   HENT:   Head: Normocephalic and atraumatic.   Eyes: Pupils are equal, round, and reactive to light. No scleral icterus.   Neck: No JVD present. Carotid bruit is not present. No thyromegaly present.   Cardiovascular: Normal rate, regular rhythm, S1 normal and S2 normal. Exam reveals no gallop.   No murmur heard.  Pulmonary/Chest: Effort normal and breath sounds normal.   Abdominal: Soft. There is no hepatosplenomegaly. There is no tenderness.   Neurological: He is alert and oriented to person, place, and time.   Skin: Skin is warm and dry. No cyanosis. Nails show no clubbing.   Psychiatric: He has a normal mood and affect. His behavior is normal.       Data Review (reviewed with patient):     Procedures    Lab Results   Component Value Date    CHOL 122 11/08/2019    TRIG 152 (H) 11/08/2019    HDL 33 (L) 11/08/2019    LDL 59 11/08/2019    AST 19 11/06/2019    ALT 10 11/06/2019       Lab Results   Component Value Date    HGBA1C 5.90 (H) 11/06/2019         ALIYAH Govea MD [facility], [facility]  Interventional and General Cardiology    "

## 2019-12-03 NOTE — ASSESSMENT & PLAN NOTE
· Patient would prefer to keep current statin regimen, although he is a candidate for high intensity statin therapy i.e. atorvastatin 40+ mg  given the presence of coronary disease

## 2019-12-09 ENCOUNTER — READMISSION MANAGEMENT (OUTPATIENT)
Dept: CALL CENTER | Facility: HOSPITAL | Age: 84
End: 2019-12-09

## 2019-12-09 NOTE — OUTREACH NOTE
CHF Week 4 Survey      Responses   Facility patient discharged from?  [place]   Does the patient have one of the following disease processes/diagnoses(primary or secondary)?  CHF   Week 4 attempt successful?  Yes   Call start time  1433   Call end time  1437   Meds reviewed with patient/caregiver?  Yes   Is the patient having any side effects they believe may be caused by any medication additions or changes?  No   Is the patient taking all medications as directed (includes completed medication regime)?  Yes   Has the patient kept scheduled appointments due by today?  Yes   Is the patient still receiving Home Health Services?  No   Psychosocial issues?  No   What is the patient's perception of their health status since discharge?  Improving   Is the patient weighing daily?  Yes   Does the patient have scales?  Yes   Is the patient able to teach back Heart Failure diet management?  Yes   Is the patient able to teach back Heart Failure Zones?  Yes [green zone today]   Is the patient able to teach back signs and symptoms of worsening condition? (i.e. weight gain, shortness of air, etc.)  Yes   Is the patient/caregiver able to teach back the hierarchy of who to call/visit for symptoms/problems? PCP, Specialist, Home health nurse, Urgent Care, ED, 911  Yes   Week 4 Call Completed?  Yes   Would the patient like one additional call?  No   Graduated  Yes   Wrap up additional comments  States is doing well-denies any edema or worsening SOA. No complaints today. States will call if any questions/concerns.          Alanis Lopez RN

## 2020-06-23 ENCOUNTER — OFFICE VISIT (OUTPATIENT)
Dept: CARDIOLOGY | Facility: CLINIC | Age: 85
End: 2020-06-23

## 2020-06-23 VITALS
DIASTOLIC BLOOD PRESSURE: 70 MMHG | HEART RATE: 80 BPM | SYSTOLIC BLOOD PRESSURE: 128 MMHG | HEIGHT: 71 IN | TEMPERATURE: 97.5 F | WEIGHT: 258.8 LBS | OXYGEN SATURATION: 93 % | BODY MASS INDEX: 36.23 KG/M2

## 2020-06-23 DIAGNOSIS — I10 ESSENTIAL HYPERTENSION: ICD-10-CM

## 2020-06-23 DIAGNOSIS — I50.32 CHRONIC HEART FAILURE WITH PRESERVED EJECTION FRACTION (HFPEF) (HCC): Primary | ICD-10-CM

## 2020-06-23 DIAGNOSIS — E78.5 HYPERLIPIDEMIA LDL GOAL <70: ICD-10-CM

## 2020-06-23 DIAGNOSIS — I25.119 CORONARY ARTERY DISEASE INVOLVING NATIVE CORONARY ARTERY OF NATIVE HEART WITH ANGINA PECTORIS (HCC): ICD-10-CM

## 2020-06-23 PROCEDURE — 99213 OFFICE O/P EST LOW 20 MIN: CPT | Performed by: INTERNAL MEDICINE

## 2020-06-23 RX ORDER — ACETAMINOPHEN 500 MG
500 TABLET ORAL EVERY 6 HOURS PRN
COMMUNITY

## 2020-06-23 RX ORDER — IRON POLYSACCHARIDE COMPLEX 150 MG
150 CAPSULE ORAL DAILY
COMMUNITY

## 2020-06-23 RX ORDER — BUMETANIDE 0.5 MG/1
0.5 TABLET ORAL DAILY
Qty: 30 TABLET | Refills: 0
Start: 2020-06-23 | End: 2021-05-04

## 2020-06-23 RX ORDER — CETIRIZINE HYDROCHLORIDE 10 MG/1
10 TABLET ORAL DAILY
COMMUNITY

## 2020-06-23 NOTE — PROGRESS NOTES
[facility] at [facility]  Office Visit Note    DATE: 06/23/2020    IDENTIFICATION: Lico Diego is a  84 y.o. male who resides in [place], [place].    REASON FOR VISIT:  • Coronary artery disease  • Chronic diastolic heart failure  • Cardiac risk factors            Lico Diego presents to my office for follow-up of his coronary artery disease, chronic diastolic heart failure, premature ventricular contractions, and ardiac risk factors.  Patient has had a relatively stable clinical course since his last visit.  His shortness of breath has improved with low-salt diet which is being managed by his daughter who buys his groceries.  He denies any exertional chest discomfort to suggest angina.  He is scheduled to see nephrology on Thursday for reassessment of his stage III-IV chronic kidney disease.    Review of Systems   Cardiovascular: Positive for leg swelling. Negative for chest pain, claudication, cyanosis, dyspnea on exertion, irregular heartbeat, near-syncope, orthopnea, palpitations and paroxysmal nocturnal dyspnea.   Respiratory: Positive for cough, shortness of breath, sleep disturbances due to breathing and sputum production. Negative for hemoptysis, snoring and wheezing.    All other systems reviewed and are negative.      The patient's past medical, social, family history and ROS reviewed in the patient's electronic medical record.    No Known Allergies      Current Outpatient Medications:   •  acetaminophen (TYLENOL) 500 MG tablet, Take 500 mg by mouth Every 6 (Six) Hours As Needed for Mild Pain ., Disp: , Rfl:   •  albuterol (PROVENTIL) (2.5 MG/3ML) 0.083% nebulizer solution, Take 2.5 mg by nebulization Every 8 (Eight) Hours As Needed for Wheezing., Disp: , Rfl:   •  albuterol sulfate  (90 Base) MCG/ACT inhaler, Inhale 1-2 puffs Every 4 (Four) Hours As Needed for Wheezing., Disp: , Rfl:   •  allopurinol (ZYLOPRIM) 100 MG tablet, Take 100 mg by mouth Daily., Disp: ,  Rfl:   •  aspirin 81 MG tablet, Take 1 tablet by mouth Daily., Disp: 30 tablet, Rfl: 0  •  bumetanide (BUMEX) 0.5 MG tablet, Take 1 tablet by mouth Daily., Disp: 30 tablet, Rfl: 0  •  cetirizine (zyrTEC) 10 MG tablet, Take 10 mg by mouth Daily., Disp: , Rfl:   •  citalopram (CeleXA) 10 MG tablet, Take 10 mg by mouth Daily., Disp: , Rfl:   •  doxazosin (CARDURA) 2 MG tablet, Take 1 tablet by mouth Every Night., Disp: 90 tablet, Rfl: 1  •  fluticasone (FLONASE) 50 MCG/ACT nasal spray, 2 sprays by Each Nare route Daily., Disp: , Rfl:   •  glipiZIDE (GLUCOTROL) 2.5 MG half tablet, Take 5 mg by mouth Daily., Disp: , Rfl:   •  Insulin Glargine (TOUJEO MAX SOLOSTAR) 300 UNIT/ML solution pen-injector, Inject 20 Units under the skin into the appropriate area as directed Every Night., Disp: , Rfl:   •  iron polysaccharides (NIFEREX) 150 MG capsule, Take 150 mg by mouth Daily., Disp: , Rfl:   •  Loratadine (CLARITIN) 10 MG capsule, Take  by mouth Daily., Disp: , Rfl:   •  metoprolol succinate XL (TOPROL-XL) 50 MG 24 hr tablet, Take 1 tablet by mouth Daily., Disp: 90 tablet, Rfl: 1  •  Multiple Vitamin (MULTI-VITAMIN DAILY PO), Take 1 tablet by mouth Daily., Disp: , Rfl:   •  nitroglycerin (NITROSTAT) 0.4 MG SL tablet, Place 1 tablet under the tongue Every 5 (Five) Minutes As Needed for Chest Pain. Take no more than 3 doses in 15 minutes., Disp: 25 tablet, Rfl: 0  •  simvastatin (ZOCOR) 20 MG tablet, Take 1 tablet by mouth Every Night., Disp: 90 tablet, Rfl: 1  •  vitamin B-12 (CYANOCOBALAMIN) 1000 MCG tablet, Take 1,000 mcg by mouth Daily., Disp: , Rfl:     Past Medical History:   Diagnosis Date   • Basal cell carcinoma    • CKD (chronic kidney disease)    • COPD (chronic obstructive pulmonary disease) (CMS/HCC)    • Coronary artery disease 1998   • Diabetes mellitus (CMS/HCC)    • H/O seasonal allergies    • Hypertension    • Myocardial infarction (CMS/HCC)    • JC (obstructive sleep apnea)     cpap   • Prostate cancer  "(CMS/[facility])     RADIATION TREATMENTS       Past Surgical History:   Procedure Laterality Date   • ANKLE OPEN REDUCTION INTERNAL FIXATION Right 2/23/2019    Procedure: OPEN REDUCTION INTERNAL FIXATION RIGHT LATERAL MALLEOLUS FRACTURE AND SYNDESMOSIS;  Surgeon: Ken Abreu MD;  Location: [facility];  Service: Orthopedics   • CORONARY ARTERY BYPASS GRAFT  1998    LIMA to LAD, SVG to circumflex, SVG to RPDA, SVG to diagonal   • SKIN BIOPSY         Family History   Problem Relation Age of Onset   • No Known Problems Mother    • No Known Problems Father    • No Known Problems Maternal Grandmother    • Kidney disease Maternal Grandfather        Social History     Tobacco Use   • Smoking status: Never Smoker   • Smokeless tobacco: Never Used   Substance Use Topics   • Alcohol use: Yes     Alcohol/week: 1.0 standard drinks     Types: 1 Shots of liquor per week     Frequency: Monthly or less           Blood pressure 128/70, pulse 80, temperature 97.5 °F (36.4 °C), height 180.3 cm (71\"), weight 117 kg (258 lb 12.8 oz), SpO2 93 %.  Body mass index is 36.1 kg/m².  Vitals:    06/23/20 1543   Patient Position: Sitting       Physical Exam   Constitutional: He is oriented to person, place, and time. He appears well-developed and well-nourished.   HENT:   Head: Normocephalic and atraumatic.   Eyes: Pupils are equal, round, and reactive to light. No scleral icterus.   Neck: No JVD present. Carotid bruit is not present. No thyromegaly present.   Cardiovascular: Normal rate, regular rhythm, S1 normal and S2 normal. Exam reveals no gallop.   No murmur heard.  R>LLE edema   Pulmonary/Chest: Effort normal and breath sounds normal.   Abdominal: Soft. He exhibits no mass. There is no hepatosplenomegaly. There is no tenderness.   Neurological: He is alert and oriented to person, place, and time.   Skin: Skin is warm and dry. No cyanosis. Nails show no clubbing.   Psychiatric: He has a normal mood and affect. His behavior is normal. "       Data Review (reviewed with patient):     Procedures    Lab Results   Component Value Date    GLUCOSE 176 (H) 11/09/2019    BUN 36 (H) 11/09/2019    CREATININE 1.80 (H) 11/09/2019    EGFRIFNONA 36 (L) 11/09/2019    BCR 20.0 11/09/2019    K 4.5 11/09/2019    CO2 25.0 11/09/2019    CALCIUM 9.0 11/09/2019    ALBUMIN 3.60 11/09/2019    ALKPHOS 47 11/06/2019    AST 19 11/06/2019    ALT 10 11/06/2019       Lab Results   Component Value Date    CHOL 122 11/08/2019    TRIG 152 (H) 11/08/2019    HDL 33 (L) 11/08/2019    LDL 59 11/08/2019      Lab Results   Component Value Date    HGBA1C 5.90 (H) 11/06/2019     Lab Results   Component Value Date    WBC 7.11 11/09/2019    HGB 11.4 (L) 11/09/2019    HCT 35.5 (L) 11/09/2019    MCV 95.9 11/09/2019     11/09/2019            Problem List Items Addressed This Visit        Cardiology Problems    Coronary artery disease involving native coronary artery of native heart with angina pectoris (CMS/[facility])    Overview     · CABG (1998): LIMA to LAD, SVG to circumflex, SVG to RPDA, SVG to diagonal. Normal LVEF  · Cardiac cath (2003): 3/4 grafts patent. Normal LVEF.  Medical management  · PET stress (2013): Inferior infarct with ischemia. Medical management recommended         Current Assessment & Plan     · No angina  · Continue low-dose aspirin, beta-blocker, and statin therapy         Chronic heart failure with preserved ejection fraction (HFpEF) (CMS/[facility]) - Primary    Overview     · Echo (2/26/2019): LVEF 45%.  Mild MR.  Moderate TR.  RVSP 64 mmHg  · Echo (11/7/2019): LVEF 47%. Mild septal asymmetric hypertrophy.         Current Assessment & Plan     · Stable NYHA class 2-3 symptoms  · Bumex to be managed by nephrology         Relevant Medications    bumetanide (BUMEX) 0.5 MG tablet    Essential hypertension    Relevant Medications    bumetanide (BUMEX) 0.5 MG tablet    Hyperlipidemia LDL goal <70    Overview     · Moderate intensity statin therapy reasonable given the presence  of CAD         Current Assessment & Plan     · Continue rosuvastatin                    · Continue present medical therapy  Return in about 6 months (around 12/23/2020).      ALIYAH Govea MD [facility], [facility]  Interventional and General Cardiology      6/23/2020

## 2021-05-04 ENCOUNTER — OFFICE VISIT (OUTPATIENT)
Dept: CARDIOLOGY | Facility: CLINIC | Age: 86
End: 2021-05-04

## 2021-05-04 VITALS
HEART RATE: 74 BPM | WEIGHT: 251 LBS | TEMPERATURE: 96.5 F | HEIGHT: 71 IN | OXYGEN SATURATION: 92 % | BODY MASS INDEX: 35.14 KG/M2 | DIASTOLIC BLOOD PRESSURE: 58 MMHG | SYSTOLIC BLOOD PRESSURE: 100 MMHG

## 2021-05-04 DIAGNOSIS — I25.119 CORONARY ARTERY DISEASE INVOLVING NATIVE CORONARY ARTERY OF NATIVE HEART WITH ANGINA PECTORIS (HCC): Primary | ICD-10-CM

## 2021-05-04 DIAGNOSIS — E78.5 HYPERLIPIDEMIA LDL GOAL <70: ICD-10-CM

## 2021-05-04 DIAGNOSIS — I50.32 CHRONIC HEART FAILURE WITH PRESERVED EJECTION FRACTION (HFPEF) (HCC): ICD-10-CM

## 2021-05-04 DIAGNOSIS — I49.3 PREMATURE VENTRICULAR CONTRACTIONS (PVCS) (VPCS): ICD-10-CM

## 2021-05-04 DIAGNOSIS — I10 ESSENTIAL HYPERTENSION: ICD-10-CM

## 2021-05-04 PROCEDURE — 99214 OFFICE O/P EST MOD 30 MIN: CPT | Performed by: NURSE PRACTITIONER

## 2021-05-04 RX ORDER — BUMETANIDE 0.5 MG/1
0.5 TABLET ORAL DAILY
Qty: 30 TABLET | Refills: 0
Start: 2021-05-04

## 2021-05-04 NOTE — ASSESSMENT & PLAN NOTE
· Hypertension is controlled  · Continue doxazosin 2 mg nightly  · Continue metoprolol succinate 50 mg daily

## 2021-05-04 NOTE — ASSESSMENT & PLAN NOTE
· Stable NYHA class II symptoms  · 2 g low sodium diet  · Daily weights  · Bumex 0.5 mg 2 tabs in the a.m. and 1 in the PM.

## 2021-05-04 NOTE — ASSESSMENT & PLAN NOTE
· Continue metoprolol succinate 50 mg daily  · Sublingual nitroglycerin as needed for any episodes of angina

## 2022-01-01 ENCOUNTER — HOSPITAL ENCOUNTER (EMERGENCY)
Facility: HOSPITAL | Age: 87
Discharge: HOME | End: 2022-06-21
Attending: EMERGENCY MEDICINE | Admitting: EMERGENCY MEDICINE

## 2022-01-01 ENCOUNTER — APPOINTMENT (OUTPATIENT)
Dept: GENERAL RADIOLOGY | Facility: HOSPITAL | Age: 87
End: 2022-01-01

## 2022-01-01 ENCOUNTER — HOSPITAL ENCOUNTER (INPATIENT)
Facility: HOSPITAL | Age: 87
LOS: 1 days | End: 2022-06-22
Attending: INTERNAL MEDICINE | Admitting: INTERNAL MEDICINE

## 2022-01-01 VITALS
WEIGHT: 250 LBS | RESPIRATION RATE: 18 BRPM | HEART RATE: 85 BPM | HEIGHT: 71 IN | DIASTOLIC BLOOD PRESSURE: 74 MMHG | BODY MASS INDEX: 35 KG/M2 | OXYGEN SATURATION: 92 % | SYSTOLIC BLOOD PRESSURE: 125 MMHG

## 2022-01-01 DIAGNOSIS — Z51.5 HOSPICE CARE: ICD-10-CM

## 2022-01-01 DIAGNOSIS — I46.9 CARDIOPULMONARY ARREST: Primary | ICD-10-CM

## 2022-01-01 LAB
ALBUMIN SERPL-MCNC: 3.2 G/DL (ref 3.5–5.2)
ALBUMIN/GLOB SERPL: 1.1 G/DL
ALP SERPL-CCNC: 67 U/L (ref 39–117)
ALT SERPL W P-5'-P-CCNC: 26 U/L (ref 1–41)
ANION GAP SERPL CALCULATED.3IONS-SCNC: 14 MMOL/L (ref 5–15)
AST SERPL-CCNC: 48 U/L (ref 1–40)
BASOPHILS # BLD AUTO: 0.07 10*3/MM3 (ref 0–0.2)
BASOPHILS NFR BLD AUTO: 0.5 % (ref 0–1.5)
BILIRUB SERPL-MCNC: 0.6 MG/DL (ref 0–1.2)
BUN BLDA-MCNC: 28 MG/DL (ref 8–26)
BUN SERPL-MCNC: 27 MG/DL (ref 8–23)
BUN/CREAT SERPL: 15.1 (ref 7–25)
CA-I BLDA-SCNC: 1.53 MMOL/L (ref 1.2–1.32)
CALCIUM SPEC-SCNC: 11.5 MG/DL (ref 8.6–10.5)
CHLORIDE BLDA-SCNC: 100 MMOL/L (ref 98–109)
CHLORIDE SERPL-SCNC: 97 MMOL/L (ref 98–107)
CO2 BLDA-SCNC: 26 MMOL/L (ref 24–29)
CO2 SERPL-SCNC: 23 MMOL/L (ref 22–29)
CREAT BLDA-MCNC: 2 MG/DL (ref 0.6–1.3)
CREAT SERPL-MCNC: 1.79 MG/DL (ref 0.76–1.27)
DEPRECATED RDW RBC AUTO: 48.1 FL (ref 37–54)
EGFRCR SERPLBLD CKD-EPI 2021: 32.1 ML/MIN/1.73
EGFRCR SERPLBLD CKD-EPI 2021: 36.7 ML/MIN/1.73
EOSINOPHIL # BLD AUTO: 0.21 10*3/MM3 (ref 0–0.4)
EOSINOPHIL NFR BLD AUTO: 1.4 % (ref 0.3–6.2)
ERYTHROCYTE [DISTWIDTH] IN BLOOD BY AUTOMATED COUNT: 14 % (ref 12.3–15.4)
GLOBULIN UR ELPH-MCNC: 3 GM/DL
GLUCOSE BLDC GLUCOMTR-MCNC: 234 MG/DL (ref 70–130)
GLUCOSE SERPL-MCNC: 236 MG/DL (ref 65–99)
HCT VFR BLD AUTO: 42.5 % (ref 37.5–51)
HCT VFR BLDA CALC: 41 % (ref 38–51)
HGB BLD-MCNC: 14.3 G/DL (ref 13–17.7)
HGB BLDA-MCNC: 13.9 G/DL (ref 12–17)
HOLD SPECIMEN: NORMAL
IMM GRANULOCYTES # BLD AUTO: 0.82 10*3/MM3 (ref 0–0.05)
IMM GRANULOCYTES NFR BLD AUTO: 5.4 % (ref 0–0.5)
LIPASE SERPL-CCNC: 17 U/L (ref 13–60)
LYMPHOCYTES # BLD AUTO: 4.97 10*3/MM3 (ref 0.7–3.1)
LYMPHOCYTES NFR BLD AUTO: 32.5 % (ref 19.6–45.3)
MCH RBC QN AUTO: 31.8 PG (ref 26.6–33)
MCHC RBC AUTO-ENTMCNC: 33.6 G/DL (ref 31.5–35.7)
MCV RBC AUTO: 94.4 FL (ref 79–97)
MONOCYTES # BLD AUTO: 0.68 10*3/MM3 (ref 0.1–0.9)
MONOCYTES NFR BLD AUTO: 4.4 % (ref 5–12)
NEUTROPHILS NFR BLD AUTO: 55.8 % (ref 42.7–76)
NEUTROPHILS NFR BLD AUTO: 8.55 10*3/MM3 (ref 1.7–7)
NRBC BLD AUTO-RTO: 0.2 /100 WBC (ref 0–0.2)
NT-PROBNP SERPL-MCNC: 1846 PG/ML (ref 0–1800)
PLATELET # BLD AUTO: 213 10*3/MM3 (ref 140–450)
PMV BLD AUTO: 9.6 FL (ref 6–12)
POTASSIUM BLDA-SCNC: 4.5 MMOL/L (ref 3.5–4.9)
POTASSIUM SERPL-SCNC: 4.6 MMOL/L (ref 3.5–5.2)
PROT SERPL-MCNC: 6.2 G/DL (ref 6–8.5)
QT INTERVAL: 390 MS
QTC INTERVAL: 474 MS
RBC # BLD AUTO: 4.5 10*6/MM3 (ref 4.14–5.8)
SODIUM BLD-SCNC: 137 MMOL/L (ref 138–146)
SODIUM SERPL-SCNC: 134 MMOL/L (ref 136–145)
TROPONIN T SERPL-MCNC: 0.04 NG/ML (ref 0–0.03)
WBC NRBC COR # BLD: 15.3 10*3/MM3 (ref 3.4–10.8)
WHOLE BLOOD HOLD COAG: NORMAL
WHOLE BLOOD HOLD SPECIMEN: NORMAL

## 2022-01-01 PROCEDURE — 25010000002 HYDROMORPHONE HCL PF 500 MG/50ML SOLUTION: Performed by: NURSE PRACTITIONER

## 2022-01-01 PROCEDURE — 80053 COMPREHEN METABOLIC PANEL: CPT | Performed by: EMERGENCY MEDICINE

## 2022-01-01 PROCEDURE — 25010000002 EPINEPHRINE 1 MG/10ML SOLUTION PREFILLED SYRINGE: Performed by: EMERGENCY MEDICINE

## 2022-01-01 PROCEDURE — 99285 EMERGENCY DEPT VISIT HI MDM: CPT

## 2022-01-01 PROCEDURE — 25010000002 HYDROMORPHONE 1 MG/ML SOLUTION: Performed by: NURSE PRACTITIONER

## 2022-01-01 PROCEDURE — 83880 ASSAY OF NATRIURETIC PEPTIDE: CPT | Performed by: EMERGENCY MEDICINE

## 2022-01-01 PROCEDURE — 92950 HEART/LUNG RESUSCITATION CPR: CPT

## 2022-01-01 PROCEDURE — 25010000002 MIDAZOLAM PER 1 MG: Performed by: NURSE PRACTITIONER

## 2022-01-01 PROCEDURE — 25010000002 HYDROMORPHONE PER 4 MG: Performed by: NURSE PRACTITIONER

## 2022-01-01 PROCEDURE — 25010000002 FUROSEMIDE PER 20 MG: Performed by: NURSE PRACTITIONER

## 2022-01-01 PROCEDURE — 93005 ELECTROCARDIOGRAM TRACING: CPT | Performed by: EMERGENCY MEDICINE

## 2022-01-01 PROCEDURE — 25010000002 MIDAZOLAM 50 MG/10ML SOLUTION: Performed by: NURSE PRACTITIONER

## 2022-01-01 PROCEDURE — 99284 EMERGENCY DEPT VISIT MOD MDM: CPT

## 2022-01-01 PROCEDURE — 85025 COMPLETE CBC W/AUTO DIFF WBC: CPT | Performed by: EMERGENCY MEDICINE

## 2022-01-01 PROCEDURE — 83690 ASSAY OF LIPASE: CPT | Performed by: EMERGENCY MEDICINE

## 2022-01-01 PROCEDURE — 71045 X-RAY EXAM CHEST 1 VIEW: CPT

## 2022-01-01 PROCEDURE — 85014 HEMATOCRIT: CPT

## 2022-01-01 PROCEDURE — 80047 BASIC METABLC PNL IONIZED CA: CPT

## 2022-01-01 PROCEDURE — 84484 ASSAY OF TROPONIN QUANT: CPT | Performed by: EMERGENCY MEDICINE

## 2022-01-01 PROCEDURE — 94799 UNLISTED PULMONARY SVC/PX: CPT

## 2022-01-01 PROCEDURE — 25010000002 AMIODARONE PER 30 MG: Performed by: EMERGENCY MEDICINE

## 2022-01-01 RX ORDER — CALCIUM CHLORIDE 100 MG/ML
INJECTION INTRAVENOUS; INTRAVENTRICULAR
Status: COMPLETED | OUTPATIENT
Start: 2022-01-01 | End: 2022-01-01

## 2022-01-01 RX ORDER — ONDANSETRON 2 MG/ML
4 INJECTION INTRAMUSCULAR; INTRAVENOUS EVERY 6 HOURS PRN
Status: DISCONTINUED | OUTPATIENT
Start: 2022-01-01 | End: 2022-01-01 | Stop reason: HOSPADM

## 2022-01-01 RX ORDER — GLYCOPYRROLATE 0.2 MG/ML
0.2 INJECTION INTRAMUSCULAR; INTRAVENOUS EVERY 6 HOURS PRN
Status: DISCONTINUED | OUTPATIENT
Start: 2022-01-01 | End: 2022-01-01 | Stop reason: HOSPADM

## 2022-01-01 RX ORDER — ASPIRIN 81 MG/1
324 TABLET, CHEWABLE ORAL ONCE
Status: DISCONTINUED | OUTPATIENT
Start: 2022-01-01 | End: 2022-01-01

## 2022-01-01 RX ORDER — KETOROLAC TROMETHAMINE 15 MG/ML
15 INJECTION, SOLUTION INTRAMUSCULAR; INTRAVENOUS EVERY 6 HOURS PRN
Status: DISCONTINUED | OUTPATIENT
Start: 2022-01-01 | End: 2022-01-01 | Stop reason: HOSPADM

## 2022-01-01 RX ORDER — BISACODYL 10 MG
10 SUPPOSITORY, RECTAL RECTAL DAILY PRN
Status: DISCONTINUED | OUTPATIENT
Start: 2022-01-01 | End: 2022-01-01 | Stop reason: HOSPADM

## 2022-01-01 RX ORDER — HYDROMORPHONE HYDROCHLORIDE 1 MG/ML
0.5 INJECTION, SOLUTION INTRAMUSCULAR; INTRAVENOUS; SUBCUTANEOUS EVERY 4 HOURS
Status: DISCONTINUED | OUTPATIENT
Start: 2022-01-01 | End: 2022-01-01

## 2022-01-01 RX ORDER — BISACODYL 10 MG
10 SUPPOSITORY, RECTAL RECTAL NIGHTLY
Status: DISCONTINUED | OUTPATIENT
Start: 2022-01-01 | End: 2022-01-01 | Stop reason: HOSPADM

## 2022-01-01 RX ORDER — ACETAMINOPHEN 650 MG/1
650 SUPPOSITORY RECTAL EVERY 4 HOURS PRN
Status: DISCONTINUED | OUTPATIENT
Start: 2022-01-01 | End: 2022-01-01 | Stop reason: HOSPADM

## 2022-01-01 RX ORDER — NALOXONE HCL 0.4 MG/ML
0.1 VIAL (ML) INJECTION
Status: DISCONTINUED | OUTPATIENT
Start: 2022-01-01 | End: 2022-01-01

## 2022-01-01 RX ORDER — POLYVINYL ALCOHOL 14 MG/ML
2 SOLUTION/ DROPS OPHTHALMIC 2 TIMES DAILY
Status: DISCONTINUED | OUTPATIENT
Start: 2022-01-01 | End: 2022-01-01

## 2022-01-01 RX ORDER — SCOLOPAMINE TRANSDERMAL SYSTEM 1 MG/1
1 PATCH, EXTENDED RELEASE TRANSDERMAL
Status: DISCONTINUED | OUTPATIENT
Start: 2022-01-01 | End: 2022-01-01 | Stop reason: HOSPADM

## 2022-01-01 RX ORDER — MIDAZOLAM HYDROCHLORIDE 1 MG/ML
1 INJECTION INTRAMUSCULAR; INTRAVENOUS ONCE
Status: DISCONTINUED | OUTPATIENT
Start: 2022-01-01 | End: 2022-01-01

## 2022-01-01 RX ORDER — LORAZEPAM 2 MG/ML
0.5 INJECTION INTRAMUSCULAR EVERY 4 HOURS PRN
Status: DISCONTINUED | OUTPATIENT
Start: 2022-01-01 | End: 2022-01-01

## 2022-01-01 RX ORDER — POLYVINYL ALCOHOL 14 MG/ML
2 SOLUTION/ DROPS OPHTHALMIC 2 TIMES DAILY
Status: DISCONTINUED | OUTPATIENT
Start: 2022-01-01 | End: 2022-01-01 | Stop reason: HOSPADM

## 2022-01-01 RX ORDER — MIDAZOLAM HYDROCHLORIDE 1 MG/ML
1 INJECTION INTRAMUSCULAR; INTRAVENOUS EVERY 4 HOURS
Status: DISCONTINUED | OUTPATIENT
Start: 2022-01-01 | End: 2022-01-01

## 2022-01-01 RX ORDER — FUROSEMIDE 10 MG/ML
20 INJECTION INTRAMUSCULAR; INTRAVENOUS EVERY 8 HOURS
Status: DISCONTINUED | OUTPATIENT
Start: 2022-01-01 | End: 2022-01-01

## 2022-01-01 RX ORDER — FUROSEMIDE 10 MG/ML
40 INJECTION INTRAMUSCULAR; INTRAVENOUS EVERY 6 HOURS PRN
Status: DISCONTINUED | OUTPATIENT
Start: 2022-01-01 | End: 2022-01-01 | Stop reason: HOSPADM

## 2022-01-01 RX ORDER — EPINEPHRINE 0.1 MG/ML
SYRINGE (ML) INJECTION
Status: COMPLETED | OUTPATIENT
Start: 2022-01-01 | End: 2022-01-01

## 2022-01-01 RX ORDER — SODIUM CHLORIDE 0.9 % (FLUSH) 0.9 %
10 SYRINGE (ML) INJECTION AS NEEDED
Status: DISCONTINUED | OUTPATIENT
Start: 2022-01-01 | End: 2022-01-01 | Stop reason: HOSPADM

## 2022-01-01 RX ORDER — BISACODYL 10 MG
10 SUPPOSITORY, RECTAL RECTAL NIGHTLY
Status: DISCONTINUED | OUTPATIENT
Start: 2022-01-01 | End: 2022-01-01

## 2022-01-01 RX ORDER — HYDROMORPHONE HYDROCHLORIDE 1 MG/ML
0.25 INJECTION, SOLUTION INTRAMUSCULAR; INTRAVENOUS; SUBCUTANEOUS EVERY 6 HOURS
Status: DISCONTINUED | OUTPATIENT
Start: 2022-01-01 | End: 2022-01-01

## 2022-01-01 RX ORDER — MIDAZOLAM HYDROCHLORIDE 1 MG/ML
1 INJECTION INTRAMUSCULAR; INTRAVENOUS
Status: DISCONTINUED | OUTPATIENT
Start: 2022-01-01 | End: 2022-01-01 | Stop reason: HOSPADM

## 2022-01-01 RX ORDER — AMIODARONE HYDROCHLORIDE 50 MG/ML
INJECTION, SOLUTION INTRAVENOUS
Status: COMPLETED | OUTPATIENT
Start: 2022-01-01 | End: 2022-01-01

## 2022-01-01 RX ORDER — HALOPERIDOL 5 MG/ML
1 INJECTION INTRAMUSCULAR EVERY 4 HOURS PRN
Status: DISCONTINUED | OUTPATIENT
Start: 2022-01-01 | End: 2022-01-01 | Stop reason: HOSPADM

## 2022-01-01 RX ORDER — FUROSEMIDE 10 MG/ML
40 INJECTION INTRAMUSCULAR; INTRAVENOUS EVERY 8 HOURS
Status: DISCONTINUED | OUTPATIENT
Start: 2022-01-01 | End: 2022-01-01 | Stop reason: HOSPADM

## 2022-01-01 RX ORDER — FUROSEMIDE 10 MG/ML
20 INJECTION INTRAMUSCULAR; INTRAVENOUS EVERY 6 HOURS PRN
Status: DISCONTINUED | OUTPATIENT
Start: 2022-01-01 | End: 2022-01-01

## 2022-01-01 RX ORDER — HYDROMORPHONE HYDROCHLORIDE 1 MG/ML
0.5 INJECTION, SOLUTION INTRAMUSCULAR; INTRAVENOUS; SUBCUTANEOUS
Status: DISCONTINUED | OUTPATIENT
Start: 2022-01-01 | End: 2022-01-01

## 2022-01-01 RX ORDER — FUROSEMIDE 10 MG/ML
20 INJECTION INTRAMUSCULAR; INTRAVENOUS EVERY 6 HOURS
Status: DISCONTINUED | OUTPATIENT
Start: 2022-01-01 | End: 2022-01-01

## 2022-01-01 RX ADMIN — GLYCOPYRROLATE 0.2 MG: 0.2 INJECTION INTRAMUSCULAR; INTRAVENOUS at 21:23

## 2022-01-01 RX ADMIN — HYDROMORPHONE HYDROCHLORIDE: 10 INJECTION INTRAMUSCULAR; INTRAVENOUS; SUBCUTANEOUS at 18:05

## 2022-01-01 RX ADMIN — HYDROMORPHONE HYDROCHLORIDE 0.25 MG: 1 INJECTION, SOLUTION INTRAMUSCULAR; INTRAVENOUS; SUBCUTANEOUS at 08:35

## 2022-01-01 RX ADMIN — FUROSEMIDE 20 MG: 10 INJECTION INTRAMUSCULAR; INTRAVENOUS at 14:05

## 2022-01-01 RX ADMIN — SCOPALAMINE 1 PATCH: 1 PATCH, EXTENDED RELEASE TRANSDERMAL at 19:09

## 2022-01-01 RX ADMIN — MINERAL OIL: 1000 LIQUID ORAL at 18:05

## 2022-01-01 RX ADMIN — GLYCOPYRROLATE 0.2 MG: 0.2 INJECTION INTRAMUSCULAR; INTRAVENOUS at 05:20

## 2022-01-01 RX ADMIN — MINERAL OIL: 1000 LIQUID ORAL at 14:17

## 2022-01-01 RX ADMIN — FUROSEMIDE 20 MG: 10 INJECTION INTRAMUSCULAR; INTRAVENOUS at 02:10

## 2022-01-01 RX ADMIN — POLYVINYL ALCOHOL 2 DROP: 14 SOLUTION/ DROPS OPHTHALMIC at 21:31

## 2022-01-01 RX ADMIN — HYDROMORPHONE HYDROCHLORIDE 0.25 MG: 1 INJECTION, SOLUTION INTRAMUSCULAR; INTRAVENOUS; SUBCUTANEOUS at 02:10

## 2022-01-01 RX ADMIN — HYDROMORPHONE HYDROCHLORIDE 0.5 MG: 1 INJECTION, SOLUTION INTRAMUSCULAR; INTRAVENOUS; SUBCUTANEOUS at 09:51

## 2022-01-01 RX ADMIN — MINERAL OIL: 1000 LIQUID ORAL at 09:42

## 2022-01-01 RX ADMIN — MIDAZOLAM 1 MG: 1 INJECTION, SOLUTION INTRAMUSCULAR; INTRAVENOUS at 17:55

## 2022-01-01 RX ADMIN — HYDROMORPHONE HYDROCHLORIDE 0.5 MG: 1 INJECTION, SOLUTION INTRAMUSCULAR; INTRAVENOUS; SUBCUTANEOUS at 17:56

## 2022-01-01 RX ADMIN — EPINEPHRINE 1 MG: 0.1 INJECTION INTRACARDIAC; INTRAVENOUS at 14:11

## 2022-01-01 RX ADMIN — HYDROMORPHONE HYDROCHLORIDE 0.5 MG: 1 INJECTION, SOLUTION INTRAMUSCULAR; INTRAVENOUS; SUBCUTANEOUS at 11:36

## 2022-01-01 RX ADMIN — FUROSEMIDE 20 MG: 10 INJECTION INTRAMUSCULAR; INTRAVENOUS at 08:35

## 2022-01-01 RX ADMIN — EPINEPHRINE 1 MG: 0.1 INJECTION INTRACARDIAC; INTRAVENOUS at 14:07

## 2022-01-01 RX ADMIN — AMIODARONE HYDROCHLORIDE 300 MG: 50 INJECTION, SOLUTION INTRAVENOUS at 14:17

## 2022-01-01 RX ADMIN — GLYCOPYRROLATE 0.2 MG: 0.2 INJECTION INTRAMUSCULAR; INTRAVENOUS at 15:25

## 2022-01-01 RX ADMIN — HYDROMORPHONE HYDROCHLORIDE 0.5 MG: 1 INJECTION, SOLUTION INTRAMUSCULAR; INTRAVENOUS; SUBCUTANEOUS at 14:17

## 2022-01-01 RX ADMIN — HYDROMORPHONE HYDROCHLORIDE 0.25 MG: 1 INJECTION, SOLUTION INTRAMUSCULAR; INTRAVENOUS; SUBCUTANEOUS at 19:10

## 2022-01-01 RX ADMIN — MIDAZOLAM 1 MG: 1 INJECTION, SOLUTION INTRAMUSCULAR; INTRAVENOUS at 12:39

## 2022-01-01 RX ADMIN — MIDAZOLAM 1 MG: 1 INJECTION, SOLUTION INTRAMUSCULAR; INTRAVENOUS at 05:20

## 2022-01-01 RX ADMIN — MIDAZOLAM 1 MG: 1 INJECTION, SOLUTION INTRAMUSCULAR; INTRAVENOUS at 11:37

## 2022-01-01 RX ADMIN — HYDROMORPHONE HYDROCHLORIDE 0.5 MG: 1 INJECTION, SOLUTION INTRAMUSCULAR; INTRAVENOUS; SUBCUTANEOUS at 21:23

## 2022-01-01 RX ADMIN — SODIUM BICARBONATE 50 MEQ: 84 INJECTION, SOLUTION INTRAVENOUS at 14:09

## 2022-01-01 RX ADMIN — HYDROMORPHONE HYDROCHLORIDE 1 MG: 1 INJECTION, SOLUTION INTRAMUSCULAR; INTRAVENOUS; SUBCUTANEOUS at 16:35

## 2022-01-01 RX ADMIN — MIDAZOLAM 1 MG: 1 INJECTION, SOLUTION INTRAMUSCULAR; INTRAVENOUS at 08:35

## 2022-01-01 RX ADMIN — MINERAL OIL: 1000 LIQUID ORAL at 02:11

## 2022-01-01 RX ADMIN — FUROSEMIDE 40 MG: 10 INJECTION, SOLUTION INTRAMUSCULAR; INTRAVENOUS at 17:15

## 2022-01-01 RX ADMIN — MIDAZOLAM 1 MG: 1 INJECTION, SOLUTION INTRAMUSCULAR; INTRAVENOUS at 14:17

## 2022-01-01 RX ADMIN — HYDROMORPHONE HYDROCHLORIDE 1 MG: 1 INJECTION, SOLUTION INTRAMUSCULAR; INTRAVENOUS; SUBCUTANEOUS at 12:39

## 2022-01-01 RX ADMIN — CALCIUM CHLORIDE 1 G: 100 INJECTION INTRAVENOUS; INTRAVENTRICULAR at 14:08

## 2022-01-01 RX ADMIN — LIDOCAINE HYDROCHLORIDE 100 MG: 20 INJECTION INTRAVENOUS at 14:16

## 2022-01-01 RX ADMIN — MINERAL OIL: 1000 LIQUID ORAL at 12:43

## 2022-01-01 RX ADMIN — FUROSEMIDE 20 MG: 10 INJECTION INTRAMUSCULAR; INTRAVENOUS at 21:22

## 2022-01-01 RX ADMIN — MINERAL OIL: 1000 LIQUID ORAL at 21:31

## 2022-01-01 RX ADMIN — FUROSEMIDE 20 MG: 10 INJECTION INTRAMUSCULAR; INTRAVENOUS at 09:51

## 2022-01-01 RX ADMIN — POLYVINYL ALCOHOL 2 DROP: 14 SOLUTION/ DROPS OPHTHALMIC at 09:43

## 2022-01-01 RX ADMIN — EPINEPHRINE 1 MG: 0.1 INJECTION INTRACARDIAC; INTRAVENOUS at 14:14

## 2022-01-01 RX ADMIN — HYDROMORPHONE HYDROCHLORIDE 0.5 MG: 1 INJECTION, SOLUTION INTRAMUSCULAR; INTRAVENOUS; SUBCUTANEOUS at 05:20

## 2022-01-01 RX ADMIN — MIDAZOLAM 1 MG/HR: 5 INJECTION, SOLUTION INTRAMUSCULAR; INTRAVENOUS at 14:05

## 2022-06-21 PROBLEM — I25.9 ISCHEMIC HEART DISEASE: Status: ACTIVE | Noted: 2022-01-01

## (undated) DEVICE — 3M™ IOBAN™ 2 ANTIMICROBIAL INCISE DRAPE 6650EZ: Brand: IOBAN™ 2

## (undated) DEVICE — UNDERCAST PADDING: Brand: DEROYAL

## (undated) DEVICE — SPLNT PLSTR ORTHO 5IN

## (undated) DEVICE — PK EXTREM LOWR 10

## (undated) DEVICE — PAD CAST SOF ROL NS 6IN

## (undated) DEVICE — NON-OCCLUSIVE GAUZE STRIP OVERWRAP,3% BISMUTH TRIBROMOPHENATE IN OIL EMULSION: Brand: XEROFORM

## (undated) DEVICE — GOWN,REINF,POLY,ECL,PP SLV,XXL: Brand: MEDLINE

## (undated) DEVICE — WEBRIL* CAST PADDING: Brand: DEROYAL

## (undated) DEVICE — SNAP KOVER: Brand: UNBRANDED

## (undated) DEVICE — BIT DRL QC SS 2.5X110MM

## (undated) DEVICE — SUT ETHLN 3/0 PC5 18IN 1893G

## (undated) DEVICE — PETROLATUM DRESSING. FINE MESH GAUZE IMPREGNATED WITH 3% BISMUTH TRIBROMOPHENATE  IN A PETROLATUM BLEND.: Brand: XEROFORM PETROLATUM

## (undated) DEVICE — ENCORE® LATEX MICRO SIZE 8, STERILE LATEX POWDER-FREE SURGICAL GLOVE: Brand: ENCORE

## (undated) DEVICE — SPNG GZ WOVN 4X4IN 12PLY 10/BX STRL

## (undated) DEVICE — Device

## (undated) DEVICE — CVR HNDL LT SURG ACCSSRY BLU STRL

## (undated) DEVICE — APPL CHLORAPREP W/TINT 26ML BLU

## (undated) DEVICE — BNDG ELAS ELITE V/CLOSE 6IN 5YD LF STRL